# Patient Record
Sex: FEMALE | Race: BLACK OR AFRICAN AMERICAN | Employment: UNEMPLOYED | URBAN - METROPOLITAN AREA
[De-identification: names, ages, dates, MRNs, and addresses within clinical notes are randomized per-mention and may not be internally consistent; named-entity substitution may affect disease eponyms.]

---

## 2017-01-03 DIAGNOSIS — R31.9 HEMATURIA: Primary | ICD-10-CM

## 2017-01-04 ENCOUNTER — TELEPHONE (OUTPATIENT)
Dept: INTERNAL MEDICINE CLINIC | Age: 57
End: 2017-01-04

## 2017-01-04 NOTE — TELEPHONE ENCOUNTER
Attempted to contact pt at given and listed numbers, no answer. Lvm for pt to return call to office at [phone]. Will continue to try to contact pt.

## 2017-01-04 NOTE — LETTER
1/10/2017 1:50 PM 
 
Ms. Irving Smith [address] [facility] Dear Abdoulaye Bansal: 
 
I hope this letter finds you well. I am a Licensed Practical Nurse with On license of [facility] and I have attempted to contact you by phone, but was unsuccessful. Your good health is important to us. As always, our goal is to be your partner in life-long wellness. Please contact our office at your earliest convenience. If you have any questions, please do not hesitate to give us a call at the number listed above. Sincerely, Mario Ray LPN

## 2017-01-09 NOTE — TELEPHONE ENCOUNTER
Attempted to contact pt at  number, no answer. Not able to leave a VM because mailbox is full. Will continue to try to contact pt.

## 2017-01-10 NOTE — TELEPHONE ENCOUNTER
Attempted to contact pt at  number, no answer. m for pt to return call to office at [phone]. Will continue to try to contact pt. I have been unable to reach this patient by phone. A letter is being sent to the last known home address. Encounter will be closed.

## 2017-01-16 ENCOUNTER — HOSPITAL ENCOUNTER (OUTPATIENT)
Dept: LAB | Age: 57
Discharge: HOME | End: 2017-01-16
Payer: MEDICARE

## 2017-01-16 ENCOUNTER — OFFICE VISIT (OUTPATIENT)
Dept: INTERNAL MEDICINE CLINIC | Age: 57
End: 2017-01-16

## 2017-01-16 VITALS
HEIGHT: 67 IN | HEART RATE: 99 BPM | SYSTOLIC BLOOD PRESSURE: 109 MMHG | OXYGEN SATURATION: 96 % | DIASTOLIC BLOOD PRESSURE: 73 MMHG | BODY MASS INDEX: 38.92 KG/M2 | RESPIRATION RATE: 15 BRPM | WEIGHT: 248 LBS | TEMPERATURE: 98 F

## 2017-01-16 DIAGNOSIS — E11.9 TYPE 2 DIABETES MELLITUS WITHOUT COMPLICATION, WITHOUT LONG-TERM CURRENT USE OF INSULIN (HCC): Chronic | ICD-10-CM

## 2017-01-16 DIAGNOSIS — Z13.39 SCREENING FOR ALCOHOLISM: ICD-10-CM

## 2017-01-16 DIAGNOSIS — R19.5 LOOSE STOOLS: ICD-10-CM

## 2017-01-16 DIAGNOSIS — N39.0 URINARY TRACT INFECTION WITH HEMATURIA, SITE UNSPECIFIED: ICD-10-CM

## 2017-01-16 DIAGNOSIS — Z00.00 ROUTINE GENERAL MEDICAL EXAMINATION AT A HEALTH CARE FACILITY: Primary | ICD-10-CM

## 2017-01-16 DIAGNOSIS — R31.9 URINARY TRACT INFECTION WITH HEMATURIA, SITE UNSPECIFIED: ICD-10-CM

## 2017-01-16 LAB
ALBUMIN SERPL BCP-MCNC: 4.1 G/DL (ref 3.4–5)
ALBUMIN/GLOB SERPL: 1.2 {RATIO} (ref 0.8–1.7)
ALP SERPL-CCNC: 51 U/L (ref 45–117)
ALT SERPL-CCNC: 34 U/L (ref 13–56)
ANION GAP BLD CALC-SCNC: 6 MMOL/L (ref 3–18)
APPEARANCE UR: ABNORMAL
AST SERPL W P-5'-P-CCNC: 22 U/L (ref 15–37)
BACTERIA URNS QL MICRO: ABNORMAL /HPF
BILIRUB SERPL-MCNC: 0.3 MG/DL (ref 0.2–1)
BILIRUB UR QL: ABNORMAL
BUN SERPL-MCNC: 10 MG/DL (ref 7–18)
BUN/CREAT SERPL: 10 (ref 12–20)
CALCIUM SERPL-MCNC: 9.1 MG/DL (ref 8.5–10.1)
CAOX CRY URNS QL MICRO: ABNORMAL
CHLORIDE SERPL-SCNC: 107 MMOL/L (ref 100–108)
CHOLEST SERPL-MCNC: 124 MG/DL
CO2 SERPL-SCNC: 27 MMOL/L (ref 21–32)
COLOR UR: ABNORMAL
CREAT SERPL-MCNC: 1.04 MG/DL (ref 0.6–1.3)
EPITH CASTS URNS QL MICRO: ABNORMAL /LPF (ref 0–5)
GLOBULIN SER CALC-MCNC: 3.5 G/DL (ref 2–4)
GLUCOSE SERPL-MCNC: 79 MG/DL (ref 74–99)
GLUCOSE UR STRIP.AUTO-MCNC: NEGATIVE MG/DL
HBA1C MFR BLD: 6.8 % (ref 4.2–5.6)
HDLC SERPL-MCNC: 51 MG/DL (ref 40–60)
HDLC SERPL: 2.4 {RATIO} (ref 0–5)
HGB UR QL STRIP: NEGATIVE
KETONES UR QL STRIP.AUTO: ABNORMAL MG/DL
LDLC SERPL CALC-MCNC: 45.4 MG/DL (ref 0–100)
LEUKOCYTE ESTERASE UR QL STRIP.AUTO: ABNORMAL
LIPID PROFILE,FLP: NORMAL
NITRITE UR QL STRIP.AUTO: POSITIVE
PH UR STRIP: 5.5 [PH] (ref 5–8)
POTASSIUM SERPL-SCNC: 3.8 MMOL/L (ref 3.5–5.5)
PROT SERPL-MCNC: 7.6 G/DL (ref 6.4–8.2)
PROT UR STRIP-MCNC: NEGATIVE MG/DL
RBC #/AREA URNS HPF: 0 /HPF (ref 0–5)
SODIUM SERPL-SCNC: 140 MMOL/L (ref 136–145)
SP GR UR REFRACTOMETRY: 1.03 (ref 1–1.03)
TRIGL SERPL-MCNC: 138 MG/DL (ref ?–150)
UROBILINOGEN UR QL STRIP.AUTO: 1 EU/DL (ref 0.2–1)
VLDLC SERPL CALC-MCNC: 27.6 MG/DL
WBC URNS QL MICRO: ABNORMAL /HPF (ref 0–4)

## 2017-01-16 PROCEDURE — 80053 COMPREHEN METABOLIC PANEL: CPT | Performed by: INTERNAL MEDICINE

## 2017-01-16 PROCEDURE — 82570 ASSAY OF URINE CREATININE: CPT | Performed by: INTERNAL MEDICINE

## 2017-01-16 PROCEDURE — 36415 COLL VENOUS BLD VENIPUNCTURE: CPT | Performed by: INTERNAL MEDICINE

## 2017-01-16 PROCEDURE — 80061 LIPID PANEL: CPT | Performed by: INTERNAL MEDICINE

## 2017-01-16 PROCEDURE — 81001 URINALYSIS AUTO W/SCOPE: CPT | Performed by: INTERNAL MEDICINE

## 2017-01-16 PROCEDURE — 83036 HEMOGLOBIN GLYCOSYLATED A1C: CPT | Performed by: INTERNAL MEDICINE

## 2017-01-16 RX ORDER — CALCIUM CARBONATE 600 MG
TABLET ORAL
COMMUNITY
End: 2017-07-13

## 2017-01-16 RX ORDER — ASPIRIN 81 MG/1
81 TABLET ORAL
COMMUNITY
End: 2017-01-16 | Stop reason: SDUPTHER

## 2017-01-16 RX ORDER — CARVEDILOL 6.25 MG/1
TABLET ORAL
Refills: 0 | COMMUNITY
Start: 2016-11-05 | End: 2017-01-16 | Stop reason: SDUPTHER

## 2017-01-16 RX ORDER — UBIDECARENONE 30 MG
100 CAPSULE ORAL
COMMUNITY
End: 2017-01-16 | Stop reason: SDUPTHER

## 2017-01-16 RX ORDER — SULFAMETHOXAZOLE AND TRIMETHOPRIM 800; 160 MG/1; MG/1
1 TABLET ORAL 2 TIMES DAILY
Qty: 14 TAB | Refills: 0 | Status: SHIPPED | OUTPATIENT
Start: 2017-01-16 | End: 2017-02-27

## 2017-01-16 NOTE — PATIENT INSTRUCTIONS
Schedule of Personalized Health Plan  (Provide Copy to Patient)  The best way to stay healthy is to live a healthy lifestyle. A healthy lifestyle includes regular exercise, eating a well-balanced diet, keeping a healthy weight and not smoking. Regular physical exams and screening tests are another important way to take care of yourself. Preventive exams provided by health care providers can find health problems early when treatment works best and can keep you from getting certain diseases or illnesses. Preventive services include exams, lab tests, screenings, shots, monitoring and information to help you take care of your own health. All people over 65 should have a pneumonia shot. Pneumonia shots are usually only needed once in a lifetime unless your doctor decides differently. All people over 65 should have a yearly flu shot. People over 65 are at medium to high risk for Hepatitis B. Three shots are needed for complete protection. In addition to your physical exam, some screening tests are recommended:    Bone mass measurement (dexa scan) is recommended every two years  Diabetes Mellitus screening is recommended every year. Glaucoma is an eye disease caused by high pressure in the eye. An eye exam is recommended every year. Cardiovascular screening tests that check your cholesterol and other blood fat (lipid) levels are recommended every five years. Colorectal Cancer screening tests help to find pre-cancerous polyps (growths in the colon) so they can be removed before they turn into cancer. Tests ordered for screening depend on your personal and family history risk factors.     Screening for Breast Cancer is recommended yearly with a mammogram.    Screening for Cervical Cancer is recommended every two years (annually for certain risk factors, such as previous history of STD or abnormal PAP in past 7 years), with a Pelvic Exam with PAP    Here is a list of your current Health Maintenance items with a due date:  Health Maintenance   Topic Date Due    Pneumococcal 19-64 Medium Risk (1 of 1 - PPSV23) 06/19/1980    FOOT EXAM Q1  12/22/2016    MICROALBUMIN Q1  12/22/2016    EYE EXAM RETINAL OR DILATED Q1  03/30/2017    HEMOGLOBIN A1C Q6M  06/02/2017    PAP AKA CERVICAL CYTOLOGY  09/25/2017    LIPID PANEL Q1  11/29/2017    BREAST CANCER SCRN MAMMOGRAM  12/07/2018    DTaP/Tdap/Td series (2 - Td) 11/01/2022    COLONOSCOPY  04/01/2024    Hepatitis C Screening  Completed    INFLUENZA AGE 9 TO ADULT  Completed

## 2017-01-16 NOTE — PROGRESS NOTES
HISTORY OF PRESENT ILLNESS  Judith Baldwin is a 55 y.o. female. Visit Vitals    /73    Pulse 99    Temp 98 °F (36.7 °C) (Oral)    Resp 15    Ht 5' 7\" (1.702 m)    Wt 248 lb (112.5 kg)    SpO2 96%    BMI 38.84 kg/m2       Diabetes   The history is provided by the patient. This is a chronic problem. The current episode started more than 1 week ago. The problem occurs daily. The problem has not changed since onset. Exacerbated by: diet. The symptoms are relieved by medications (diet). Treatments tried: see med list. The treatment provided moderate relief. Diarrhea    The current episode started more than 1 week ago. The problem occurs 2 to 4 times per day. The problem has not changed since onset. There has been no fever. The stool consistency is described as watery. Pertinent negatives include no vomiting (nausea), no chills and no anal bleeding. Risk factors: stress. Treatments tried: is taking amitiza? Review of Systems   Constitutional: Negative. Negative for chills. HENT: Negative. Respiratory: Negative. Cardiovascular: Negative. Gastrointestinal: Positive for diarrhea. Negative for anal bleeding and vomiting (nausea). Neurological: Negative. Physical Exam   Constitutional: She is oriented to person, place, and time. She appears well-developed and well-nourished. No distress. Cardiovascular: Normal rate and regular rhythm. Pulmonary/Chest: Effort normal and breath sounds normal.   Musculoskeletal: She exhibits no edema. Neurological: She is alert and oriented to person, place, and time.    Diabetic foot exam:     Left: Reflexes 2+     Vibratory sensation     Proprioception normal   Sharp/dull discrimination     Filament test normal sensation with micro filament   Pulse DP: 2+ (normal)   Pulse PT:    Deformities: Mild - 2nd and 3rd hammertoes    Right: Reflexes 2+   Vibratory sensation    Proprioception normal   Sharp/dull discrimination    Filament test normal sensation with micro filament   Pulse DP: 2+ (normal)   Pulse PT:    Deformities: Mild - 2nd and 3rd toe hammertoes       Skin: Skin is warm and dry. She is not diaphoretic. Psychiatric: She has a normal mood and affect. Nursing note and vitals reviewed. ASSESSMENT and PLAN    ICD-10-CM ICD-9-CM                   Type 2 diabetes mellitus without complication, without long-term current use of insulin ([facility]) O74.2 843.32 METABOLIC PANEL, COMPREHENSIVE      URINALYSIS W/ RFLX MICROSCOPIC      MICROALBUMIN, UR, RAND W/ MICROALBUMIN/CREA RATIO      LIPID PANEL      HM DIABETES FOOT EXAM      HEMOGLOBIN A1C W/O EAG    Loose stools L20.6 358.7 METABOLIC PANEL, COMPREHENSIVE    Urinary tract infection with hematuria, site unspecified N39.0 599.0 URINALYSIS W/ RFLX MICROSCOPIC    R31.9         Will tx the bladder UTI with sulfa. Hopefully the stools and the low back ache will improve. Can use some OTC imodium for now    Update diabetic lab    BP controlled    Discussed weight,  Diet, and lifestyle.     F/u 6 months for diabetes

## 2017-01-16 NOTE — PROGRESS NOTES
Jordi Rosas presents today for   Chief Complaint   Patient presents with    Annual Wellness Visit    Hypertension    Diabetes       Jordi Rosas preferred language for health care discussion is english/other. Is someone accompanying this pt? no    Is the patient using any DME equipment during OV? no    Depression Screening:  PHQ 2 / 9, over the last two weeks 12/12/2016 10/13/2016 5/9/2016 6/22/2015 7/3/2014   Little interest or pleasure in doing things Several days Not at all Several days Nearly every day Nearly every day   Feeling down, depressed or hopeless Several days Not at all Several days Nearly every day Several days   Total Score PHQ 2 2 0 2 6 4       Learning Assessment:  Learning Assessment 11/24/2014   PRIMARY LEARNER Patient   HIGHEST LEVEL OF EDUCATION - PRIMARY LEARNER  2 YEARS OF COLLEGE   BARRIERS PRIMARY LEARNER NONE   CO-LEARNER CAREGIVER No   PRIMARY LANGUAGE ENGLISH   LEARNER PREFERENCE PRIMARY DEMONSTRATION   ANSWERED BY self   RELATIONSHIP SELF       Abuse Screening:  No flowsheet data found. Fall Risk  No flowsheet data found. Health Maintenance reviewed and discussed per provider. Yes    Jordi Rosas is due for foot exam, microalbumin. Please order/place referral if appropriate. Advance Directive:  1. Do you have an advance directive in place? Patient Reply: no    2. If not, would you like material regarding how to put one in place? Patient Reply: yes    Coordination of Care:  1. Have you been to the ER, urgent care clinic since your last visit? Hospitalized since your last visit? no    2. Have you seen or consulted any other health care providers outside of the [address] [place] [address] since your last visit? Include any pap smears or colon screening.  no

## 2017-01-16 NOTE — PROGRESS NOTES
This is a Subsequent Medicare Annual Wellness Visit providing Personalized Prevention Plan Services (PPPS) (Performed 12 months after initial AWV and PPPS )    I have reviewed the patient's medical history in detail and updated the computerized patient record. History     Past Medical History   Diagnosis Date    Abnormal bone density screening 3/10/2011    Anemia 1996    Baker's cyst      left    CTS (carpal tunnel syndrome) 1999     bilat    Depression 2000    Diabetes mellitus     Fibromyalgia 2000    GERD (gastroesophageal reflux disease)     History of meniscal tear     Metacarpal bone fracture 2003     left 5th    Migraines     OAB (overactive bladder)      documented on UDS 2009    Obesity     Osteopenia march 2006    Plantar fasciitis 1996    Pre-syncope 7/19/16    Sleep apnea     Type II or unspecified type diabetes mellitus with neurological manifestations, uncontrolled     Unspecified hereditary and idiopathic peripheral neuropathy     Urge incontinence     Vitamin D deficiency       Past Surgical History   Procedure Laterality Date    Cardiac catheterization  04/2006     nml coronary arteries    Hx cholecystectomy  1999    Egd  8/08    Colonoscopy  8/08     5 yr f/u, Dr. Mala Perales Hx colonoscopy  4/14    Hx arthrotomy  1/15     left shoulder, with decompression. Dr. Alfredito Amaya Pr wrist arthroscop,release xvers lig  12/23/15     Carpal Tunnel Release    Pr wrist arthroscop,release xvers lig  1/12/16     Carpal Tunnel release     Current Outpatient Prescriptions   Medication Sig Dispense Refill    lubiPROStone (AMITIZA) 8 mcg capsule Take 2 Caps by mouth two (2) times daily (with meals). Indications: Chronic Idiopathic Constipation 120 Cap 5    HYDROcodone-acetaminophen (NORCO) 7.5-325 mg per tablet Take 1 Tab by mouth every six (6) hours as needed for Pain. Max Daily Amount: 4 Tabs. 30 Tab 0    DULoxetine (CYMBALTA) 60 mg capsule 120 mg daily.   0    hydrOXYzine (ATARAX) 50 mg tablet take 1 tablet by mouth every 12 hours  0    NYAMYC powder APPLY THIN LAYER UNDER BREASTS TWICE A DAY AS NEED FOR FLARE  1    enalapril (VASOTEC) 20 mg tablet Take 1 Tab by mouth daily. 30 Tab 5    alendronate (FOSAMAX) 70 mg tablet Take 1 Tab by mouth every seven (7) days. 4 Tab 11    tiZANidine (ZANAFLEX) 4 mg tablet Take 1 Tab by mouth three (3) times daily as needed. Indications: MUSCLE SPASM 30 Tab 1    ondansetron (ZOFRAN ODT) 8 mg disintegrating tablet Take 1 Tab by mouth every eight (8) hours as needed for Nausea. 15 Tab 1    SUMAtriptan succinate (IMITREX STATDOSE PEN) 6 mg/0.5 mL kit 6 mg by SubCUTAneous route once as needed for Migraine. 1 Kit 11    omeprazole (PRILOSEC) 20 mg capsule two (2) times a day.  0    ONETOUCH ULTRA TEST strip       multivitamin (ONE A DAY) tablet Take 1 Tab by mouth daily.  BD INSULIN PEN NEEDLE UF MINI 31 x 3/16 \" ndle   0    spironolactone (ALDACTONE) 25 mg tablet Take  by mouth daily.  CYANOCOBALAMIN, VITAMIN B-12, (B-12 DOTS PO) Take  by mouth daily.  coenzyme q10-vitamin e (COQ10 ) 100-100 mg-unit cap Take  by mouth two (2) times a day.  Liraglutide (VICTOZA) 0.6 mg/0.1 mL (18 mg/3 mL) sub-q pen 0.6 mg by SubCUTAneous route.  carvedilol (COREG) 3.125 mg tablet Take 6.25 mg by mouth two (2) times a day.  clonazepam (KLONOPIN) 0.5 mg tablet Take  by mouth two (2) times a day.  rosuvastatin (CRESTOR) 40 mg tablet Take 40 mg by mouth daily. NON FORMULARY       fenofibrate micronized (LOFIBRA) 134 mg capsule Take  by mouth every morning.  polyethylene glycol (MIRALAX) 17 gram/dose powder Take 17 g by mouth daily.  aspirin 81 mg chewable tablet Take 81 mg by mouth daily.  cholecalciferol, vitamin d3, (VITAMIN D) 1,000 unit tablet Take  by mouth daily.  CALCIUM CARBONATE/VITAMIN D3 (CALCIUM 600 + D,3, PO) Take 2 Caps by mouth daily.       calcium carbonate (CALTREX) 600 mg (1,500 mg) tablet Take by Mouth. Allergies   Allergen Reactions    Digoxin Nausea Only    Niaspan [Niacin] Rash    Wellbutrin [Bupropion Hcl] Itching     Family History   Problem Relation Age of Onset    Hypertension Mother     Heart Disease Mother     Kidney Disease Mother     Heart Disease Father      Social History   Substance Use Topics    Smoking status: Never Smoker    Smokeless tobacco: Never Used    Alcohol use No     Patient Active Problem List   Diagnosis Code    Migraine aura without headache G43. 109    Fatigue R53.83    Urge incontinence N39.41    OAB (overactive bladder) N32.81    DM2 (diabetes mellitus, type 2) (HCC) E11.9    Disorder of urinary tract N39.9    Constipation K59.00    Urinary incontinence R32    Dysfunctional voiding of urine N39.8    Erythema of tympanic membrane of both ears H73.23    Sinus pressure J34.89    Cough R05    Hematuria R31.9       Depression Risk Factor Screening:     PHQ 2 / 9, over the last two weeks 12/12/2016   Little interest or pleasure in doing things Several days   Feeling down, depressed or hopeless Several days   Total Score PHQ 2 2     Alcohol Risk Factor Screening: On any occasion during the past 3 months, have you had more than 3 drinks containing alcohol? No    Do you average more than 7 drinks per week? No      Functional Ability and Level of Safety:     Hearing Loss   none    Activities of Daily Living   Self-care. Requires assistance with: no ADLs    Fall Risk   No flowsheet data found. Abuse Screen   Patient is not abused    Review of Systems   A comprehensive review of systems was negative except for that written in the HPI. Some nausea and queasiness recently with some diarrhea. She has intermittent episodes of this and is not sure why it flared up this time.     Physical Examination     Evaluation of Cognitive Function:  Mood/affect:  neutral  Appearance: age appropriate  Family member/caregiver input: self    Visit Vitals  /73    Pulse 99    Temp 98 °F (36.7 °C) (Oral)    Resp 15    Ht 5' 7\" (1.702 m)    Wt 248 lb (112.5 kg)    SpO2 96%    BMI 38.84 kg/m2     General appearance: alert, cooperative, no distress, appears stated age  Ears: normal TM's and external ear canals AU  Throat: Lips, mucosa, and tongue normal. Teeth and gums normal  Lungs: clear to auscultation bilaterally  Breasts: normal appearance, no masses or tenderness  Abdomen: soft, non-tender. Bowel sounds normal. No masses,  no organomegaly  Extremities: extremities normal, atraumatic, no cyanosis or edema  3/3 objects remembered easily    Patient Care Team:  Nafisa Rose MD as PCP - General (Internal Medicine)  Cheyenne Gomes MD as Consulting Provider (Endocrinology)  Faraz Kulkarni MD as Consulting Provider (Obstetrics & Gynecology)  Leigha Ware MD as Consulting Provider (Orthopedic Surgery)  Theresa Oliver MD as Consulting Provider (Urology)  Tania Cervantes MD as Physician (Optometry)  Terrell Victor MD as Consulting Provider (Orthopedic Surgery)  Terry Pisano MD as Physician (Gastroenterology)    Advice/Referrals/Counseling   Education and counseling provided:  Are appropriate based on today's review and evaluation    Assessment/Plan       ICD-10-CM ICD-9-CM    1. Routine general medical examination at a health care facility Z00.00 V70.0    2. Screening for alcoholism Z13.89 V79.1    .

## 2017-01-16 NOTE — MR AVS SNAPSHOT
Visit Information Date & Time Provider Department Dept. Phone Encounter #  
 1/16/2017  1:45 PM Fabio Herring [facility] [phone] [id number] Follow-up Instructions Return in about 6 months (around 7/16/2017) for HTN, DM, cholesterol. Upcoming Health Maintenance Date Due Pneumococcal 19-64 Medium Risk (1 of 1 - PPSV23) 6/19/1980 FOOT EXAM Q1 12/22/2016 MICROALBUMIN Q1 12/22/2016 EYE EXAM RETINAL OR DILATED Q1 3/30/2017 HEMOGLOBIN A1C Q6M 6/2/2017 PAP AKA CERVICAL CYTOLOGY 9/25/2017 LIPID PANEL Q1 11/29/2017 BREAST CANCER SCRN MAMMOGRAM 12/7/2018 DTaP/Tdap/Td series (2 - Td) 11/1/2022 COLONOSCOPY 4/1/2024 Allergies as of 1/16/2017  Review Complete On: 1/16/2017 By: Fabio Herring MD  
  
 Severity Noted Reaction Type Reactions Digoxin  11/24/2014   Side Effect Nausea Only Niaspan [Niacin]  10/31/2011   Side Effect Rash Wellbutrin [Bupropion Hcl]  11/24/2014   Side Effect Itching Current Immunizations  Reviewed on 9/27/2013 Name Date Influenza Vaccine 9/27/2013  3:46 PM  
 Influenza Vaccine (Quad) PF 10/13/2016, 12/17/2015  2:37 PM  
 Influenza Vaccine PF 10/20/2014 Influenza Vaccine Split 11/1/2012  9:46 AM  
 Pneumococcal Conjugate (PCV-13) 12/12/2016 TDAP Vaccine 11/1/2012  9:45 AM  
  
 Not reviewed this visit You Were Diagnosed With   
  
 Codes Comments Routine general medical examination at a health care facility    -  Primary ICD-10-CM: Z00.00 ICD-9-CM: V70.0 Screening for alcoholism     ICD-10-CM: Z13.89 ICD-9-CM: V79.1 Type 2 diabetes mellitus without complication, without long-term current use of insulin (HCC)     ICD-10-CM: E11.9 ICD-9-CM: 250.00 Loose stools     ICD-10-CM: R19.5 ICD-9-CM: 787.7 Urinary tract infection with hematuria, site unspecified     ICD-10-CM: N39.0, R31.9 ICD-9-CM: 599.0 Vitals BP Pulse Temp Resp Height(growth percentile) Weight(growth percentile) 109/73 99 98 °F (36.7 °C) (Oral) 15 5' 7\" (1.702 m) 248 lb (112.5 kg) SpO2 BMI OB Status Smoking Status 96% 38.84 kg/m2 Postmenopausal Never Smoker BMI and BSA Data Body Mass Index Body Surface Area  
 38.84 kg/m 2 2.31 m 2 Preferred Pharmacy Pharmacy Name Phone [facility] [address] [address]  [facility] [phone] Your Updated Medication List  
  
   
This list is accurate as of: 1/16/17  3:06 PM.  Always use your most recent med list.  
  
  
  
  
 alendronate 70 mg tablet Commonly known as:  FOSAMAX Take 1 Tab by mouth every seven (7) days. aspirin 81 mg chewable tablet Take 81 mg by mouth daily. B-12 DOTS PO Take  by mouth daily. BD INSULIN PEN NEEDLE UF MINI 31 gauge x 3/16\" Ndle Generic drug:  Insulin Needles (Disposable) CALCIUM 600 + D(3) PO Take 2 Caps by mouth daily. calcium carbonate 600 mg (1,500 mg) tablet Commonly known as:  Leroy Olden Take by Mouth.  
  
 clonazePAM 0.5 mg tablet Commonly known as:  Inez Kava Take  by mouth two (2) times a day. COQ10  100-100 mg-unit Cap Generic drug:  coenzyme q10-vitamin e Take  by mouth two (2) times a day. COREG 3.125 mg tablet Generic drug:  carvedilol Take 6.25 mg by mouth two (2) times a day. CRESTOR 40 mg tablet Generic drug:  rosuvastatin Take 40 mg by mouth daily. NON FORMULARY DULoxetine 60 mg capsule Commonly known as:  CYMBALTA  
120 mg daily. enalapril 20 mg tablet Commonly known as:  Sacha Carmina Take 1 Tab by mouth daily. HYDROcodone-acetaminophen 7.5-325 mg per tablet Commonly known as:  Vero Scar Take 1 Tab by mouth every six (6) hours as needed for Pain. Max Daily Amount: 4 Tabs. hydrOXYzine HCl 50 mg tablet Commonly known as:  ATARAX  
take 1 tablet by mouth every 12 hours IMITREX STATDOSE PEN 6 mg/0.5 mL kit Generic drug:  SUMAtriptan succinate 6 mg by SubCUTAneous route once as needed for Migraine. LOFIBRA 134 mg capsule Generic drug:  fenofibrate micronized Take  by mouth every morning. lubiPROStone 8 mcg capsule Commonly known as:  Buck Melter Take 2 Caps by mouth two (2) times daily (with meals). Indications: Chronic Idiopathic Constipation MIRALAX 17 gram/dose powder Generic drug:  polyethylene glycol Take 17 g by mouth daily. multivitamin tablet Commonly known as:  ONE A DAY Take 1 Tab by mouth daily. NYAMYC powder Generic drug:  nystatin APPLY THIN LAYER UNDER BREASTS TWICE A DAY AS NEED FOR FLARE  
  
 omeprazole 20 mg capsule Commonly known as:  PRILOSEC  
two (2) times a day. ondansetron 8 mg disintegrating tablet Commonly known as:  ZOFRAN ODT Take 1 Tab by mouth every eight (8) hours as needed for Nausea. ONETOUCH ULTRA TEST strip Generic drug:  glucose blood VI test strips  
  
 spironolactone 25 mg tablet Commonly known as:  ALDACTONE Take  by mouth daily. tiZANidine 4 mg tablet Commonly known as:  Pat Garcia Take 1 Tab by mouth three (3) times daily as needed. Indications: MUSCLE SPASM  
  
 trimethoprim-sulfamethoxazole 160-800 mg per tablet Commonly known as:  BACTRIM DS, SEPTRA DS Take 1 Tab by mouth two (2) times a day. VICTOZA 0.6 mg/0.1 mL (18 mg/3 mL) sub-q pen Generic drug:  Liraglutide 0.6 mg by SubCUTAneous route. VITAMIN D3 1,000 unit tablet Generic drug:  cholecalciferol Take  by mouth daily. Prescriptions Sent to Pharmacy Refills  
 trimethoprim-sulfamethoxazole (BACTRIM DS, SEPTRA DS) 160-800 mg per tablet 0 Sig: Take 1 Tab by mouth two (2) times a day. Class: Normal  
 Pharmacy: [address]  [facility] Ph #: [phone] Route: Oral  
  
We Performed the Following  DIABETES FOOT EXAM [7 Custom] Follow-up Instructions Return in about 6 months (around 7/16/2017) for HTN, DM, cholesterol. To-Do List   
 01/16/2017 Lab:  HEMOGLOBIN A1C W/O EAG   
  
 01/16/2017 Lab:  LIPID PANEL   
  
 01/16/2017 Lab:  METABOLIC PANEL, COMPREHENSIVE   
  
 01/16/2017 Lab:  MICROALBUMIN, UR, RAND W/ MICROALBUMIN/CREA RATIO   
  
 01/16/2017 Lab:  URINALYSIS W/ RFLX MICROSCOPIC Patient Instructions Schedule of Personalized Health Plan (Provide Copy to Patient) The best way to stay healthy is to live a healthy lifestyle. A healthy lifestyle includes regular exercise, eating a well-balanced diet, keeping a healthy weight and not smoking. Regular physical exams and screening tests are another important way to take care of yourself. Preventive exams provided by health care providers can find health problems early when treatment works best and can keep you from getting certain diseases or illnesses. Preventive services include exams, lab tests, screenings, shots, monitoring and information to help you take care of your own health. All people over 65 should have a pneumonia shot. Pneumonia shots are usually only needed once in a lifetime unless your doctor decides differently. All people over 65 should have a yearly flu shot. People over 65 are at medium to high risk for Hepatitis B. Three shots are needed for complete protection. In addition to your physical exam, some screening tests are recommended: 
 
Bone mass measurement (dexa scan) is recommended every two years Diabetes Mellitus screening is recommended every year. Glaucoma is an eye disease caused by high pressure in the eye. An eye exam is recommended every year. Cardiovascular screening tests that check your cholesterol and other blood fat (lipid) levels are recommended every five years.   
 
Colorectal Cancer screening tests help to find pre-cancerous polyps (growths in the colon) so they can be removed before they turn into cancer. Tests ordered for screening depend on your personal and family history risk factors. Screening for Breast Cancer is recommended yearly with a mammogram. 
 
Screening for Cervical Cancer is recommended every two years (annually for certain risk factors, such as previous history of STD or abnormal PAP in past 7 years), with a Pelvic Exam with PAP Here is a list of your current Health Maintenance items with a due date: 
Health Maintenance Topic Date Due  Pneumococcal 19-64 Medium Risk (1 of 1 - PPSV23) 06/19/1980  
 FOOT EXAM Q1  12/22/2016  MICROALBUMIN Q1  12/22/2016  
 EYE EXAM RETINAL OR DILATED Q1  03/30/2017  
 HEMOGLOBIN A1C Q6M  06/02/2017  PAP AKA CERVICAL CYTOLOGY  09/25/2017  LIPID PANEL Q1  11/29/2017  BREAST CANCER SCRN MAMMOGRAM  12/07/2018  DTaP/Tdap/Td series (2 - Td) 11/01/2022  COLONOSCOPY  04/01/2024  Hepatitis C Screening  Completed  INFLUENZA AGE 9 TO ADULT  Completed Introducing hospitals & HEALTH SERVICES! Dear Wily Pike: Thank you for requesting a [employer] account. Our records indicate that you already have an active [employer] account. You can access your account anytime at [email or web address]. [email or web address]/[email or web address] Reports Did you know that you can access your hospital and ER discharge instructions at any time in [employer]? You can also review all of your test results from your hospital stay or ER visit. Additional Information If you have questions, please visit the Frequently Asked Questions section of the [employer] website at [email or web address] [email or web address]/[email or web address]/. Remember, [employer] is NOT to be used for urgent needs. For medical emergencies, dial 911. Now available from your iPhone and Android! Please provide this summary of care documentation to your next provider. Your primary care clinician is listed as [facility].  If you have any questions after today's visit, please call [phone].

## 2017-01-17 LAB
CREAT UR-MCNC: 415.29 MG/DL (ref 30–125)
MICROALBUMIN UR-MCNC: 1.9 MG/DL (ref 0–3)
MICROALBUMIN/CREAT UR-RTO: 5 MG/G (ref 0–30)

## 2017-01-31 ENCOUNTER — TELEPHONE (OUTPATIENT)
Dept: INTERNAL MEDICINE CLINIC | Age: 57
End: 2017-01-31

## 2017-02-01 NOTE — TELEPHONE ENCOUNTER
Pt contacted at home number. 2 pt identifiers confirmed. Pt was calling regarding letter that was sent for unsuccessful attempts to contact patient. Pt was seen in office on 01/16/2017 by PCP. Pt reports she is upset that she was not treated for UTI follow OV on 12/29/2016. Pt states that when she was seen by PCP on 01/16/2017 she was informed she should have been on antibiotics for UTI. Pt was informed of result note from PCP on 01/01/217 and pt was also informed that Kuldip Herman NP is not always in office and pt should follow up with PCP regarding lab work if NP is not available. Pt verbalized understanding. No other questions or concerns at this time.

## 2017-02-22 RX ORDER — CEFUROXIME AXETIL 250 MG/1
6 TABLET ORAL
Qty: 1 KIT | Refills: 11 | Status: SHIPPED | OUTPATIENT
Start: 2017-02-22 | End: 2019-01-20 | Stop reason: SDUPTHER

## 2017-02-22 NOTE — TELEPHONE ENCOUNTER
Requested Prescriptions     Pending Prescriptions Disp Refills    SUMAtriptan succinate (IMITREX STATDOSE PEN) 6 mg/0.5 mL kit 1 Kit 11     Si mg by SubCUTAneous route once as needed for Migraine.

## 2017-02-27 PROBLEM — N39.0 UTI (URINARY TRACT INFECTION): Status: ACTIVE | Noted: 2017-02-27

## 2017-03-16 ENCOUNTER — OFFICE VISIT (OUTPATIENT)
Dept: INTERNAL MEDICINE CLINIC | Age: 57
End: 2017-03-16

## 2017-03-16 VITALS
WEIGHT: 261 LBS | SYSTOLIC BLOOD PRESSURE: 130 MMHG | BODY MASS INDEX: 40.97 KG/M2 | TEMPERATURE: 97.6 F | HEART RATE: 97 BPM | HEIGHT: 67 IN | OXYGEN SATURATION: 100 % | DIASTOLIC BLOOD PRESSURE: 60 MMHG | RESPIRATION RATE: 16 BRPM

## 2017-03-16 DIAGNOSIS — M79.7 FIBROMYALGIA: Primary | ICD-10-CM

## 2017-03-16 DIAGNOSIS — E11.9 TYPE 2 DIABETES MELLITUS WITHOUT COMPLICATION, WITHOUT LONG-TERM CURRENT USE OF INSULIN (HCC): Chronic | ICD-10-CM

## 2017-03-16 DIAGNOSIS — K59.04 CHRONIC IDIOPATHIC CONSTIPATION: ICD-10-CM

## 2017-03-16 RX ORDER — GABAPENTIN 300 MG/1
300 CAPSULE ORAL 2 TIMES DAILY
Qty: 60 CAP | Refills: 1 | Status: SHIPPED | OUTPATIENT
Start: 2017-03-16 | End: 2017-07-13

## 2017-03-16 RX ORDER — DULAGLUTIDE 0.75 MG/.5ML
INJECTION, SOLUTION SUBCUTANEOUS
Refills: 1 | COMMUNITY
Start: 2017-03-06 | End: 2017-07-13

## 2017-03-16 NOTE — PROGRESS NOTES
HISTORY OF PRESENT ILLNESS  Michelle Valerio is a 55 y.o. female. Visit Vitals    /60    Pulse 97    Temp 97.6 °F (36.4 °C) (Oral)    Resp 16    Ht 5' 7\" (1.702 m)    Wt 261 lb (118.4 kg)    SpO2 100%    BMI 40.88 kg/m2       HPI Comments: Pt states \"I am just tired of hurting all of the time. \"  She can awaken in the middle of the night with pounding and fullness in her chest. Can last 10-15 minutes. She just \"tries to fall back to sleep. \" States she tries to deal with her pain by \"sleeping. \"  She has had a cardiac evaluation that was negative. She says her psychiatrist knows about this but told her she is on the best medicine    \"my whole body just aches. \" Moslty her joints. She gets very stiff while sitting and has a hard time getting up. \"I just hurt. \"    Generalized Body Aches   The history is provided by the patient (see comments). This is a chronic problem. Review of Systems   Constitutional: Negative. Respiratory: Negative. Cardiovascular: Negative. Musculoskeletal: Positive for back pain, joint pain and myalgias. Physical Exam   Constitutional: She is oriented to person, place, and time. She appears well-developed and well-nourished. No distress. Cardiovascular: Normal rate and regular rhythm. Pulmonary/Chest: Effort normal and breath sounds normal.   Musculoskeletal: She exhibits no edema. Nothing much on her joints. But c/o diffuse pain and has many trigger points. Neurological: She is alert and oriented to person, place, and time. Skin: Skin is warm and dry. She is not diaphoretic. Psychiatric: She has a normal mood and affect. Nursing note and vitals reviewed. ASSESSMENT and PLAN    ICD-10-CM ICD-9-CM    1. Fibromyalgia M79.7 729.1    2. Type 2 diabetes mellitus without complication, without long-term current use of insulin (HCC) E11.9 250.00    3. Chronic idiopathic constipation K59.04 564.00        Discussed trial of gabapentin.  Will try starting this  She is already on cymbalta    She reports the Amitiza does still help with her bowels. She still varies her other meds but her BMs are better. Discussed weight, and lifestyle.  She wants to work out better but hurts too much    F/u one month

## 2017-03-16 NOTE — PROGRESS NOTES
Chief Complaint   Patient presents with    Generalized Body Aches       Pt preferred language for health care discussion is english. Is someone accompanying this pt? no    Is the patient using any DME equipment during OV? no    Depression Screening completed. Active Dx    Learning Assessment completed. Yes    Abuse Screening completed. Yes    Health Maintenance reviewed and discussed per provider. Yes    Pt is due for Pneumo-13 or Peumo-23. Please order/place referral if appropriate. Advance Directive:  1. Do you have an advance directive in place? Patient Reply:no    2. If not, would you like material regarding how to put one in place? Patient Reply: No    Coordination of Care:  1. Have you been to the ER, urgent care clinic since your last visit? Hospitalized since your last visit? no    2. Have you seen or consulted any other health care providers outside of the [address] [place] [address] since your last visit? Include any pap smears or colon screening.  no

## 2017-03-16 NOTE — MR AVS SNAPSHOT
Visit Information Date & Time Provider Department Dept. Phone Encounter #  
 3/16/2017  9:00 AM Chris Genao [facility] [phone] [id number] Follow-up Instructions Return in about 1 month (around 4/16/2017) for check on med changes, fibromyalgia. Your Appointments 3/22/2017  1:00 PM  
ESTABLISHED PATIENT with Lolly Colvin MD  
[facility] ([address]) Appt Note: RTC in 2-3 weeks for f/u after botox [address] [phone]  
  
    
 7/18/2017 10:45 AM  
Office Visit with Chris Genao MD  
[facility] ([address]) Appt Note: 6 month fu  
 [address]  
  
   
 [address] [facility] Upcoming Health Maintenance Date Due Pneumococcal 19-64 Medium Risk (1 of 1 - PPSV23) 6/19/1980 HEMOGLOBIN A1C Q6M 7/16/2017 PAP AKA CERVICAL CYTOLOGY 9/25/2017 FOOT EXAM Q1 1/16/2018 MICROALBUMIN Q1 1/16/2018 LIPID PANEL Q1 1/16/2018 EYE EXAM RETINAL OR DILATED Q1 1/31/2018 BREAST CANCER SCRN MAMMOGRAM 12/7/2018 DTaP/Tdap/Td series (2 - Td) 11/1/2022 COLONOSCOPY 4/1/2024 Allergies as of 3/16/2017  Review Complete On: 3/16/2017 By: Chris Genao MD  
  
 Severity Noted Reaction Type Reactions Digoxin  11/24/2014   Side Effect Nausea Only Niaspan [Niacin]  10/31/2011   Side Effect Rash Wellbutrin [Bupropion Hcl]  11/24/2014   Side Effect Itching Current Immunizations  Reviewed on 9/27/2013 Name Date Influenza Vaccine 9/27/2013  3:46 PM  
 Influenza Vaccine (Quad) PF 10/13/2016, 12/17/2015  2:37 PM  
 Influenza Vaccine PF 10/20/2014 Influenza Vaccine Split 11/1/2012  9:46 AM  
 Pneumococcal Conjugate (PCV-13) 12/12/2016 TDAP Vaccine 11/1/2012  9:45 AM  
  
 Not reviewed this visit You Were Diagnosed With   
  
 Codes Comments Fibromyalgia    -  Primary ICD-10-CM: M79.7 ICD-9-CM: 729.1 Type 2 diabetes mellitus without complication, without long-term current use of insulin (HCC)     ICD-10-CM: E11.9 ICD-9-CM: 250.00 Chronic idiopathic constipation     ICD-10-CM: K59.04 
ICD-9-CM: 564.00 Vitals BP Pulse Temp Resp Height(growth percentile) Weight(growth percentile) 130/60 97 97.6 °F (36.4 °C) (Oral) 16 5' 7\" (1.702 m) 261 lb (118.4 kg) SpO2 BMI OB Status Smoking Status 100% 40.88 kg/m2 Postmenopausal Never Smoker BMI and BSA Data Body Mass Index Body Surface Area  
 40.88 kg/m 2 2.37 m 2 Preferred Pharmacy Pharmacy Name Phone [address]  [facility] [phone] Your Updated Medication List  
  
   
This list is accurate as of: 3/16/17  9:37 AM.  Always use your most recent med list.  
  
  
  
  
 alendronate 70 mg tablet Commonly known as:  FOSAMAX Take 1 Tab by mouth every seven (7) days. aspirin 81 mg chewable tablet Take 81 mg by mouth daily. BD INSULIN PEN NEEDLE UF MINI 31 gauge x 3/16\" Ndle Generic drug:  Insulin Needles (Disposable) CALCIUM 600 + D(3) PO Take 2 Caps by mouth daily. calcium carbonate 600 mg calcium (1,500 mg) tablet Commonly known as:  Megan Narinder Take by Mouth.  
  
 clonazePAM 0.5 mg tablet Commonly known as:  Cheko Brocks Take  by mouth two (2) times a day. COQ10  100-100 mg-unit Cap Generic drug:  coenzyme q10-vitamin e Take  by mouth two (2) times a day. COREG 3.125 mg tablet Generic drug:  carvedilol Take 6.25 mg by mouth two (2) times a day. CRESTOR 40 mg tablet Generic drug:  rosuvastatin Take 40 mg by mouth daily. NON FORMULARY DULoxetine 60 mg capsule Commonly known as:  CYMBALTA  
120 mg daily. enalapril 20 mg tablet Commonly known as:  Sherren Bookbinder Take 1 Tab by mouth daily. gabapentin 300 mg capsule Commonly known as:  NEURONTIN Take 1 Cap by mouth two (2) times a day.  
  
 hydrOXYzine HCl 50 mg tablet Commonly known as:  ATARAX  
take 1 tablet by mouth every 12 hours LOFIBRA 134 mg capsule Generic drug:  fenofibrate micronized Take  by mouth every morning. loratadine 10 mg Cap Take  by mouth.  
  
 lubiPROStone 8 mcg capsule Commonly known as:  Alexandru Force Take 2 Caps by mouth two (2) times daily (with meals). Indications: Chronic Idiopathic Constipation MIRALAX 17 gram/dose powder Generic drug:  polyethylene glycol Take 17 g by mouth daily. multivitamin tablet Commonly known as:  ONE A DAY Take 1 Tab by mouth daily. nitrofurantoin 100 mg capsule Commonly known as:  MACRODANTIN Take 1 Cap by mouth two (2) times a day. omeprazole 20 mg capsule Commonly known as:  PRILOSEC  
two (2) times a day. spironolactone 25 mg tablet Commonly known as:  ALDACTONE Take  by mouth daily. SUMAtriptan succinate 6 mg/0.5 mL kit Commonly known as:  IMITREX STATDOSE PEN  
6 mg by SubCUTAneous route once as needed for Migraine. TRULICITY 0.61 CX/6.6 mL sub-q pen Generic drug:  dulaglutide INJECT 0.75 WEEKLY ,IN PLACE OF VICTOZA =FORMULARY Prescriptions Sent to Pharmacy Refills  
 gabapentin (NEURONTIN) 300 mg capsule 1 Sig: Take 1 Cap by mouth two (2) times a day. Class: Normal  
 Pharmacy: [address] [facility] Ph #: [phone] Route: Oral  
  
Follow-up Instructions Return in about 1 month (around 4/16/2017) for check on med changes, fibromyalgia. Patient Instructions Fibromyalgia: Care Instructions Your Care Instructions Fibromyalgia is a painful condition that is not completely understood by medical experts. The cause of fibromyalgia is not known. It can make you feel tired and ache all over. It causes tender spots at specific points of the body that hurt only when you press on them. You may have trouble sleeping, as well as other symptoms. These problems can upset your work and home life. Symptoms tend to come and go, although they may never go away completely. Fibromyalgia does not harm your muscles, joints, or organs. Follow-up care is a key part of your treatment and safety. Be sure to make and go to all appointments, and call your doctor if you are having problems. It's also a good idea to know your test results and keep a list of the medicines you take. How can you care for yourself at home? · Exercise often. Walk, swim, or bike to help with pain and sleep problems and to make you feel better. · Try to get a good night's sleep. Go to bed and get up at the same time each day, whether you feel rested or not. Make sure you have a good mattress and pillow. · Reduce stress. Avoid things that cause you stress, if you can. If not, work at making them less stressful. Learn to use biofeedback, guided imagery, meditation, or other methods to relax. · Make healthy changes. Eat a balanced diet, quit smoking, and limit alcohol and caffeine. · Use a heating pad set on low or take warm baths or showers for pain. Using cold packs for up to 20 minutes at a time can also relieve pain. Put a thin cloth between the cold pack and your skin. A gentle massage might help too. · Be safe with medicines. Take your medicines exactly as prescribed. Call your doctor if you think you are having a problem with your medicine. Your doctor may talk to you about taking antidepressant medicines. These medicines may improve sleep, relieve pain, and in some cases treat depression. · Learn about fibromyalgia. This makes coping easier. Then, take an active role in your treatment. · Think about joining a support group with others who have fibromyalgia to learn more and get support. When should you call for help? Watch closely for changes in your health, and be sure to contact your doctor if: 
· You feel sad, helpless, or hopeless; lose interest in things you used to enjoy; or have other symptoms of depression. · Your fibromyalgia symptoms get worse. Where can you learn more? Go to http://rm-shannon.info/. Enter V003 in the search box to learn more about \"Fibromyalgia: Care Instructions. \" Current as of: April 18, 2016 Content Version: 11.1 © 1162-0960 [employer]. Care instructions adapted under license by [employer] (which disclaims liability or warranty for this information). If you have questions about a medical condition or this instruction, always ask your healthcare professional. [address] any warranty or liability for your use of this information. Introducing [facility] & HEALTH SERVICES! Dear Kiki Denver: Thank you for requesting a [employer] account. Our records indicate that you already have an active [employer] account. You can access your account anytime at [email or web address] Did you know that you can access your hospital and ER discharge instructions at any time in [employer]? You can also review all of your test results from your hospital stay or ER visit. Additional Information If you have questions, please visit the Frequently Asked Questions section of the [employer] website at [email or web address] Remember, [employer] is NOT to be used for urgent needs. For medical emergencies, dial 911. Now available from your iPhone and Android! Please provide this summary of care documentation to your next provider. Your primary care clinician is listed as [facility]. If you have any questions after today's visit, please call [phone].

## 2017-03-16 NOTE — PATIENT INSTRUCTIONS
Fibromyalgia: Care Instructions  Your Care Instructions  Fibromyalgia is a painful condition that is not completely understood by medical experts. The cause of fibromyalgia is not known. It can make you feel tired and ache all over. It causes tender spots at specific points of the body that hurt only when you press on them. You may have trouble sleeping, as well as other symptoms. These problems can upset your work and home life. Symptoms tend to come and go, although they may never go away completely. Fibromyalgia does not harm your muscles, joints, or organs. Follow-up care is a key part of your treatment and safety. Be sure to make and go to all appointments, and call your doctor if you are having problems. It's also a good idea to know your test results and keep a list of the medicines you take. How can you care for yourself at home? · Exercise often. Walk, swim, or bike to help with pain and sleep problems and to make you feel better. · Try to get a good night's sleep. Go to bed and get up at the same time each day, whether you feel rested or not. Make sure you have a good mattress and pillow. · Reduce stress. Avoid things that cause you stress, if you can. If not, work at making them less stressful. Learn to use biofeedback, guided imagery, meditation, or other methods to relax. · Make healthy changes. Eat a balanced diet, quit smoking, and limit alcohol and caffeine. · Use a heating pad set on low or take warm baths or showers for pain. Using cold packs for up to 20 minutes at a time can also relieve pain. Put a thin cloth between the cold pack and your skin. A gentle massage might help too. · Be safe with medicines. Take your medicines exactly as prescribed. Call your doctor if you think you are having a problem with your medicine. Your doctor may talk to you about taking antidepressant medicines. These medicines may improve sleep, relieve pain, and in some cases treat depression.   · Learn about fibromyalgia. This makes coping easier. Then, take an active role in your treatment. · Think about joining a support group with others who have fibromyalgia to learn more and get support. When should you call for help? Watch closely for changes in your health, and be sure to contact your doctor if:  · You feel sad, helpless, or hopeless; lose interest in things you used to enjoy; or have other symptoms of depression. · Your fibromyalgia symptoms get worse. Where can you learn more? Go to http://rm-shannon.info/. Enter V003 in the search box to learn more about \"Fibromyalgia: Care Instructions. \"  Current as of: April 18, 2016  Content Version: 11.1  © 8620-4187 [employer], [employer]. Care instructions adapted under license by [employer] (which disclaims liability or warranty for this information). If you have questions about a medical condition or this instruction, always ask your healthcare professional. [address] any warranty or liability for your use of this information.

## 2017-04-05 DIAGNOSIS — Z78.0 MENOPAUSE: ICD-10-CM

## 2017-05-25 DIAGNOSIS — Z76.0 MEDICATION REFILL: ICD-10-CM

## 2017-05-25 RX ORDER — ENALAPRIL MALEATE 20 MG/1
20 TABLET ORAL DAILY
Qty: 30 TAB | Refills: 5 | Status: SHIPPED | OUTPATIENT
Start: 2017-05-25 | End: 2017-11-26 | Stop reason: SDUPTHER

## 2017-05-25 NOTE — TELEPHONE ENCOUNTER
Requested Prescriptions     Pending Prescriptions Disp Refills    enalapril (VASOTEC) 20 mg tablet 30 Tab 5     Sig: Take 1 Tab by mouth daily.

## 2017-05-31 LAB — HBA1C MFR BLD HPLC: 6.3 %

## 2017-06-06 ENCOUNTER — DOCUMENTATION ONLY (OUTPATIENT)
Dept: INTERNAL MEDICINE CLINIC | Age: 57
End: 2017-06-06

## 2017-07-17 DIAGNOSIS — K59.04 CHRONIC IDIOPATHIC CONSTIPATION: ICD-10-CM

## 2017-07-17 RX ORDER — LUBIPROSTONE 8 UG/1
16 CAPSULE, GELATIN COATED ORAL 2 TIMES DAILY WITH MEALS
Qty: 120 CAP | Refills: 5 | Status: SHIPPED | OUTPATIENT
Start: 2017-07-17 | End: 2018-05-28 | Stop reason: SDUPTHER

## 2017-07-17 NOTE — TELEPHONE ENCOUNTER
Requested Prescriptions     Pending Prescriptions Disp Refills    lubiPROStone (AMITIZA) 8 mcg capsule 120 Cap 5     Sig: Take 2 Caps by mouth two (2) times daily (with meals).  Indications: chronic idiopathic constipation

## 2017-07-18 ENCOUNTER — OFFICE VISIT (OUTPATIENT)
Dept: INTERNAL MEDICINE CLINIC | Age: 57
End: 2017-07-18

## 2017-07-18 VITALS
WEIGHT: 260 LBS | BODY MASS INDEX: 40.81 KG/M2 | OXYGEN SATURATION: 97 % | TEMPERATURE: 98 F | RESPIRATION RATE: 18 BRPM | HEART RATE: 93 BPM | DIASTOLIC BLOOD PRESSURE: 61 MMHG | SYSTOLIC BLOOD PRESSURE: 103 MMHG | HEIGHT: 67 IN

## 2017-07-18 DIAGNOSIS — E11.9 TYPE 2 DIABETES MELLITUS WITHOUT COMPLICATION, WITHOUT LONG-TERM CURRENT USE OF INSULIN (HCC): Primary | Chronic | ICD-10-CM

## 2017-07-18 DIAGNOSIS — I10 ESSENTIAL HYPERTENSION: ICD-10-CM

## 2017-07-18 DIAGNOSIS — E78.5 DYSLIPIDEMIA: ICD-10-CM

## 2017-07-18 DIAGNOSIS — M17.0 PRIMARY OSTEOARTHRITIS OF BOTH KNEES: ICD-10-CM

## 2017-07-18 PROBLEM — N39.0 UTI (URINARY TRACT INFECTION): Status: RESOLVED | Noted: 2017-02-27 | Resolved: 2017-07-18

## 2017-07-18 NOTE — PROGRESS NOTES
HISTORY OF PRESENT ILLNESS  Irma Naidu is a 65 y.o. female. Visit Vitals    /61    Pulse 93    Temp 98 °F (36.7 °C) (Oral)    Resp 18    Ht 5' 7\" (1.702 m)    Wt 260 lb (117.9 kg)    SpO2 97%    BMI 40.72 kg/m2       HPI Comments: Checks her Blood sugar twice to 4 times a week    She brought in lab from Dr. Lennie Jung. Her HBA1c is down--6.1  Her LDL is good--51    Diabetes   The history is provided by the patient. This is a chronic problem. The current episode started more than 1 week ago. The problem occurs daily. The problem has not changed since onset. Pertinent negatives include no chest pain and no headaches. Exacerbated by: diet. The symptoms are relieved by medications (diet). Treatments tried: see med list. The treatment provided moderate relief. Cholesterol Problem   The history is provided by the patient. This is a chronic problem. The current episode started more than 1 week ago. The problem occurs daily. The problem has not changed since onset. Pertinent negatives include no chest pain and no headaches. Exacerbated by: diet. The symptoms are relieved by medications (diet). Treatments tried: see med lsit. The treatment provided mild relief. Hypertension    The history is provided by the patient. This is a chronic problem. The current episode started more than 1 week ago. The problem has not changed since onset. Pertinent negatives include no chest pain, no palpitations, no headaches and no dizziness. There are no associated agents to hypertension. Risk factors include postmenopause, stress, a sedentary lifestyle and obesity. Review of Systems   Constitutional: Negative for chills and fever. Cardiovascular: Negative for chest pain and palpitations. Genitourinary: Negative for frequency and urgency. Neurological: Negative for dizziness and headaches. Endo/Heme/Allergies: Negative for polydipsia.        Physical Exam   Constitutional: She is oriented to person, place, and time. She appears well-developed and well-nourished. No distress. Cardiovascular: Normal rate and regular rhythm. Pulmonary/Chest: Effort normal and breath sounds normal.   Musculoskeletal: She exhibits no edema. Neurological: She is alert and oriented to person, place, and time. Skin: Skin is warm and dry. She is not diaphoretic. Psychiatric: She has a normal mood and affect. Nursing note and vitals reviewed. ASSESSMENT and PLAN    ICD-10-CM ICD-9-CM    1. Type 2 diabetes mellitus without complication, without long-term current use of insulin (HCC) E11.9 250.00    2. Primary osteoarthritis of both knees M17.0 715.16    3. Dyslipidemia E78.5 272.4    4. Essential hypertension I10 401.9        Her main frustration is her knees. She has bone on bone and ortho will not do surgery until her BMI is under 40. She is seeing a weight doctor and will discuss whether there is a nutritionist available. reviewed lab which is excellent. Reviewed Dr. Yahaira Jarrell note. Dr. Lynnea Sicard is missing    BP looks great.   Chol is controlled    got her new sleep apnea machine and supplies    F/u January for [address]

## 2017-07-18 NOTE — PROGRESS NOTES
Chief Complaint   Patient presents with    Diabetes    Cholesterol Problem    Hypertension       Pt preferred language for health care discussion is [place]. Is someone accompanying this pt? no    Is the patient using any DME equipment during OV? no    Depression Screening:  PHQ over the last two weeks 7/18/2017 3/16/2017 12/12/2016 10/13/2016 8/30/2016 8/16/2016 5/9/2016   PHQ Not Done Active Diagnosis of Depression or Bipolar Disorder Active Diagnosis of Depression or Bipolar Disorder - - Active Diagnosis of Depression or Bipolar Disorder Active Diagnosis of Depression or Bipolar Disorder -   Little interest or pleasure in doing things Not at all - Several days Not at all - - Several days   Feeling down, depressed or hopeless Not at all - Several days Not at all - - Several days   Total Score PHQ 2 0 - 2 0 - - 2       Learning Assessment:  Learning Assessment 11/24/2014   PRIMARY LEARNER Patient   HIGHEST LEVEL OF EDUCATION - PRIMARY LEARNER  2 YEARS OF COLLEGE   BARRIERS PRIMARY LEARNER NONE   CO-LEARNER CAREGIVER No   PRIMARY LANGUAGE ENGLISH   LEARNER PREFERENCE PRIMARY DEMONSTRATION   ANSWERED BY self   RELATIONSHIP SELF       Abuse Screening: Too young    Health Maintenance reviewed and discussed per provider. Yes, all HM postponed by Provider      Advance Directive:  1. Do you have an advance directive in place? Patient Reply:no    2. If not, would you like material regarding how to put one in place? Patient Reply: no    Coordination of Care:  1. Have you been to the ER, urgent care clinic since your last visit? Hospitalized since your last visit? no    2. Have you seen or consulted any other health care providers outside of the [address] [place] [address] since your last visit? Include any pap smears or colon screening.  Dr. Ana Mcintyre

## 2017-07-18 NOTE — MR AVS SNAPSHOT
Visit Information Date & Time Provider Department Dept. Phone Encounter #  
 7/18/2017 10:45 AM Angie Gorman, [address] [id number] Follow-up Instructions Return in about 6 months (around 1/18/2018) for Medicare Wellness Visit, HTN, DM, cholesterol. Your Appointments 7/24/2017  1:30 PM  
PROCEDURE with Fang Alejandre MD  
[facility]. [address]) Appt Note: Return in about 4 months (around 7/22/2017) for F/U after botox.; Botox 100 Units [address] [address]  
[phone]  
  
   
 [place] [id number] Upcoming Health Maintenance Date Due  
 PAP AKA CERVICAL CYTOLOGY 9/25/2017 Pneumococcal 19-64 Medium Risk (1 of 1 - PPSV23) 1/14/2018* INFLUENZA AGE 9 TO ADULT 8/1/2017 HEMOGLOBIN A1C Q6M 11/30/2017 FOOT EXAM Q1 1/16/2018 MICROALBUMIN Q1 1/16/2018 LIPID PANEL Q1 1/16/2018 EYE EXAM RETINAL OR DILATED Q1 1/31/2018 BREAST CANCER SCRN MAMMOGRAM 12/7/2018 DTaP/Tdap/Td series (2 - Td) 11/1/2022 COLONOSCOPY 4/1/2024 *Topic was postponed. The date shown is not the original due date. Allergies as of 7/18/2017  Review Complete On: 7/18/2017 By: Angie Gorman MD  
  
 Severity Noted Reaction Type Reactions Digoxin  11/24/2014   Side Effect Nausea Only Niaspan [Niacin]  10/31/2011   Side Effect Rash Wellbutrin [Bupropion Hcl]  11/24/2014   Side Effect Itching Current Immunizations  Reviewed on 9/27/2013 Name Date Influenza Vaccine 9/27/2013  3:46 PM  
 Influenza Vaccine (Quad) PF 10/13/2016, 12/17/2015  2:37 PM  
 Influenza Vaccine PF 10/20/2014 Influenza Vaccine Split 11/1/2012  9:46 AM  
 Pneumococcal Conjugate (PCV-13) 12/12/2016 TDAP Vaccine 11/1/2012  9:45 AM  
  
 Not reviewed this visit You Were Diagnosed With   
  
 Codes Comments Type 2 diabetes mellitus without complication, without long-term current use of insulin (HCC)    -  Primary ICD-10-CM: E11.9 ICD-9-CM: 250.00 Primary osteoarthritis of both knees     ICD-10-CM: M17.0 ICD-9-CM: 715.16 Dyslipidemia     ICD-10-CM: E78.5 ICD-9-CM: 272.4 Essential hypertension     ICD-10-CM: I10 
ICD-9-CM: 401.9 Vitals BP Pulse Temp Resp Height(growth percentile) Weight(growth percentile) 103/61 93 98 °F (36.7 °C) (Oral) 18 5' 7\" (1.702 m) 260 lb (117.9 kg) SpO2 BMI OB Status Smoking Status 97% 40.72 kg/m2 Postmenopausal Never Smoker BMI and BSA Data Body Mass Index Body Surface Area 40.72 kg/m 2 2.36 m 2 Preferred Pharmacy Pharmacy Name Phone [facility] [address]  [facility] [phone] Your Updated Medication List  
  
   
This list is accurate as of: 7/18/17 11:19 AM.  Always use your most recent med list.  
  
  
  
  
 alendronate 70 mg tablet Commonly known as:  FOSAMAX Take 1 Tab by mouth every seven (7) days. aspirin 81 mg chewable tablet Take 81 mg by mouth daily. BD INSULIN PEN NEEDLE UF MINI 31 gauge x 3/16\" Ndle Generic drug:  Insulin Needles (Disposable) CALCIUM 600 + D(3) PO Take 2 Caps by mouth daily. carvedilol 6.25 mg tablet Commonly known as:  Luis E Mood Take  by mouth two (2) times daily (with meals). clonazePAM 0.5 mg tablet Commonly known as:  Clifton Gibes Take  by mouth two (2) times a day. COQ10  100-100 mg-unit Cap Generic drug:  coenzyme q10-vitamin e Take  by mouth two (2) times a day. CRESTOR 40 mg tablet Generic drug:  rosuvastatin Take 40 mg by mouth daily. NON FORMULARY DULoxetine 60 mg capsule Commonly known as:  CYMBALTA  
120 mg daily. enalapril 20 mg tablet Commonly known as:  Joe Kody Take 1 Tab by mouth daily.   
  
 estradiol 0.01 % (0.1 mg/gram) vaginal cream  
 Commonly known as:  ESTRACE Insert 2 g into vagina daily. Apply fingertip amount to outside of vaginal opening.  
  
 hydrOXYzine HCl 50 mg tablet Commonly known as:  ATARAX  
take 1 tablet by mouth every 12 hours LOFIBRA 134 mg capsule Generic drug:  fenofibrate micronized Take  by mouth every morning. loratadine 10 mg Cap Take  by mouth.  
  
 lubiPROStone 8 mcg capsule Commonly known as:  Marlinda Rajwinders Take 2 Caps by mouth two (2) times daily (with meals). Indications: chronic idiopathic constipation  
  
 metFORMIN 500 mg Tg24 24 hour tablet Commonly known asMarinus Bret ER Take  by mouth. 2 pills every evening MIRALAX 17 gram/dose powder Generic drug:  polyethylene glycol Take 17 g by mouth daily. multivitamin tablet Commonly known as:  ONE A DAY Take 1 Tab by mouth daily. nystatin powder Commonly known as:  MYCOSTATIN Apply  to affected area as needed. omeprazole 20 mg capsule Commonly known as:  PRILOSEC  
two (2) times a day. spironolactone 25 mg tablet Commonly known as:  ALDACTONE Take  by mouth daily. SUMAtriptan succinate 6 mg/0.5 mL kit Commonly known as:  IMITREX STATDOSE PEN  
6 mg by SubCUTAneous route once as needed for Migraine. TRULICITY 1.5 UA/7.8 mL sub-q pen Generic drug:  dulaglutide 1.5 mg by SubCUTAneous route every seven (7) days. Follow-up Instructions Return in about 6 months (around 1/18/2018) for Medicare Wellness Visit, HTN, DM, cholesterol. Introducing [facility]! Dear Mony Mckeon: Thank you for requesting a [employer] account. Our records indicate that you already have an active [employer] account. You can access your account anytime at [email or web address] Did you know that you can access your hospital and ER discharge instructions at any time in [employer]? You can also review all of your test results from your hospital stay or ER visit. Additional Information If you have questions, please visit the Frequently Asked Questions section of the [employer] website at [email or web address] Remember, [employer] is NOT to be used for urgent needs. For medical emergencies, dial 911. Now available from your iPhone and Android! Please provide this summary of care documentation to your next provider. Your primary care clinician is listed as [facility]. If you have any questions after today's visit, please call [phone].

## 2017-10-17 ENCOUNTER — CLINICAL SUPPORT (OUTPATIENT)
Dept: INTERNAL MEDICINE CLINIC | Age: 57
End: 2017-10-17

## 2017-10-17 DIAGNOSIS — Z23 ENCOUNTER FOR IMMUNIZATION: Primary | ICD-10-CM

## 2017-10-17 NOTE — PROGRESS NOTES
Presented for Influenza Vaccine. Administered in left deltoid without complaints. Pt observed for 5 min. No adverse reaction noted.  Pt left office in stable condition

## 2017-11-26 DIAGNOSIS — Z76.0 MEDICATION REFILL: ICD-10-CM

## 2017-11-26 RX ORDER — ENALAPRIL MALEATE 20 MG/1
TABLET ORAL
Qty: 30 TAB | Refills: 5 | Status: SHIPPED | OUTPATIENT
Start: 2017-11-26 | End: 2018-06-18 | Stop reason: SDUPTHER

## 2017-12-04 LAB
LDL-C, EXTERNAL: 37
MICROALBUMIN UR TEST STR-MCNC: NORMAL MG/DL

## 2017-12-14 ENCOUNTER — OFFICE VISIT (OUTPATIENT)
Dept: INTERNAL MEDICINE CLINIC | Age: 57
End: 2017-12-14

## 2017-12-14 VITALS
BODY MASS INDEX: 39.39 KG/M2 | DIASTOLIC BLOOD PRESSURE: 65 MMHG | SYSTOLIC BLOOD PRESSURE: 104 MMHG | TEMPERATURE: 97.8 F | HEIGHT: 67 IN | HEART RATE: 91 BPM | WEIGHT: 251 LBS | OXYGEN SATURATION: 98 % | RESPIRATION RATE: 18 BRPM

## 2017-12-14 DIAGNOSIS — M54.2 NECK PAIN: ICD-10-CM

## 2017-12-14 DIAGNOSIS — L30.9 ECZEMA, UNSPECIFIED TYPE: ICD-10-CM
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
## 2017-12-14 DIAGNOSIS — I10 ESSENTIAL HYPERTENSION: Chronic | ICD-10-CM

## 2017-12-14 DIAGNOSIS — R51.9 NEW ONSET HEADACHE: ICD-10-CM

## 2017-12-14 DIAGNOSIS — E11.9 TYPE 2 DIABETES MELLITUS WITHOUT COMPLICATION, WITHOUT LONG-TERM CURRENT USE OF INSULIN (HCC): Primary | Chronic | ICD-10-CM

## 2017-12-14 PROBLEM — E11.21 TYPE 2 DIABETES MELLITUS WITH NEPHROPATHY (HCC): Status: ACTIVE | Noted: 2017-12-14

## 2017-12-14 RX ORDER — TRIAMCINOLONE ACETONIDE 0.25 MG/G
CREAM TOPICAL 2 TIMES DAILY
Qty: 15 G | Refills: 5 | Status: SHIPPED | OUTPATIENT
Start: 2017-12-14 | End: 2018-08-01 | Stop reason: SDUPTHER

## 2017-12-14 RX ORDER — TIZANIDINE 4 MG/1
4 TABLET ORAL
Qty: 30 TAB | Refills: 1 | Status: SHIPPED | OUTPATIENT
Start: 2017-12-14 | End: 2018-08-01 | Stop reason: SDUPTHER

## 2017-12-14 NOTE — PROGRESS NOTES
ROOM # 5    Sherryn Holter presents today for   Chief Complaint   Patient presents with    Neck Pain     pt had a fall on Dec 4th    Back Pain    Mass     right side of neck       Sherryn Holter preferred language for health care discussion is english/other. Is someone accompanying this pt? no    Is the patient using any DME equipment during OV? no    Depression Screening:  PHQ over the last two weeks 12/14/2017 7/18/2017 3/16/2017 12/12/2016 10/13/2016 8/30/2016 8/16/2016   PHQ Not Done - Active Diagnosis of Depression or Bipolar Disorder Active Diagnosis of Depression or Bipolar Disorder - - Active Diagnosis of Depression or Bipolar Disorder Active Diagnosis of Depression or Bipolar Disorder   Little interest or pleasure in doing things Not at all Not at all - Several days Not at all - -   Feeling down, depressed or hopeless Not at all Not at all - Several days Not at all - -   Total Score PHQ 2 0 0 - 2 0 - -       Learning Assessment:  Learning Assessment 11/24/2014   PRIMARY LEARNER Patient   HIGHEST LEVEL OF EDUCATION - PRIMARY LEARNER  2 YEARS [facility] PRIMARY LEARNER NONE   CO-LEARNER CAREGIVER No   PRIMARY LANGUAGE ENGLISH   LEARNER PREFERENCE PRIMARY DEMONSTRATION   ANSWERED BY self   RELATIONSHIP SELF       Abuse Screening:  No flowsheet data found. Fall Risk  No flowsheet data found. Health Maintenance reviewed and discussed per provider. Yes    Sherryn Holter is due for pap. Please order/place referral if appropriate. Advance Directive:  1. Do you have an advance directive in place? Patient Reply: no    2. If not, would you like material regarding how to put one in place? Patient Reply: no    Coordination of Care:  1. Have you been to the ER, urgent care clinic since your last visit? Hospitalized since your last visit? no    2. Have you seen or consulted any other health care providers outside of the [address] [place] [address] since your last visit? Include any pap smears or colon screening.  no

## 2017-12-14 NOTE — MR AVS SNAPSHOT
Visit Information Date & Time Provider Department Dept. Phone Encounter #  
 12/14/2017  3:00 PM [address] [phone] [id number] Follow-up Instructions Return in about 2 weeks (around 12/28/2017) for recheck neck and headache. Your Appointments 1/9/2018 10:10 AM  
Nurse Visit with [facility] POD4 NURSE [facility]. Madi Grimm [address] ([facility]) Appt Note: C&S Botox scheduled for 1/22 with  [address] [facility] ([place], [place]) [phone]  
  
    
 1/22/2018  1:30 PM  
PROCEDURE with Justo Colvin MD  
[facility]. Madi Grimm 98 [facility]) Appt Note: Botox 200 Units 301 [address] [address]  
[phone]  
  
   
 [facility] [phone] Upcoming Health Maintenance Date Due  
 PAP AKA CERVICAL CYTOLOGY 9/25/2017 Pneumococcal 19-64 Medium Risk (1 of 1 - PPSV23) 1/14/2018* FOOT EXAM Q1 1/16/2018 MICROALBUMIN Q1 1/16/2018 EYE EXAM RETINAL OR DILATED Q1 1/31/2018 HEMOGLOBIN A1C Q6M 2/1/2018 LIPID PANEL Q1 5/17/2018 DTaP/Tdap/Td series (2 - Td) 11/1/2022 COLONOSCOPY 4/1/2024 *Topic was postponed. The date shown is not the original due date. Allergies as of 12/14/2017  Review Complete On: 12/14/2017 By: Nery Jules LPN Severity Noted Reaction Type Reactions Digoxin  11/24/2014   Side Effect Nausea Only Niaspan [Niacin]  10/31/2011   Side Effect Rash Wellbutrin [Bupropion Hcl]  11/24/2014   Side Effect Itching Current Immunizations  Reviewed on 9/27/2013 Name Date Influenza Vaccine 9/27/2013  3:46 PM  
 Influenza Vaccine (Quad) PF 10/17/2017, 10/13/2016, 12/17/2015  2:37 PM  
 Influenza Vaccine PF 10/20/2014 Influenza Vaccine Split 11/1/2012  9:46 AM  
 Pneumococcal Conjugate (PCV-13) 12/12/2016 TDAP Vaccine 11/1/2012  9:45 AM  
  
 Not reviewed this visit You Were Diagnosed With   
  
 Codes Comments Type 2 diabetes mellitus without complication, without long-term current use of insulin (HCC)    -  Primary ICD-10-CM: E11.9 ICD-9-CM: 250.00 Essential hypertension     ICD-10-CM: I10 
ICD-9-CM: 401.9 Eczema, unspecified type     ICD-10-CM: L30.9 ICD-9-CM: 692.9 Neck pain     ICD-10-CM: M54.2 ICD-9-CM: 723.1 New onset headache     ICD-10-CM: R51 ICD-9-CM: 784.0 Fall, initial encounter     ICD-10-CM: W19. Luis Antonio Britt ICD-9-CM: E888.9 Vitals BP Pulse Temp Resp Height(growth percentile) Weight(growth percentile) 104/65 (BP 1 Location: Left arm, BP Patient Position: Sitting) 91 97.8 °F (36.6 °C) (Oral) 18 5' 7\" (1.702 m) 251 lb (113.9 kg) SpO2 BMI OB Status Smoking Status 98% 39.31 kg/m2 Postmenopausal Never Smoker Vitals History BMI and BSA Data Body Mass Index Body Surface Area  
 39.31 kg/m 2 2.32 m 2 Preferred Pharmacy Pharmacy Name Phone [facility] [address]  [facility] [phone] Your Updated Medication List  
  
   
This list is accurate as of: 12/14/17  3:59 PM.  Always use your most recent med list.  
  
  
  
  
 alendronate 70 mg tablet Commonly known as:  FOSAMAX Take 1 Tab by mouth every seven (7) days. aspirin 81 mg chewable tablet Take 81 mg by mouth daily. BD INSULIN PEN NEEDLE UF MINI 31 gauge x 3/16\" Ndle Generic drug:  Insulin Needles (Disposable) CALCIUM 600 + D(3) PO Take 2 Caps by mouth daily. carvedilol 6.25 mg tablet Commonly known as:  Haskel Scarce Take  by mouth two (2) times daily (with meals). cephALEXin 500 mg capsule Commonly known as:  KEFLEX  
  
 clonazePAM 0.5 mg tablet Commonly known as:  Wilhelmena Listen Take  by mouth two (2) times a day. COQ10  100-100 mg-unit Cap Generic drug:  coenzyme q10-vitamin e  
 Take  by mouth two (2) times a day. CRESTOR 40 mg tablet Generic drug:  rosuvastatin Take 40 mg by mouth daily. NON FORMULARY DULoxetine 60 mg capsule Commonly known as:  CYMBALTA  
120 mg daily. enalapril 20 mg tablet Commonly known as:  VASOTEC  
take 1 tablet by mouth once daily  
  
 estradiol 0.01 % (0.1 mg/gram) vaginal cream  
Commonly known as:  ESTRACE Insert 2 g into vagina daily. Apply fingertip amount to outside of vaginal opening.  
  
 hydrOXYzine HCl 50 mg tablet Commonly known as:  ATARAX  
take 1 tablet by mouth every 12 hours LOFIBRA 134 mg capsule Generic drug:  fenofibrate micronized Take  by mouth every morning. loratadine 10 mg Cap Take  by mouth.  
  
 lubiPROStone 8 mcg capsule Commonly known as:  Ranell Susan Take 2 Caps by mouth two (2) times daily (with meals). Indications: chronic idiopathic constipation * metFORMIN 500 mg Tg24 24 hour tablet Commonly known asCristela Rumpf ER Take  by mouth. 2 pills every evening * metFORMIN  mg tablet Commonly known as:  GLUCOPHAGE XR  
  
 MIRALAX 17 gram/dose powder Generic drug:  polyethylene glycol Take 17 g by mouth daily. multivitamin tablet Commonly known as:  ONE A DAY Take 1 Tab by mouth daily. nystatin powder Commonly known as:  MYCOSTATIN Apply  to affected area as needed. omeprazole 20 mg capsule Commonly known as:  PRILOSEC  
two (2) times a day. spironolactone 25 mg tablet Commonly known as:  ALDACTONE Take  by mouth daily. SUMAtriptan succinate 6 mg/0.5 mL kit Commonly known as:  IMITREX STATDOSE PEN  
6 mg by SubCUTAneous route once as needed for Migraine. tiZANidine 4 mg tablet Commonly known as:  Sammie Crape Take 1 Tab by mouth two (2) times daily as needed. Indications: Muscle Spasm  
  
 triamcinolone acetonide 0.025 % topical cream  
Commonly known as:  KENALOG Apply  to affected area two (2) times a day. use thin layer * TRULICITY 1.5 XP/6.2 mL sub-q pen Generic drug:  dulaglutide 1.5 mg by SubCUTAneous route every seven (7) days. * TRULICITY 6.64 HZ/4.4 mL sub-q pen Generic drug:  dulaglutide * Notice: This list has 4 medication(s) that are the same as other medications prescribed for you. Read the directions carefully, and ask your doctor or other care provider to review them with you. Prescriptions Sent to Pharmacy Refills  
 triamcinolone acetonide (KENALOG) 0.025 % topical cream 5 Sig: Apply  to affected area two (2) times a day. use thin layer Class: Normal  
 Pharmacy: [address] Ph #: [phone] Route: Topical  
 tiZANidine (ZANAFLEX) 4 mg tablet 1 Sig: Take 1 Tab by mouth two (2) times daily as needed. Indications: Muscle Spasm Class: Normal  
 Pharmacy: [address] Ph #: [phone] Route: Oral  
  
Follow-up Instructions Return in about 2 weeks (around 12/28/2017) for recheck neck and headache. To-Do List   
 12/14/2017 Imaging:  CT HEAD WO CONT   
  
 12/14/2017 Imaging:  XR SPINE CERV FLEX/EXT MAX 3 V [facility]! Dear Devon Bucioters: Thank you for requesting a [employer] account. Our records indicate that you already have an active [employer] account. You can access your account anytime at [email or web address] Did you know that you can access your hospital and ER discharge instructions at any time in [employer]? You can also review all of your test results from your hospital stay or ER visit. Additional Information If you have questions, please visit the Frequently Asked Questions section of the [employer] website at [email or web address] Remember, [employer] is NOT to be used for urgent needs.  For medical emergencies, dial 911. Now available from your iPhone and Android! Please provide this summary of care documentation to your next provider. Your primary care clinician is listed as [facility]. If you have any questions after today's visit, please call [phone].

## 2017-12-14 NOTE — PROGRESS NOTES
HISTORY OF PRESENT ILLNESS  Christopher Delgado is a 65 y.o. female. Visit Vitals    /65 (BP 1 Location: Left arm, BP Patient Position: Sitting)    Pulse 91    Temp 97.8 °F (36.6 °C) (Oral)    Resp 18    Ht 5' 7\" (1.702 m)    Wt 251 lb (113.9 kg)    SpO2 98%    BMI 39.31 kg/m2       HPI Comments: Pt fell on Dec 2. She walking and found herself face down. She does not recall how she fell. She had a little finger and toe numbness at the time but did not think she was hurt . Bur the next day she was  \"Sore. \" Her back was hurting \"all over. \" She took some advil and ibuprofen and got better. But 3 days ago she awoke with a \"piercing\" headache that comes and goes and she had pain in the back of her neck as well. Some tingling in her right fingertips still. Her tailbone was sore right after the fall. This is better. He main concern is the headache and neck ache. She also reports ringing in her ears. Seems to be worse in the right ear. No change in her bowels or bladder and walk. Neck Pain   The history is provided by the patient (see comments). This is a new problem. The current episode started more than 1 week ago. The problem occurs daily. The problem has not changed since onset. Back Pain    The history is provided by the patient. This is a new problem. The current episode started more than 1 week ago. The problem has not changed since onset. The problem occurs daily. Mass         Review of Systems   Musculoskeletal: Positive for back pain and neck pain. Physical Exam   Constitutional: She is oriented to person, place, and time. She appears well-developed and well-nourished. No distress. HENT:   Right Ear: External ear normal.   Left Ear: External ear normal.   TMs OK   Eyes: Conjunctivae and EOM are normal. Pupils are equal, round, and reactive to light. Neck:   Small 4 mm comedone on lateral right neck. Some cheesy material expressed   Cardiovascular: Normal rate and regular rhythm. Pulmonary/Chest: Effort normal and breath sounds normal.   Musculoskeletal: She exhibits no edema. Neurological: She is alert and oriented to person, place, and time. She displays normal reflexes. No cranial nerve deficit. She exhibits normal muscle tone. Coordination normal.   Good upper reflexes. Skin: Skin is warm and dry. She is not diaphoretic. Psychiatric: She has a normal mood and affect. Nursing note and vitals reviewed. ASSESSMENT and PLAN    ICD-10-CM ICD-9-CM    1. Type 2 diabetes mellitus without complication, without long-term current use of insulin (HCC) E11.9 250.00    2. Essential hypertension I10 401.9    3. Eczema, unspecified type L30.9 692.9 triamcinolone acetonide (KENALOG) 0.025 % topical cream   4. Neck pain M54.2 723.1 XR SPINE CERV FLEX/EXT MAX 3 V   5. New onset headache R51 784.0 CT HEAD WO CONT   6. Fall, initial encounter [address] Crissie Pel C545.1 CT HEAD WO CONT      reviewed report form [facility] [place]    Discussed BMI/weight, lifestyle, diet and exercise. Pt had an appt in July that showed her at 280#--she has never weighed that much. A few days after weighing only 260. I believe that weight was wrong. She is seeing a weigh management person now and has lost a little weight. Will xray c-spine and sent for head CT.   I suspect her headache is due to neck injury    F/u 2 weeks for recheck of neck and headache  Otherwise f/u 4 months

## 2017-12-19 ENCOUNTER — HOSPITAL ENCOUNTER (OUTPATIENT)
Dept: CT IMAGING | Age: 57
Discharge: HOME | End: 2017-12-19
Attending: INTERNAL MEDICINE
Payer: MEDICARE

## 2017-12-19 DIAGNOSIS — R51.9 NEW ONSET HEADACHE: ICD-10-CM
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
## 2017-12-19 PROCEDURE — 70450 CT HEAD/BRAIN W/O DYE: CPT

## 2017-12-28 ENCOUNTER — OFFICE VISIT (OUTPATIENT)
Dept: INTERNAL MEDICINE CLINIC | Age: 57
End: 2017-12-28

## 2017-12-28 VITALS
HEIGHT: 67 IN | WEIGHT: 247 LBS | HEART RATE: 96 BPM | BODY MASS INDEX: 38.77 KG/M2 | OXYGEN SATURATION: 98 % | DIASTOLIC BLOOD PRESSURE: 68 MMHG | RESPIRATION RATE: 16 BRPM | SYSTOLIC BLOOD PRESSURE: 92 MMHG | TEMPERATURE: 98 F

## 2017-12-28 DIAGNOSIS — E11.21 TYPE 2 DIABETES MELLITUS WITH NEPHROPATHY (HCC): ICD-10-CM

## 2017-12-28 DIAGNOSIS — M62.838 MUSCLE SPASM: ICD-10-CM

## 2017-12-28 DIAGNOSIS — E11.9 TYPE 2 DIABETES MELLITUS WITHOUT COMPLICATION, WITHOUT LONG-TERM CURRENT USE OF INSULIN (HCC): Chronic | ICD-10-CM

## 2017-12-28 DIAGNOSIS — Z76.0 MEDICATION REFILL: ICD-10-CM

## 2017-12-28 DIAGNOSIS — M54.2 NECK PAIN: Primary | ICD-10-CM

## 2017-12-28 DIAGNOSIS — R09.81 NASAL CONGESTION: ICD-10-CM

## 2017-12-28 RX ORDER — HYDROCODONE POLISTIREX AND CHLORPHENIRAMINE POLISTIREX 10; 8 MG/5ML; MG/5ML
1 SUSPENSION, EXTENDED RELEASE ORAL
Qty: 115 ML | Status: SHIPPED | OUTPATIENT
Start: 2017-12-28 | End: 2018-01-22 | Stop reason: ALTCHOICE

## 2017-12-28 NOTE — PROGRESS NOTES
Chief Complaint   Patient presents with    Neck Pain    Headache       Pt preferred language for health care discussion is english. Is someone accompanying this pt? no    Is the patient using any DME equipment during OV? no    Depression Screening:  PHQ over the last two weeks 12/14/2017 7/18/2017 3/16/2017 12/12/2016 10/13/2016 8/30/2016 8/16/2016   PHQ Not Done - Active Diagnosis of Depression or Bipolar Disorder Active Diagnosis of Depression or Bipolar Disorder - - Active Diagnosis of Depression or Bipolar Disorder Active Diagnosis of Depression or Bipolar Disorder   Little interest or pleasure in doing things Not at all Not at all - Several days Not at all - -   Feeling down, depressed or hopeless Not at all Not at all - Several days Not at all - -   Total Score PHQ 2 0 0 - 2 0 - -       Learning Assessment:  Learning Assessment 11/24/2014   PRIMARY LEARNER Patient   HIGHEST LEVEL OF EDUCATION - PRIMARY LEARNER  2 YEARS [facility] PRIMARY LEARNER NONE   CO-LEARNER CAREGIVER No   PRIMARY LANGUAGE ENGLISH   LEARNER PREFERENCE PRIMARY DEMONSTRATION   ANSWERED BY self   RELATIONSHIP SELF       Abuse Screening:  No flowsheet data found. Health Maintenance reviewed and discussed per provider. Yes    Pt is due for pap smear, foot exam, microalbumin. Please order/place referral if appropriate. Advance Directive:  1. Do you have an advance directive in place? Patient Reply:no    2. If not, would you like material regarding how to put one in place? Patient Reply: no      Coordination of Care:  1. Have you been to the ER, urgent care clinic since your last visit? Hospitalized since your last visit? no    2. Have you seen or consulted any other health care providers outside of the [address] [place] [address] since your last visit? Include any pap smears or colon screening. Yes cardiology    Please see Red banners under Allergies, Med rec, Immunizations to remove outside inquires.  All correct information has been verified with patient and added to chart. Medication variance in dosage/sig per patient as follows: Trulicity 1.5 mg to be removed, there are two Metformins that one needs to be removed.   Her coreg she is taking 6.25 mg two in the am two in the PM until gone then she is to  the 12.5 mg tablets

## 2017-12-28 NOTE — ASSESSMENT & PLAN NOTE
This condition is managed by Specialist.  Key Antihyperglycemic Medications             TRULICITY 2.58 GZ/7.6 mL sub-q pen  (Taking)     metFORMIN ER (GLUCOPHAGE XR) 500 mg tablet     metFORMIN (GLUMETZA ER) 500 mg TG24 24 hour tablet Take  by mouth. 2 pills every evening    dulaglutide (TRULICITY) 1.5 RL/9.3 mL sub-q pen 1.5 mg by SubCUTAneous route every seven (7) days. Other Key Diabetic Medications             enalapril (VASOTEC) 20 mg tablet  (Taking) take 1 tablet by mouth once daily    clonazepam (KLONOPIN) 0.5 mg tablet  (Taking) Take  by mouth two (2) times a day. rosuvastatin (CRESTOR) 40 mg tablet  (Taking) Take 40 mg by mouth daily. NON FORMULARY     fenofibrate micronized (LOFIBRA) 134 mg capsule  (Taking) Take  by mouth every morning. Lab Results   Component Value Date/Time    Hemoglobin A1c 6.5 08/01/2017 04:07 PM    Hemoglobin A1c, External 6.3 05/31/2017    Glucose 76 08/01/2017 04:07 PM    Creatinine 1.2 08/01/2017 04:07 PM    Microalbumin/Creat ratio (mg/g creat) 5 01/16/2017 03:24 PM    Microalbumin,urine random 1.90 01/16/2017 03:24 PM    Urine Microalbumin, External normal (<12 nml is < 17). [facility] Herrera at [facility] 12/04/2017    Cholesterol, total 124 01/16/2017 03:24 PM    HDL Cholesterol 51 01/16/2017 03:24 PM    LDL, calculated 45.4 01/16/2017 03:24 PM    LDL-C, External 37 12/04/2017    Triglyceride 138 01/16/2017 03:24 PM     Diabetic Foot and Eye Exam HM Status   Topic Date Due    Diabetic Foot Care  01/16/2018    Eye Exam  01/31/2018

## 2017-12-28 NOTE — PROGRESS NOTES
HISTORY OF PRESENT ILLNESS  Rodriguez Leija is a 65 y.o. female. Visit Vitals    BP 92/68 (BP 1 Location: Left arm, BP Patient Position: Sitting)    Pulse 96    Temp 98 °F (36.7 °C) (Oral)    Resp 16    Ht 5' 7\" (1.702 m)    Wt 247 lb (112 kg)    SpO2 98%    BMI 38.69 kg/m2       HPI Comments: neck and headache sxs have essentially resolved. Has some muslce relaxers and time seems to have done the job. Neck Pain   The history is provided by the patient (see comments). This is a chronic problem. The current episode started more than 1 week ago. The problem occurs rarely. The problem has been resolved. Associated symptoms include headaches. Headache   The history is provided by the patient (see comments). This is a chronic problem. The current episode started more than 1 week ago. The problem occurs every several days. The problem has been resolved. Associated symptoms include headaches. Exacerbated by: activity. Review of Systems   Musculoskeletal: Positive for neck pain. Neurological: Positive for headaches. Physical Exam   Constitutional: She is oriented to person, place, and time. She appears well-developed and well-nourished. No distress. Cardiovascular: Normal rate and regular rhythm. Pulmonary/Chest: Effort normal and breath sounds normal.   Musculoskeletal: She exhibits no edema. Good upper body strength. Good ROM of neck. Some tightness along trapezius. But overall negative findings   Neurological: She is alert and oriented to person, place, and time.    Diabetic foot exam:     Left: Reflexes 2+     Vibratory sensation diminished    Proprioception normal   Sharp/dull discrimination     Filament test normal sensation with micro filament   Pulse DP: 2+ (normal)   Pulse PT:    Deformities: Mild - mild toe deformities, flat arch    Right: Reflexes 2+   Vibratory sensation diminished   Proprioception normal   Sharp/dull discrimination    Filament test normal sensation with micro filament   Pulse DP: 2+ (normal)   Pulse PT:    Deformities: Mild - toe deformities, flat arch     Skin: Skin is warm and dry. She is not diaphoretic. Psychiatric: She has a normal mood and affect. Nursing note and vitals reviewed. ASSESSMENT and PLAN    ICD-10-CM ICD-9-CM    1. Neck pain M54.2 723.1    2. Muscle spasm M62.838 728.85    3. Type 2 diabetes mellitus without complication, without long-term current use of insulin ([facility]) E11.9 250.00  DIABETES FOOT EXAM   4. Type 2 diabetes mellitus with nephropathy ([facility]) E11.21 250.40      583.81    5. Nasal congestion R09.81 478.19 chlorpheniramine-HYDROcodone (TUSSIONEX) 10-8 mg/5 mL suspension   6. Medication refill Z76.0 V68.1 chlorpheniramine-HYDROcodone (TUSSIONEX) 10-8 mg/5 mL suspension       xrays of neck were great  CT brain did not find anything of concern either.    Both were essentiallly normal    F/u here 3-4 months

## 2017-12-28 NOTE — ASSESSMENT & PLAN NOTE
This condition is managed by Specialist.  Key Antihyperglycemic Medications             TRULICITY 2.04 MQ/4.0 mL sub-q pen  (Taking)     metFORMIN ER (GLUCOPHAGE XR) 500 mg tablet     metFORMIN (GLUMETZA ER) 500 mg TG24 24 hour tablet Take  by mouth. 2 pills every evening    dulaglutide (TRULICITY) 1.5 IY/0.5 mL sub-q pen 1.5 mg by SubCUTAneous route every seven (7) days. Other Key Diabetic Medications             enalapril (VASOTEC) 20 mg tablet  (Taking) take 1 tablet by mouth once daily    clonazepam (KLONOPIN) 0.5 mg tablet  (Taking) Take  by mouth two (2) times a day. rosuvastatin (CRESTOR) 40 mg tablet  (Taking) Take 40 mg by mouth daily. NON FORMULARY     fenofibrate micronized (LOFIBRA) 134 mg capsule  (Taking) Take  by mouth every morning. Lab Results   Component Value Date/Time    Hemoglobin A1c 6.5 08/01/2017 04:07 PM    Hemoglobin A1c, External 6.3 05/31/2017    Glucose 76 08/01/2017 04:07 PM    Creatinine 1.2 08/01/2017 04:07 PM    Microalbumin/Creat ratio (mg/g creat) 5 01/16/2017 03:24 PM    Microalbumin,urine random 1.90 01/16/2017 03:24 PM    Urine Microalbumin, External normal (<12 nml is < 17). Patsy Rebolledo at [facility] 12/04/2017    Cholesterol, total 124 01/16/2017 03:24 PM    HDL Cholesterol 51 01/16/2017 03:24 PM    LDL, calculated 45.4 01/16/2017 03:24 PM    LDL-C, External 37 12/04/2017    Triglyceride 138 01/16/2017 03:24 PM     Diabetic Foot and Eye Exam HM Status   Topic Date Due    Diabetic Foot Care  01/16/2018    Eye Exam  01/31/2018

## 2018-01-01 NOTE — MR AVS SNAPSHOT
Problem: Patient Care Overview  Goal: Plan of Care Review  Outcome: Ongoing (interventions implemented as appropriate)  Infant remains in double walled isolette on manual mode with stable temps and vital signs.  Remains in room air with no episodes of apnea or bradycardia. Tolerating feeds of EBM 24kcal with no spits or residuals - nippeling fair x2 this shift. Voiding and stooling. Mom called x 1 - updated on plan of care.       Visit Information Date & Time Provider Department Dept. Phone Encounter #  
 12/28/2017  1:30 PM Wing Eldridge, [address] [id number] Follow-up Instructions Return in about 14 weeks (around 4/5/2018) for HTN, DM. Your Appointments 1/9/2018 10:10 AM  
Nurse Visit with Desi NUGENT POD4 NURSE [facility]. Madi Grimm [address] ([facility]) Appt Note: C&S Botox scheduled for 1/22 with  [address] [phone] [id number]  
  
    
 1/22/2018  1:30 PM  
PROCEDURE with Kiah Upton MD  
[facility]. Madi Silverio[address]enueva [address] [facility]) Appt Note: Botox 200 Units 301 [address] [address]  
[phone]  
  
   
 [place] [id number] Upcoming Health Maintenance Date Due  
 PAP AKA CERVICAL CYTOLOGY 9/25/2017 FOOT EXAM Q1 1/16/2018 MICROALBUMIN Q1 1/16/2018 Pneumococcal 19-64 Medium Risk (1 of 1 - PPSV23) 1/14/2018* EYE EXAM RETINAL OR DILATED Q1 1/31/2018 HEMOGLOBIN A1C Q6M 2/1/2018 LIPID PANEL Q1 5/17/2018 DTaP/Tdap/Td series (2 - Td) 11/1/2022 COLONOSCOPY 4/1/2024 *Topic was postponed. The date shown is not the original due date. Allergies as of 12/28/2017  Review Complete On: 12/28/2017 By: Wing Eldridge MD  
  
 Severity Noted Reaction Type Reactions Digoxin  11/24/2014   Side Effect Nausea Only Niaspan [Niacin]  10/31/2011   Side Effect Rash Wellbutrin [Bupropion Hcl]  11/24/2014   Side Effect Itching Current Immunizations  Reviewed on 9/27/2013 Name Date Influenza Vaccine 9/27/2013  3:46 PM  
 Influenza Vaccine (Quad) PF 10/17/2017, 10/13/2016, 12/17/2015  2:37 PM  
 Influenza Vaccine PF 10/20/2014 Influenza Vaccine Split 11/1/2012  9:46 AM  
 Pneumococcal Conjugate (PCV-13) 12/12/2016  TDAP Vaccine 11/1/2012  9:45 AM  
 Not reviewed this visit You Were Diagnosed With   
  
 Codes Comments Neck pain    -  Primary ICD-10-CM: M54.2 ICD-9-CM: 723.1 Muscle spasm     ICD-10-CM: V00.504 ICD-9-CM: 728.85 Type 2 diabetes mellitus without complication, without long-term current use of insulin (HCC)     ICD-10-CM: E11.9 ICD-9-CM: 250.00 Type 2 diabetes mellitus with nephropathy (HCC)     ICD-10-CM: E11.21 
ICD-9-CM: 250.40, 583.81 Nasal congestion     ICD-10-CM: R09.81 ICD-9-CM: 478.19 Medication refill     ICD-10-CM: Z76.0 ICD-9-CM: V68.1 Vitals BP Pulse Temp Resp Height(growth percentile) Weight(growth percentile) 92/68 (BP 1 Location: Left arm, BP Patient Position: Sitting) 96 98 °F (36.7 °C) (Oral) 16 5' 7\" (1.702 m) 247 lb (112 kg) SpO2 BMI OB Status Smoking Status 98% 38.69 kg/m2 Postmenopausal Never Smoker Vitals History BMI and BSA Data Body Mass Index Body Surface Area  
 38.69 kg/m 2 2.3 m 2 Preferred Pharmacy Pharmacy Name Phone [facility] [address]  [facility] [phone] Your Updated Medication List  
  
   
This list is accurate as of: 12/28/17  2:24 PM.  Always use your most recent med list.  
  
  
  
  
 alendronate 70 mg tablet Commonly known as:  FOSAMAX Take 1 Tab by mouth every seven (7) days. aspirin 81 mg chewable tablet Take 81 mg by mouth daily. BD INSULIN PEN NEEDLE UF MINI 31 gauge x 3/16\" Ndle Generic drug:  Insulin Needles (Disposable) CALCIUM 600 + D(3) PO Take 2 Caps by mouth daily. carvedilol 6.25 mg tablet Commonly known as:  Gillermina Begun Take 6.25 mg by mouth two (2) times daily (with meals). Take 2 tablets in the morning and two at night till gone  
  
 cephALEXin 500 mg capsule Commonly known as:  KEFLEX  
  
 chlorpheniramine-HYDROcodone 10-8 mg/5 mL suspension Commonly known as:  Danielle Bowden Take 5 mL by mouth every twelve (12) hours as needed for Cough. Max Daily Amount: 10 mL. clonazePAM 0.5 mg tablet Commonly known as:  Elta Halsted Take  by mouth two (2) times a day. COQ10  100-100 mg-unit Cap Generic drug:  coenzyme q10-vitamin e Take  by mouth two (2) times a day. CRESTOR 40 mg tablet Generic drug:  rosuvastatin Take 40 mg by mouth daily. NON FORMULARY DULoxetine 60 mg capsule Commonly known as:  CYMBALTA  
120 mg daily. enalapril 20 mg tablet Commonly known as:  VASOTEC  
take 1 tablet by mouth once daily  
  
 estradiol 0.01 % (0.1 mg/gram) vaginal cream  
Commonly known as:  ESTRACE Insert 2 g into vagina daily. Apply fingertip amount to outside of vaginal opening.  
  
 hydrOXYzine HCl 50 mg tablet Commonly known as:  ATARAX  
take 1 tablet by mouth every 12 hours LOFIBRA 134 mg capsule Generic drug:  fenofibrate micronized Take  by mouth every morning. loratadine 10 mg Cap Take  by mouth.  
  
 lubiPROStone 8 mcg capsule Commonly known as:  Jen Muckle Take 2 Caps by mouth two (2) times daily (with meals). Indications: chronic idiopathic constipation * metFORMIN 500 mg Tg24 24 hour tablet Commonly known asBillee Spruce ER Take  by mouth. 2 pills every evening * metFORMIN  mg tablet Commonly known as:  GLUCOPHAGE XR  
  
 MIRALAX 17 gram/dose powder Generic drug:  polyethylene glycol Take 17 g by mouth daily. multivitamin tablet Commonly known as:  ONE A DAY Take 1 Tab by mouth daily. nystatin powder Commonly known as:  MYCOSTATIN Apply  to affected area as needed. omeprazole 20 mg capsule Commonly known as:  PRILOSEC  
two (2) times a day. spironolactone 25 mg tablet Commonly known as:  ALDACTONE Take  by mouth daily. SUMAtriptan succinate 6 mg/0.5 mL kit Commonly known as:  Rony Raspberry PEN  
 6 mg by SubCUTAneous route once as needed for Migraine. tiZANidine 4 mg tablet Commonly known as:  Ian Neal Take 1 Tab by mouth two (2) times daily as needed. Indications: Muscle Spasm  
  
 triamcinolone acetonide 0.025 % topical cream  
Commonly known as:  KENALOG Apply  to affected area two (2) times a day. use thin layer * TRULICITY 1.5 HD/5.6 mL sub-q pen Generic drug:  dulaglutide 1.5 mg by SubCUTAneous route every seven (7) days. * TRULICITY 5.56 YM/0.4 mL sub-q pen Generic drug:  dulaglutide * Notice: This list has 4 medication(s) that are the same as other medications prescribed for you. Read the directions carefully, and ask your doctor or other care provider to review them with you. Prescriptions Printed Refills  
 chlorpheniramine-HYDROcodone (TUSSIONEX) 10-8 mg/5 mL suspension o Sig: Take 5 mL by mouth every twelve (12) hours as needed for Cough. Max Daily Amount: 10 mL. Class: Print Route: Oral  
  
We Performed the Following AMB EXT LDL-C [PUL35614 CPT(R)] Comments: This external order was created through the Results Console. AMB EXT URINE MICROALBUMIN [[id number] CPT(R)] Comments: This external order was created through the Results Console.  DIABETES FOOT EXAM [HM7 Custom] Follow-up Instructions Return in about 14 weeks (around 4/5/2018) for HTN, DM. Introducing [facility]! Dear Frida Jordan: Thank you for requesting a [employer] account. Our records indicate that you already have an active [employer] account. You can access your account anytime at [email or web address] Did you know that you can access your hospital and ER discharge instructions at any time in [employer]? You can also review all of your test results from your hospital stay or ER visit. Additional Information If you have questions, please visit the Frequently Asked Questions section of the [employer] website at [email or web address] Remember, [employer] is NOT to be used for urgent needs. For medical emergencies, dial 911. Now available from your iPhone and Android! Please provide this summary of care documentation to your next provider. Your primary care clinician is listed as [facility]. If you have any questions after today's visit, please call [phone].

## 2018-02-07 ENCOUNTER — OFFICE VISIT (OUTPATIENT)
Dept: INTERNAL MEDICINE CLINIC | Age: 58
End: 2018-02-07

## 2018-02-07 VITALS
TEMPERATURE: 97.5 F | HEIGHT: 67 IN | DIASTOLIC BLOOD PRESSURE: 86 MMHG | SYSTOLIC BLOOD PRESSURE: 139 MMHG | WEIGHT: 245 LBS | BODY MASS INDEX: 38.45 KG/M2 | RESPIRATION RATE: 17 BRPM | OXYGEN SATURATION: 90 % | HEART RATE: 99 BPM

## 2018-02-07 DIAGNOSIS — R52 BODY ACHES: ICD-10-CM

## 2018-02-07 DIAGNOSIS — R09.81 SINUS CONGESTION: Primary | ICD-10-CM

## 2018-02-07 DIAGNOSIS — J01.10 SUBACUTE FRONTAL SINUSITIS: ICD-10-CM

## 2018-02-07 DIAGNOSIS — G44.89 OTHER HEADACHE SYNDROME: ICD-10-CM

## 2018-02-07 LAB
QUICKVUE INFLUENZA TEST: NEGATIVE
VALID INTERNAL CONTROL?: YES

## 2018-02-07 RX ORDER — AMOXICILLIN 875 MG/1
875 TABLET, FILM COATED ORAL 2 TIMES DAILY
Qty: 20 TAB | Refills: 0 | Status: SHIPPED | OUTPATIENT
Start: 2018-02-07 | End: 2018-04-05 | Stop reason: ALTCHOICE

## 2018-02-07 RX ORDER — CETIRIZINE HYDROCHLORIDE, PSEUDOEPHEDRINE HYDROCHLORIDE 5; 120 MG/1; MG/1
1 TABLET, FILM COATED, EXTENDED RELEASE ORAL 2 TIMES DAILY
Qty: 30 TAB | Refills: 0 | Status: SHIPPED | OUTPATIENT
Start: 2018-02-07 | End: 2018-09-25

## 2018-02-07 NOTE — MR AVS SNAPSHOT
[facility] 
 
 
 [address] [place] [address] 
[phone] Patient: Michaela Recio MRN: [id number] AV Visit Information Date & Time Provider Department Dept. Phone Encounter #  
 2018 11:30 AM [address] [phone] [id number] Follow-up Instructions Return if symptoms worsen or fail to improve. Your Appointments 2018 11:00 AM  
Office Visit with MD [address] [facility]) Appt Note: 14 week f/u HTN/DM  
 [address] [address] [address]  
  
    
 2018 10:00 AM  
ESTABLISHED PATIENT with Corwin Bellamy MD  
[facility]) Appt Note: RTC 4 months for reevaluation; sooner if needed. [address] [facility] [address]  
[phone]  
  
   
 [place] [address] Upcoming Health Maintenance Date Due Pneumococcal 19-64 Medium Risk (1 of 1 - PPSV23) 1980 PAP AKA CERVICAL CYTOLOGY 2017 EYE EXAM RETINAL OR DILATED Q1 2018 HEMOGLOBIN A1C Q6M 2018 MICROALBUMIN Q1 2018 LIPID PANEL Q1 2018 FOOT EXAM Q1 2018 BREAST CANCER SCRN MAMMOGRAM 2019 DTaP/Tdap/Td series (2 - Td) 2022 COLONOSCOPY 2024 Allergies as of 2018  Review Complete On: 2018 By: Long Greer Severity Noted Reaction Type Reactions Digoxin  2014   Side Effect Nausea Only Niaspan [Niacin]  10/31/2011   Side Effect Rash Wellbutrin [Bupropion Hcl]  2014   Side Effect Itching Current Immunizations  Reviewed on 2013 Name Date  Influenza Vaccine 2013  3:46 PM  
 Influenza Vaccine (Quad) PF 10/17/2017, 10/13/2016, 2015  2:37 PM  
 Influenza Vaccine PF 10/20/2014 Influenza Vaccine Split 11/1/2012  9:46 AM  
 Pneumococcal Conjugate (PCV-13) 12/12/2016 TDAP Vaccine 11/1/2012  9:45 AM  
  
 Not reviewed this visit You Were Diagnosed With   
  
 Codes Comments Sinus congestion    -  Primary ICD-10-CM: R09.81 ICD-9-CM: 478.19 Body aches     ICD-10-CM: R52 ICD-9-CM: 780.96 Other headache syndrome     ICD-10-CM: G44.89 ICD-9-CM: 339.89 Subacute frontal sinusitis     ICD-10-CM: J01.10 ICD-9-CM: 968.5 Vitals BP Pulse Temp Resp Height(growth percentile) Weight(growth percentile) 139/86 (BP 1 Location: Right arm, BP Patient Position: Sitting) 99 97.5 °F (36.4 °C) (Oral) 17 5' 7\" (1.702 m) 245 lb (111.1 kg) SpO2 BMI OB Status Smoking Status 90% 38.37 kg/m2 Postmenopausal Never Smoker Vitals History BMI and BSA Data Body Mass Index Body Surface Area  
 38.37 kg/m 2 2.29 m 2 Preferred Pharmacy Pharmacy Name Phone [facility] [address]  [facility] [phone] Your Updated Medication List  
  
   
This list is accurate as of: 2/7/18 12:41 PM.  Always use your most recent med list.  
  
  
  
  
 amoxicillin 875 mg tablet Commonly known as:  AMOXIL Take 1 Tab by mouth two (2) times a day. aspirin 81 mg chewable tablet Take 81 mg by mouth daily. BD INSULIN PEN NEEDLE UF MINI 31 gauge x 3/16\" Ndle Generic drug:  Insulin Needles (Disposable) CALCIUM 600 + D(3) PO Take 2 Caps by mouth daily. carvedilol 12.5 mg tablet Commonly known as:  COREG  
take 1 tablet by mouth twice a day  
  
 cetirizine-psuedoePHEDrine 5-120 mg per tablet Commonly known as:  ZyrTEC-D Take 1 Tab by mouth two (2) times a day. clonazePAM 0.5 mg tablet Commonly known as:  Dasilva Door Take  by mouth two (2) times a day. COQ10  100-100 mg-unit Cap Generic drug:  coenzyme q10-vitamin e  
 Take  by mouth two (2) times a day. CRESTOR 40 mg tablet Generic drug:  rosuvastatin Take 40 mg by mouth daily. NON FORMULARY DULoxetine 60 mg capsule Commonly known as:  CYMBALTA  
120 mg daily. enalapril 20 mg tablet Commonly known as:  VASOTEC  
take 1 tablet by mouth once daily  
  
 estradiol 0.01 % (0.1 mg/gram) vaginal cream  
Commonly known as:  ESTRACE Insert 2 g into vagina daily. Apply fingertip amount to outside of vaginal opening. HYDROcodone-acetaminophen 5-300 mg tablet Commonly known as:  Zada [place]  
take 1 tablet by mouth every 4 to 6 hours if needed  
  
 hydrOXYzine HCl 50 mg tablet Commonly known as:  ATARAX  
take 1 tablet by mouth every 12 hours LOFIBRA 134 mg capsule Generic drug:  fenofibrate micronized Take  by mouth every morning. loratadine 10 mg Cap Take  by mouth.  
  
 lubiPROStone 8 mcg capsule Commonly known as:  Normajean Ishihara Take 2 Caps by mouth two (2) times daily (with meals). Indications: chronic idiopathic constipation  
  
 metFORMIN  mg tablet Commonly known as:  GLUCOPHAGE XR  
  
 MIRALAX 17 gram/dose powder Generic drug:  polyethylene glycol Take 17 g by mouth daily. multivitamin tablet Commonly known as:  ONE A DAY Take 1 Tab by mouth daily. nystatin powder Commonly known as:  MYCOSTATIN Apply  to affected area as needed. omeprazole 20 mg capsule Commonly known as:  PRILOSEC  
two (2) times a day. spironolactone 25 mg tablet Commonly known as:  ALDACTONE Take  by mouth daily. SUMAtriptan succinate 6 mg/0.5 mL kit Commonly known as:  IMITREX STATDOSE PEN  
6 mg by SubCUTAneous route once as needed for Migraine. tiZANidine 4 mg tablet Commonly known as:  Alveda Riis Take 1 Tab by mouth two (2) times daily as needed. Indications: Muscle Spasm  
  
 triamcinolone acetonide 0.025 % topical cream  
Commonly known as:  KENALOG Apply  to affected area two (2) times a day. use thin layer  
  
 trimethoprim-sulfamethoxazole 160-800 mg per tablet Commonly known as:  BACTRIM DS, SEPTRA DS Take 1 Tab by mouth two (2) times a day. TRULICITY 1.5 NZ/0.8 mL sub-q pen Generic drug:  dulaglutide 1.5 mg by SubCUTAneous route every seven (7) days. Prescriptions Sent to Pharmacy Refills  
 cetirizine-psuedoePHEDrine (ZYRTEC-D) 5-120 mg per tablet 0 Sig: Take 1 Tab by mouth two (2) times a day. Class: Normal  
 Pharmacy: [address] Ph #: [phone] Route: Oral  
 amoxicillin (AMOXIL) 875 mg tablet 0 Sig: Take 1 Tab by mouth two (2) times a day. Class: Normal  
 Pharmacy: [address] Ph #: [phone] Route: Oral  
  
We Performed the Following AMB POC RAPID INFLUENZA TEST [46010 CPT(R)] Follow-up Instructions Return if symptoms worsen or fail to improve. Patient Instructions Headache: Care Instructions Your Care Instructions Headaches have many possible causes. Most headaches aren't a sign of a more serious problem, and they will get better on their own. Home treatment may help you feel better faster. The doctor has checked you carefully, but problems can develop later. If you notice any problems or new symptoms, get medical treatment right away. Follow-up care is a key part of your treatment and safety. Be sure to make and go to all appointments, and call your doctor if you are having problems. It's also a good idea to know your test results and keep a list of the medicines you take. How can you care for yourself at home? · Do not drive if you have taken a prescription pain medicine. · Rest in a quiet, dark room until your headache is gone. Close your eyes and try to relax or go to sleep. Don't watch TV or read. · Put a cold, moist cloth or cold pack on the painful area for 10 to 20 minutes at a time. Put a thin cloth between the cold pack and your skin. · Use a warm, moist towel or a heating pad set on low to relax tight shoulder and neck muscles. · Have someone gently massage your neck and shoulders. · Take pain medicines exactly as directed. ¨ If the doctor gave you a prescription medicine for pain, take it as prescribed. ¨ If you are not taking a prescription pain medicine, ask your doctor if you can take an over-the-counter medicine. · Be careful not to take pain medicine more often than the instructions allow, because you may get worse or more frequent headaches when the medicine wears off. · Do not ignore new symptoms that occur with a headache, such as a fever, weakness or numbness, vision changes, or confusion. These may be signs of a more serious problem. To prevent headaches · Keep a headache diary so you can figure out what triggers your headaches. Avoiding triggers may help you prevent headaches. Record when each headache began, how long it lasted, and what the pain was like (throbbing, aching, stabbing, or dull). Write down any other symptoms you had with the headache, such as nausea, flashing lights or dark spots, or sensitivity to bright light or loud noise. Note if the headache occurred near your period. List anything that might have triggered the headache, such as certain foods (chocolate, cheese, wine) or odors, smoke, bright light, stress, or lack of sleep. · Find healthy ways to deal with stress. Headaches are most common during or right after stressful times. Take time to relax before and after you do something that has caused a headache in the past. 
· Try to keep your muscles relaxed by keeping good posture. Check your jaw, face, neck, and shoulder muscles for tension, and try relaxing them. When sitting at a desk, change positions often, and stretch for 30 seconds each hour. · Get plenty of sleep and exercise. · Eat regularly and well. Long periods without food can trigger a headache. · Treat yourself to a massage. Some people find that regular massages are very helpful in relieving tension. · Limit caffeine by not drinking too much coffee, tea, or soda. But don't quit caffeine suddenly, because that can also give you headaches. · Reduce eyestrain from computers by blinking frequently and looking away from the computer screen every so often. Make sure you have proper eyewear and that your monitor is set up properly, about an arm's length away. · Seek help if you have depression or anxiety. Your headaches may be linked to these conditions. Treatment can both prevent headaches and help with symptoms of anxiety or depression. When should you call for help? Call 911 anytime you think you may need emergency care. For example, call if: 
? · You have signs of a stroke. These may include: 
¨ Sudden numbness, paralysis, or weakness in your face, arm, or leg, especially on only one side of your body. ¨ Sudden vision changes. ¨ Sudden trouble speaking. ¨ Sudden confusion or trouble understanding simple statements. ¨ Sudden problems with walking or balance. ¨ A sudden, severe headache that is different from past headaches. ?Call your doctor now or seek immediate medical care if: 
? · You have a new or worse headache. ? · Your headache gets much worse. Where can you learn more? Go to http://rm-shannon.info/. Enter M271 in the search box to learn more about \"Headache: Care Instructions. \" Current as of: October 14, 2016 Content Version: 11.4 © 6251-0296 [employer]. Care instructions adapted under license by [employer] (which disclaims liability or warranty for this information).  If you have questions about a medical condition or this instruction, always ask your healthcare professional. Alexandra Simon, [facility] disclaims any warranty or liability for your use of this information. Signs of Pain in a Child: Care Instructions Your Care Instructions Pain can be hard for a child to describe. An older child may be able to describe how the pain feels or tell you whether the pain comes and goes. A toddler may complain of pain or tell you that he or she is not feeling well. But the signs of pain in an infant can sometimes be hard to recognize. A persistent cry in a  may be the first sign of a serious illness. A child with a serious illness or problem, such as an ear infection, usually cries longer than normal. 
Follow-up care is a key part of your child's treatment and safety. Be sure to make and go to all appointments, and call your doctor if your child is having problems. It's also a good idea to know your child's test results and keep a list of the medicines your child takes. How can you care for your child at home? Watch for these signs of pain · The signs listed below may help you decide whether your child's pain is mild, moderate, or severe. A child with severe pain will have more of these behaviors and may be harder to comfort. Look for: 
¨ Changes in usual behavior. Your child may eat less or become fussy or restless. ¨ Crying that can't be comforted. ¨ Crying, grunting, or breath-holding. ¨ Facial expressions, such as a furrowed brow, a wrinkled forehead, closed eyes, or an angry appearance. ¨ Sleep changes, such as waking often or sleeping more or less than usual. Even children in severe pain may take short naps because they are so tired. ¨ Body movements, such as making fists, protecting a part of the body (especially while walking), kicking, clinging to whoever holds him or her, or not moving. · Also look for signs of injury or illness, including: ¨ Swelling, bruises, or bleeding. ¨ Fever, vomiting, diarrhea, or crying during feeding.  Also check for an open pin sticking the skin; a red spot that may be an insect bite; or a strand of hair wrapped around a finger, a toe, or a boy's penis. To treat mild or moderate pain · Give your child acetaminophen (Tylenol) or ibuprofen (Advil, Motrin) for pain. Read and follow all instructions on the label. For children younger than 10months of age, follow your doctor's instructions about how much to give your child. · Do not give aspirin to anyone younger than 20. It has been linked to Reye syndrome, a serious illness. · Be careful when giving your child over-the-counter cold or flu medicines and Tylenol at the same time. Many of these medicines have acetaminophen, which is Tylenol. Read the labels to make sure that you are not giving your child more than the recommended dose. Too much acetaminophen (Tylenol) can be harmful. When should you call for help? Call 911 anytime you think your child may need emergency care. For example, call if: 
? · You feel that your baby is extremely sick. A sick baby: ¨ May be limp and floppy like a rag doll. ¨ May not respond at all to being held, touched, or talked to. ¨ May be hard to wake up. ?Call your doctor now or seek immediate medical care if: 
? · Your child is in pain and you do not know why.  
? · You believe your child is in severe pain. ? · Your child is very fussy and cannot be comforted. ? · Your child does not seem better after taking medicine for pain. ? Watch closely for changes in your child's health, and be sure to contact your doctor if: 
? · Your child does not get better as expected. Where can you learn more? Go to http://rm-shannon.info/. Enter  in the search box to learn more about \"Signs of Pain in a Child: Care Instructions. \" Current as of: October 14, 2016 Content Version: 11.4 © 8339-7546 [employer].  Care instructions adapted under license by [employer] (which disclaims liability or warranty for this information). If you have questions about a medical condition or this instruction, always ask your healthcare professional. [address] any warranty or liability for your use of this information. Sinusitis: Care Instructions Your Care Instructions Sinusitis is an infection of the lining of the sinus cavities in your head. Sinusitis often follows a cold. It causes pain and pressure in your head and face. In most cases, sinusitis gets better on its own in 1 to 2 weeks. But some mild symptoms may last for several weeks. Sometimes antibiotics are needed. Follow-up care is a key part of your treatment and safety. Be sure to make and go to all appointments, and call your doctor if you are having problems. It's also a good idea to know your test results and keep a list of the medicines you take. How can you care for yourself at home? · Take an over-the-counter pain medicine, such as acetaminophen (Tylenol), ibuprofen (Advil, Motrin), or naproxen (Aleve). Read and follow all instructions on the label. · If the doctor prescribed antibiotics, take them as directed. Do not stop taking them just because you feel better. You need to take the full course of antibiotics. · Be careful when taking over-the-counter cold or flu medicines and Tylenol at the same time. Many of these medicines have acetaminophen, which is Tylenol. Read the labels to make sure that you are not taking more than the recommended dose. Too much acetaminophen (Tylenol) can be harmful. · Breathe warm, moist air from a steamy shower, a hot bath, or a sink filled with hot water. Avoid cold, dry air. Using a humidifier in your home may help. Follow the directions for cleaning the machine. · Use saline (saltwater) nasal washes to help keep your nasal passages open and wash out mucus and bacteria. You can buy saline nose drops at a grocery store or drugstore.  Or you can make your own at home by adding 1 teaspoon of salt and 1 teaspoon of baking soda to 2 cups of distilled water. If you make your own, fill a bulb syringe with the solution, insert the tip into your nostril, and squeeze gently. Carrie Ruff your nose. · Put a hot, wet towel or a warm gel pack on your face 3 or 4 times a day for 5 to 10 minutes each time. · Try a decongestant nasal spray like oxymetazoline (Afrin). Do not use it for more than 3 days in a row. Using it for more than 3 days can make your congestion worse. When should you call for help? Call your doctor now or seek immediate medical care if: 
? · You have new or worse swelling or redness in your face or around your eyes. ? · You have a new or higher fever. ? Watch closely for changes in your health, and be sure to contact your doctor if: 
? · You have new or worse facial pain. ? · The mucus from your nose becomes thicker (like pus) or has new blood in it. ? · You are not getting better as expected. Where can you learn more? Go to http://rm-shannon.info/. Enter G834 in the search box to learn more about \"Sinusitis: Care Instructions. \" Current as of: May 12, 2017 Content Version: 11.4 © 2618-8461 [employer]. Care instructions adapted under license by [employer] (which disclaims liability or warranty for this information). If you have questions about a medical condition or this instruction, always ask your healthcare professional. [place] [address] any warranty or liability for your use of this information. Introducing [facility]! Dear Monica Montoya: Thank you for requesting a Applifier account. Our records indicate that you already have an active Applifier account. You can access your account anytime at [email or web address] Did you know that you can access your hospital and ER discharge instructions at any time in Applifier?   You can also review all of your test results from your hospital stay or ER visit. Additional Information If you have questions, please visit the Frequently Asked Questions section of the GLSS website at [email or web address] Remember, GLSS is NOT to be used for urgent needs. For medical emergencies, dial 911. Now available from your iPhone and Android! Please provide this summary of care documentation to your next provider. Your primary care clinician is listed as [facility]. If you have any questions after today's visit, please call [phone].

## 2018-02-07 NOTE — PROGRESS NOTES
Patient of Dr Addison Natarajan with posterior HA radiating to top of head, left ear pain 5/10, sinus pressure, night sweats, nasal drainage, body aches x 1 week. Patient denies any abd pain, dizziness,SOb,CP. Chief Complaint   Patient presents with    Headache    Ear Pain     left x 3 days     Nausea       HPI:     Juliet Hartley is a 65 y.o.  female with history of   here for the above complaint. Past Medical History:   Diagnosis Date    Abnormal bone density screening 3/10/2011    Anemia 1996    Baker's cyst     left    CTS (carpal tunnel syndrome)     bilat    Depression     Diabetes mellitus     Dysfunctional voiding of urine     Fibromyalgia     GERD (gastroesophageal reflux disease)     H/O bone density study 2017    osteopenia,   SEE MEDIA    History of meniscal tear     Metacarpal bone fracture     left 5th    Migraines     OAB (overactive bladder)     documented on UDS     Obesity     Osteopenia 2006    Plantar fasciitis 1996    Pre-syncope 16    Sleep apnea     Type II or unspecified type diabetes mellitus with neurological manifestations, uncontrolled(250.62) (HCC)     Unspecified hereditary and idiopathic peripheral neuropathy     Unspecified urinary incontinence     Urge incontinence     Urinary tract infection, site not specified     Vitamin D deficiency      Past Surgical History:   Procedure Laterality Date    CARDIAC CATHETERIZATION  2006    nml coronary arteries    COLONOSCOPY      5 yr f/u, Dr. Tina Goldman    EGD      HX ARTHROTOMY  1/15    left shoulder, with decompression.  Dr. Dominick Machado  2016    HX  SECTION       [address]    HX CHOLECYSTECTOMY      HX COLONOSCOPY      HX COLONOSCOPY  2014    HX ENDOSCOPY      ENDOSCOPY,COLON, DIAGNOSTIC    HX KNEE ARTHROSCOPY      HX OTHER SURGICAL  2015    SHOULDER REPAIR  HX OTHER SURGICAL  01/09/2015    SHOULDER REPAIR    WRIST ARTHROSCOP,RELEASE XVERS LIG  12/23/15    Carpal Tunnel Release    WRIST ARTHROSCOP,RELEASE XVERS LIG  1/12/16    Carpal Tunnel release     Current Outpatient Prescriptions   Medication Sig    cetirizine-psuedoePHEDrine (ZYRTEC-D) 5-120 mg per tablet Take 1 Tab by mouth two (2) times a day.  amoxicillin (AMOXIL) 875 mg tablet Take 1 Tab by mouth two (2) times a day.  HYDROcodone-acetaminophen (XODOL) 5-300 mg tablet take 1 tablet by mouth every 4 to 6 hours if needed    carvedilol (COREG) 12.5 mg tablet take 1 tablet by mouth twice a day    triamcinolone acetonide (KENALOG) 0.025 % topical cream Apply  to affected area two (2) times a day. use thin layer    tiZANidine (ZANAFLEX) 4 mg tablet Take 1 Tab by mouth two (2) times daily as needed. Indications: Muscle Spasm    enalapril (VASOTEC) 20 mg tablet take 1 tablet by mouth once daily    lubiPROStone (AMITIZA) 8 mcg capsule Take 2 Caps by mouth two (2) times daily (with meals). Indications: chronic idiopathic constipation    dulaglutide (TRULICITY) 1.5 LF/8.3 mL sub-q pen 1.5 mg by SubCUTAneous route every seven (7) days.  nystatin (MYCOSTATIN) powder Apply  to affected area as needed.  estradiol (ESTRACE) 0.01 % (0.1 mg/gram) vaginal cream Insert 2 g into vagina daily. Apply fingertip amount to outside of vaginal opening.  loratadine 10 mg cap Take  by mouth.  SUMAtriptan succinate (IMITREX STATDOSE PEN) 6 mg/0.5 mL kit 6 mg by SubCUTAneous route once as needed for Migraine.  DULoxetine (CYMBALTA) 60 mg capsule 120 mg daily.  omeprazole (PRILOSEC) 20 mg capsule two (2) times a day.  multivitamin (ONE A DAY) tablet Take 1 Tab by mouth daily.  BD INSULIN PEN NEEDLE UF MINI 31 x 3/16 \" ndle     spironolactone (ALDACTONE) 25 mg tablet Take  by mouth daily.  coenzyme q10-vitamin e (COQ10 ) 100-100 mg-unit cap Take  by mouth two (2) times a day.     clonazepam (KLONOPIN) 0.5 mg tablet Take  by mouth two (2) times a day.  rosuvastatin (CRESTOR) 40 mg tablet Take 40 mg by mouth daily. NON FORMULARY     fenofibrate micronized (LOFIBRA) 134 mg capsule Take  by mouth every morning.  polyethylene glycol (MIRALAX) 17 gram/dose powder Take 17 g by mouth daily.  aspirin 81 mg chewable tablet Take 81 mg by mouth daily.  CALCIUM CARBONATE/VITAMIN D3 (CALCIUM 600 + D,3, PO) Take 2 Caps by mouth daily.  trimethoprim-sulfamethoxazole (BACTRIM DS, SEPTRA DS) 160-800 mg per tablet Take 1 Tab by mouth two (2) times a day.  metFORMIN ER (GLUCOPHAGE XR) 500 mg tablet     hydrOXYzine (ATARAX) 50 mg tablet take 1 tablet by mouth every 12 hours     No current facility-administered medications for this visit. Health Maintenance   Topic Date Due    Pneumococcal 19-64 Medium Risk (1 of 1 - PPSV23) 06/19/1980    PAP AKA CERVICAL CYTOLOGY  09/25/2017    EYE EXAM RETINAL OR DILATED Q1  01/31/2018    HEMOGLOBIN A1C Q6M  02/01/2018    MICROALBUMIN Q1  12/04/2018    LIPID PANEL Q1  12/04/2018    FOOT EXAM Q1  12/28/2018    BREAST CANCER SCRN MAMMOGRAM  12/27/2019    DTaP/Tdap/Td series (2 - Td) 11/01/2022    COLONOSCOPY  04/01/2024    Hepatitis C Screening  Completed    Influenza Age 5 to Adult  Completed     Immunization History   Administered Date(s) Administered    Influenza Vaccine 09/27/2013    Influenza Vaccine (Quad) PF 12/17/2015, 10/13/2016, 10/17/2017    Influenza Vaccine PF 10/20/2014    Influenza Vaccine Split 11/01/2012    Pneumococcal Conjugate (PCV-13) 12/12/2016    TDAP Vaccine 11/01/2012     No LMP recorded. Patient is postmenopausal.        Allergies and Intolerances:    Allergies   Allergen Reactions    Digoxin Nausea Only    Niaspan [Niacin] Rash    Wellbutrin [Bupropion Hcl] Itching       Family History:   Family History   Problem Relation Age of Onset    Hypertension Mother     Heart Disease Mother     Kidney Disease Mother    [facility] Heart Disease Father     Emphysema Father        Social History:   She  reports that she has never smoked. She has never used smokeless tobacco.  She  reports that she does not drink alcohol. ROS:  · General: NAD,c/o left ear gen body aches  · HEENT: Positive for left ear pressure, HA  · Respiratory: no cough, shortness of breath, or wheezingnegative for - chills, fever, weight changes or malaise, night sweats  · Cardiovascular: no chest pain, palpitations, or dyspnea on exertion; no orthopnea or PND  · Gastrointestinal: no abdominal pain, N/V, change in bowel habits,  black or bloody stools, constipation or diarrhea  · Musculoskeletal: no back pain, joint pain, joint stiffness, muscle pain or muscle weakness  · Neurological: no numbness, tingling, headache or dizziness  · Psychological: negative for - anxiety, depression, sleep disturbances, suicidal or homicidal ideations    OBJECTIVE:   Physical exam:   Visit Vitals    /86 (BP 1 Location: Right arm, BP Patient Position: Sitting)    Pulse 99    Temp 97.5 °F (36.4 °C) (Oral)    Resp 17    Ht 5' 7\" (1.702 m)    Wt 245 lb (111.1 kg)    SpO2 90%    BMI 38.37 kg/m2        Generally: Pleasant female in no acute distress  HEENT Exam: Head: atraumatic, acephalic, c/o posterior HA. Eyes: PERRLA     Ears: bilaterally normal TM, no erythema or exudate, normal light reflex    Nares: mucosal membranes moist, no erythema    Mouth: Clear, no erythema or exudate    Neck: supple, no LAD    Cardiac Exam: regular, rate, and rhythm. Normal S1 and S2. No murmurs, gallops, or rubs  Pulmonary exam: Clear to auscultation bilaterally  Abdominal exam: Positive bowel sounds in all four quadrants, soft, nondistended, nontender  Extremities: 2+ dorsalis pedis pulses bilaterally.  No pedal edema    bilaterally    LABS/RADIOLOGICAL TESTS:  Lab Results   Component Value Date/Time    WBC 7.4 12/29/2016 04:51 PM    HGB 10.6 (L) 12/29/2016 04:51 PM    HCT 33.7 (L) 12/29/2016 04:51 PM    PLATELET 909 79/62/1462 04:51 PM     Lab Results   Component Value Date/Time    Sodium 141 08/01/2017 04:07 PM    Potassium 3.9 08/01/2017 04:07 PM    Chloride 107 08/01/2017 04:07 PM    CO2 26 08/01/2017 04:07 PM    Glucose 76 08/01/2017 04:07 PM    BUN 14 08/01/2017 04:07 PM    Creatinine 1.2 08/01/2017 04:07 PM     Lab Results   Component Value Date/Time    Cholesterol, total 124 01/16/2017 03:24 PM    HDL Cholesterol 51 01/16/2017 03:24 PM    LDL, calculated 45.4 01/16/2017 03:24 PM    Triglyceride 138 01/16/2017 03:24 PM     No results found for: GPT    All lab results and radiological studies were reviewed and discussed with the patient. ASSESSMENT/PLAN:    Diagnoses and all orders for this visit:    1. Sinus congestion  -     cetirizine-psuedoePHEDrine (ZYRTEC-D) 5-120 mg per tablet; Take 1 Tab by mouth two (2) times a day. -     amoxicillin (AMOXIL) 875 mg tablet; Take 1 Tab by mouth two (2) times a day. 2. Body aches  -     AMB POC RAPID INFLUENZA TEST    3. Other headache syndrome    4. Subacute frontal sinusitis  -     cetirizine-psuedoePHEDrine (ZYRTEC-D) 5-120 mg per tablet; Take 1 Tab by mouth two (2) times a day. -     amoxicillin (AMOXIL) 875 mg tablet; Take 1 Tab by mouth two (2) times a day. Requested Prescriptions     Signed Prescriptions Disp Refills    cetirizine-psuedoePHEDrine (ZYRTEC-D) 5-120 mg per tablet 30 Tab 0     Sig: Take 1 Tab by mouth two (2) times a day.  amoxicillin (AMOXIL) 875 mg tablet 20 Tab 0     Sig: Take 1 Tab by mouth two (2) times a day. Patient verbalized understanding and agreement with the plan. Patient was given an after-visit summary. Follow-up Disposition:  Return if symptoms worsen or fail to improve. or sooner if worsening symptoms.         Dayne Jacobs fNP

## 2018-02-07 NOTE — PROGRESS NOTES
ROOM # 3    Racquel Carter presents today for   Chief Complaint   Patient presents with    Headache    Ear Pain     left x 3 days     Nausea       Racquel Carter preferred language for health care discussion is english/other. Is someone accompanying this pt? no    Is the patient using any DME equipment during OV? no    Depression Screening:  PHQ over the last two weeks 12/14/2017 7/18/2017 3/16/2017 12/12/2016 10/13/2016 8/30/2016 8/16/2016   PHQ Not Done - Active Diagnosis of Depression or Bipolar Disorder Active Diagnosis of Depression or Bipolar Disorder - - Active Diagnosis of Depression or Bipolar Disorder Active Diagnosis of Depression or Bipolar Disorder   Little interest or pleasure in doing things Not at all Not at all - Several days Not at all - -   Feeling down, depressed or hopeless Not at all Not at all - Several days Not at all - -   Total Score PHQ 2 0 0 - 2 0 - -       Learning Assessment:  Learning Assessment 11/24/2014   PRIMARY LEARNER Patient   HIGHEST LEVEL OF EDUCATION - PRIMARY LEARNER  2 YEARS [facility] PRIMARY LEARNER NONE   CO-LEARNER CAREGIVER No   PRIMARY LANGUAGE ENGLISH   LEARNER PREFERENCE PRIMARY DEMONSTRATION   ANSWERED BY self   RELATIONSHIP SELF       Abuse Screening:  n/i    Fall Risk  n/i    Health Maintenance reviewed and discussed per provider. Yes    Racquel Carter is due for nothing at this time. Please order/place referral if appropriate. Advance Directive:  1. Do you have an advance directive in place? Patient Reply: no    2. If not, would you like material regarding how to put one in place? Patient Reply: no    Coordination of Care:  1. Have you been to the ER, urgent care clinic since your last visit? Hospitalized since your last visit? no    2. Have you seen or consulted any other health care providers outside of the [address] [place] [address] since your last visit? Include any pap smears or colon screening.  no

## 2018-02-07 NOTE — PATIENT INSTRUCTIONS
Headache: Care Instructions  Your Care Instructions    Headaches have many possible causes. Most headaches aren't a sign of a more serious problem, and they will get better on their own. Home treatment may help you feel better faster. The doctor has checked you carefully, but problems can develop later. If you notice any problems or new symptoms, get medical treatment right away. Follow-up care is a key part of your treatment and safety. Be sure to make and go to all appointments, and call your doctor if you are having problems. It's also a good idea to know your test results and keep a list of the medicines you take. How can you care for yourself at home? · Do not drive if you have taken a prescription pain medicine. · Rest in a quiet, dark room until your headache is gone. Close your eyes and try to relax or go to sleep. Don't watch TV or read. · Put a cold, moist cloth or cold pack on the painful area for 10 to 20 minutes at a time. Put a thin cloth between the cold pack and your skin. · Use a warm, moist towel or a heating pad set on low to relax tight shoulder and neck muscles. · Have someone gently massage your neck and shoulders. · Take pain medicines exactly as directed. ¨ If the doctor gave you a prescription medicine for pain, take it as prescribed. ¨ If you are not taking a prescription pain medicine, ask your doctor if you can take an over-the-counter medicine. · Be careful not to take pain medicine more often than the instructions allow, because you may get worse or more frequent headaches when the medicine wears off. · Do not ignore new symptoms that occur with a headache, such as a fever, weakness or numbness, vision changes, or confusion. These may be signs of a more serious problem. To prevent headaches  · Keep a headache diary so you can figure out what triggers your headaches. Avoiding triggers may help you prevent headaches.  Record when each headache began, how long it lasted, and what the pain was like (throbbing, aching, stabbing, or dull). Write down any other symptoms you had with the headache, such as nausea, flashing lights or dark spots, or sensitivity to bright light or loud noise. Note if the headache occurred near your period. List anything that might have triggered the headache, such as certain foods (chocolate, cheese, wine) or odors, smoke, bright light, stress, or lack of sleep. · Find healthy ways to deal with stress. Headaches are most common during or right after stressful times. Take time to relax before and after you do something that has caused a headache in the past.  · Try to keep your muscles relaxed by keeping good posture. Check your jaw, face, neck, and shoulder muscles for tension, and try relaxing them. When sitting at a desk, change positions often, and stretch for 30 seconds each hour. · Get plenty of sleep and exercise. · Eat regularly and well. Long periods without food can trigger a headache. · Treat yourself to a massage. Some people find that regular massages are very helpful in relieving tension. · Limit caffeine by not drinking too much coffee, tea, or soda. But don't quit caffeine suddenly, because that can also give you headaches. · Reduce eyestrain from computers by blinking frequently and looking away from the computer screen every so often. Make sure you have proper eyewear and that your monitor is set up properly, about an arm's length away. · Seek help if you have depression or anxiety. Your headaches may be linked to these conditions. Treatment can both prevent headaches and help with symptoms of anxiety or depression. When should you call for help? Call 911 anytime you think you may need emergency care. For example, call if:  ? · You have signs of a stroke. These may include:  ¨ Sudden numbness, paralysis, or weakness in your face, arm, or leg, especially on only one side of your body. ¨ Sudden vision changes.   ¨ Sudden trouble speaking. ¨ Sudden confusion or trouble understanding simple statements. ¨ Sudden problems with walking or balance. ¨ A sudden, severe headache that is different from past headaches. ?Call your doctor now or seek immediate medical care if:  ? · You have a new or worse headache. ? · Your headache gets much worse. Where can you learn more? Go to http://rm-shannon.info/. Enter M271 in the search box to learn more about \"Headache: Care Instructions. \"  Current as of: 2016  Content Version: 11.4  © 9321-4292 [employer]. Care instructions adapted under license by [employer] (which disclaims liability or warranty for this information). If you have questions about a medical condition or this instruction, always ask your healthcare professional. [place] [address] any warranty or liability for your use of this information. Signs of Pain in a Child: Care Instructions  Your Care Instructions    Pain can be hard for a child to describe. An older child may be able to describe how the pain feels or tell you whether the pain comes and goes. A toddler may complain of pain or tell you that he or she is not feeling well. But the signs of pain in an infant can sometimes be hard to recognize. A persistent cry in a  may be the first sign of a serious illness. A child with a serious illness or problem, such as an ear infection, usually cries longer than normal.  Follow-up care is a key part of your child's treatment and safety. Be sure to make and go to all appointments, and call your doctor if your child is having problems. It's also a good idea to know your child's test results and keep a list of the medicines your child takes. How can you care for your child at home? Watch for these signs of pain  · The signs listed below may help you decide whether your child's pain is mild, moderate, or severe.  A child with severe pain will have more of these behaviors and may be harder to comfort. Look for:  ¨ Changes in usual behavior. Your child may eat less or become fussy or restless. ¨ Crying that can't be comforted. ¨ Crying, grunting, or breath-holding. ¨ Facial expressions, such as a furrowed brow, a wrinkled forehead, closed eyes, or an angry appearance. ¨ Sleep changes, such as waking often or sleeping more or less than usual. Even children in severe pain may take short naps because they are so tired. ¨ Body movements, such as making fists, protecting a part of the body (especially while walking), kicking, clinging to whoever holds him or her, or not moving. · Also look for signs of injury or illness, including:  ¨ Swelling, bruises, or bleeding. ¨ Fever, vomiting, diarrhea, or crying during feeding. Also check for an open pin sticking the skin; a red spot that may be an insect bite; or a strand of hair wrapped around a finger, a toe, or a boy's penis. To treat mild or moderate pain  · Give your child acetaminophen (Tylenol) or ibuprofen (Advil, Motrin) for pain. Read and follow all instructions on the label. For children younger than 10months of age, follow your doctor's instructions about how much to give your child. · Do not give aspirin to anyone younger than 20. It has been linked to Reye syndrome, a serious illness. · Be careful when giving your child over-the-counter cold or flu medicines and Tylenol at the same time. Many of these medicines have acetaminophen, which is Tylenol. Read the labels to make sure that you are not giving your child more than the recommended dose. Too much acetaminophen (Tylenol) can be harmful. When should you call for help? Call 911 anytime you think your child may need emergency care. For example, call if:  ? · You feel that your baby is extremely sick. A sick baby:  ¨ May be limp and floppy like a rag doll. ¨ May not respond at all to being held, touched, or talked to. ¨ May be hard to wake up. ?Call your doctor now or seek immediate medical care if:  ? · Your child is in pain and you do not know why.   ? · You believe your child is in severe pain. ? · Your child is very fussy and cannot be comforted. ? · Your child does not seem better after taking medicine for pain. ? Watch closely for changes in your child's health, and be sure to contact your doctor if:  ? · Your child does not get better as expected. Where can you learn more? Go to http://rm-shannon.info/. Enter  in the search box to learn more about \"Signs of Pain in a Child: Care Instructions. \"  Current as of: October 14, 2016  Content Version: 11.4  © 6181-9849 [employer]. Care instructions adapted under license by [employer] (which disclaims liability or warranty for this information). If you have questions about a medical condition or this instruction, always ask your healthcare professional. [place] [address] any warranty or liability for your use of this information. Sinusitis: Care Instructions  Your Care Instructions    Sinusitis is an infection of the lining of the sinus cavities in your head. Sinusitis often follows a cold. It causes pain and pressure in your head and face. In most cases, sinusitis gets better on its own in 1 to 2 weeks. But some mild symptoms may last for several weeks. Sometimes antibiotics are needed. Follow-up care is a key part of your treatment and safety. Be sure to make and go to all appointments, and call your doctor if you are having problems. It's also a good idea to know your test results and keep a list of the medicines you take. How can you care for yourself at home? · Take an over-the-counter pain medicine, such as acetaminophen (Tylenol), ibuprofen (Advil, Motrin), or naproxen (Aleve). Read and follow all instructions on the label. · If the doctor prescribed antibiotics, take them as directed.  Do not stop taking them just because you feel better. You need to take the full course of antibiotics. · Be careful when taking over-the-counter cold or flu medicines and Tylenol at the same time. Many of these medicines have acetaminophen, which is Tylenol. Read the labels to make sure that you are not taking more than the recommended dose. Too much acetaminophen (Tylenol) can be harmful. · Breathe warm, moist air from a steamy shower, a hot bath, or a sink filled with hot water. Avoid cold, dry air. Using a humidifier in your home may help. Follow the directions for cleaning the machine. · Use saline (saltwater) nasal washes to help keep your nasal passages open and wash out mucus and bacteria. You can buy saline nose drops at a grocery store or drugstore. Or you can make your own at home by adding 1 teaspoon of salt and 1 teaspoon of baking soda to 2 cups of distilled water. If you make your own, fill a bulb syringe with the solution, insert the tip into your nostril, and squeeze gently. Amrita Lucien your nose. · Put a hot, wet towel or a warm gel pack on your face 3 or 4 times a day for 5 to 10 minutes each time. · Try a decongestant nasal spray like oxymetazoline (Afrin). Do not use it for more than 3 days in a row. Using it for more than 3 days can make your congestion worse. When should you call for help? Call your doctor now or seek immediate medical care if:  ? · You have new or worse swelling or redness in your face or around your eyes. ? · You have a new or higher fever. ? Watch closely for changes in your health, and be sure to contact your doctor if:  ? · You have new or worse facial pain. ? · The mucus from your nose becomes thicker (like pus) or has new blood in it. ? · You are not getting better as expected. Where can you learn more? Go to http://rm-shannon.info/. Enter G596 in the search box to learn more about \"Sinusitis: Care Instructions. \"  Current as of:  May 12, 2017  Content Version: 11.4  © 1909-6995 [employer]. Care instructions adapted under license by [employer] (which disclaims liability or warranty for this information). If you have questions about a medical condition or this instruction, always ask your healthcare professional. [address] any warranty or liability for your use of this information.

## 2018-04-05 ENCOUNTER — OFFICE VISIT (OUTPATIENT)
Dept: INTERNAL MEDICINE CLINIC | Age: 58
End: 2018-04-05

## 2018-04-05 VITALS
RESPIRATION RATE: 17 BRPM | DIASTOLIC BLOOD PRESSURE: 68 MMHG | BODY MASS INDEX: 37.98 KG/M2 | TEMPERATURE: 97.6 F | WEIGHT: 242 LBS | HEART RATE: 96 BPM | OXYGEN SATURATION: 98 % | SYSTOLIC BLOOD PRESSURE: 112 MMHG | HEIGHT: 67 IN

## 2018-04-05 DIAGNOSIS — E11.9 TYPE 2 DIABETES MELLITUS WITHOUT COMPLICATION, WITHOUT LONG-TERM CURRENT USE OF INSULIN (HCC): Chronic | ICD-10-CM

## 2018-04-05 DIAGNOSIS — M79.641 PAIN OF RIGHT HAND: ICD-10-CM

## 2018-04-05 DIAGNOSIS — I10 ESSENTIAL HYPERTENSION: Chronic | ICD-10-CM

## 2018-04-05 DIAGNOSIS — Z00.00 ROUTINE GENERAL MEDICAL EXAMINATION AT A HEALTH CARE FACILITY: Primary | ICD-10-CM

## 2018-04-05 PROBLEM — E66.01 SEVERE OBESITY (BMI 35.0-39.9) WITH COMORBIDITY (HCC): Status: ACTIVE | Noted: 2018-04-05

## 2018-04-05 LAB
GLUCOSE POC: 117 MG/DL
HBA1C MFR BLD HPLC: 5.9 %

## 2018-04-05 RX ORDER — BUPROPION HYDROCHLORIDE 100 MG/1
100 TABLET, EXTENDED RELEASE ORAL DAILY
Refills: 0 | COMMUNITY
Start: 2018-04-02 | End: 2018-05-21

## 2018-04-05 NOTE — MR AVS SNAPSHOT
[facility] 
 
 
 [address] [address] 
[phone] Patient: Angie Ruvalcaba MRN: [id number] [id number] Visit Information Date & Time Provider Department Dept. Phone Encounter #  
 4/5/2018 11:00 AM MD [address] [phone] [id number] Follow-up Instructions Return in about 6 months (around 10/5/2018) for cholesterol, HTN, DM. Your Appointments 5/21/2018 10:00 AM  
ESTABLISHED PATIENT with Adam Traylor MD  
[facility] ([facility]) Appt Note: RTC 4 months for reevaluation; sooner if needed. [address] [address]  
  
    
 10/5/2018 11:00 AM  
Office Visit with MD [address] ([facility]) Appt Note: 6 month f/u HTN/DM; chol  
 [address]  
  
   
 [address] [facility] Upcoming Health Maintenance Date Due Pneumococcal 19-64 Medium Risk (1 of 1 - PPSV23) 6/19/1980 PAP AKA CERVICAL CYTOLOGY 9/25/2017 EYE EXAM RETINAL OR DILATED Q1 1/31/2018 HEMOGLOBIN A1C Q6M 2/1/2018 MEDICARE YEARLY EXAM 3/14/2018 MICROALBUMIN Q1 12/4/2018 LIPID PANEL Q1 12/4/2018 FOOT EXAM Q1 12/28/2018 BREAST CANCER SCRN MAMMOGRAM 12/27/2019 DTaP/Tdap/Td series (2 - Td) 11/1/2022 COLONOSCOPY 4/1/2024 Allergies as of 4/5/2018  Review Complete On: 4/5/2018 By: Jany Romano MD  
  
 Severity Noted Reaction Type Reactions Digoxin  11/24/2014   Side Effect Nausea Only Niaspan [Niacin]  10/31/2011   Side Effect Rash Wellbutrin [Bupropion Hcl]  11/24/2014   Side Effect Itching Current Immunizations  Reviewed on 9/27/2013 Name Date  Influenza Vaccine 9/27/2013  3:46 PM  
 Influenza Vaccine (Quad) PF 10/17/2017, 10/13/2016, 12/17/2015  2:37 PM  
 Influenza Vaccine PF 10/20/2014 Influenza Vaccine Split 11/1/2012  9:46 AM  
 Pneumococcal Conjugate (PCV-13) 12/12/2016 TDAP Vaccine 11/1/2012  9:45 AM  
  
 Not reviewed this visit You Were Diagnosed With   
  
 Codes Comments Type 2 diabetes mellitus without complication, without long-term current use of insulin (HCC)    -  Primary ICD-10-CM: E11.9 ICD-9-CM: 250.00 Essential hypertension     ICD-10-CM: I10 
ICD-9-CM: 401.9 Pain of right hand     ICD-10-CM: M79.641 ICD-9-CM: 729.5 Vitals BP Pulse Temp Resp Height(growth percentile) Weight(growth percentile) 112/68 96 97.6 °F (36.4 °C) (Oral) 17 5' 7\" (1.702 m) 242 lb (109.8 kg) SpO2 BMI OB Status Smoking Status 98% 37.9 kg/m2 Postmenopausal Never Smoker Vitals History BMI and BSA Data Body Mass Index Body Surface Area  
 37.9 kg/m 2 2.28 m 2 Preferred Pharmacy Pharmacy Name Phone [address]  [facility] [phone] Your Updated Medication List  
  
   
This list is accurate as of 4/5/18 12:36 PM.  Always use your most recent med list.  
  
  
  
  
 aspirin 81 mg chewable tablet Take 81 mg by mouth daily. BD INSULIN PEN NEEDLE UF MINI 31 gauge x 3/16\" Ndle Generic drug:  Insulin Needles (Disposable) buPROPion  mg SR tablet Commonly known as:  Anamaria Bolder Take 100 mg by mouth daily. CALCIUM 600 + D(3) PO Take 2 Caps by mouth daily. carvedilol 12.5 mg tablet Commonly known as:  COREG  
take 1 tablet by mouth twice a day  
  
 cetirizine-psuedoePHEDrine 5-120 mg per tablet Commonly known as:  ZyrTEC-D Take 1 Tab by mouth two (2) times a day. clonazePAM 0.5 mg tablet Commonly known as:  Maida Payer Take  by mouth two (2) times a day. COQ10  100-100 mg-unit Cap Generic drug:  coenzyme q10-vitamin e  
 Take  by mouth two (2) times a day. CRESTOR 40 mg tablet Generic drug:  rosuvastatin Take 40 mg by mouth daily. NON FORMULARY DULoxetine 60 mg capsule Commonly known as:  CYMBALTA  
120 mg daily. enalapril 20 mg tablet Commonly known as:  VASOTEC  
take 1 tablet by mouth once daily  
  
 estradiol 0.01 % (0.1 mg/gram) vaginal cream  
Commonly known as:  ESTRACE Insert 2 g into vagina daily. Apply fingertip amount to outside of vaginal opening. HYDROcodone-acetaminophen 5-300 mg tablet Commonly known as:  Eusebia Rast  
take 1 tablet by mouth every 4 to 6 hours if needed  
  
 hydrOXYzine HCl 50 mg tablet Commonly known as:  ATARAX  
take 1 tablet by mouth every 12 hours LOFIBRA 134 mg capsule Generic drug:  fenofibrate micronized Take  by mouth every morning. loratadine 10 mg Cap Take  by mouth.  
  
 lubiPROStone 8 mcg capsule Commonly known as:  Chris Applegate Take 2 Caps by mouth two (2) times daily (with meals). Indications: chronic idiopathic constipation  
  
 metFORMIN  mg tablet Commonly known as:  GLUCOPHAGE XR  
  
 MIRALAX 17 gram/dose powder Generic drug:  polyethylene glycol Take 17 g by mouth daily. multivitamin tablet Commonly known as:  ONE A DAY Take 1 Tab by mouth daily. nystatin powder Commonly known as:  MYCOSTATIN Apply  to affected area as needed. omeprazole 20 mg capsule Commonly known as:  PRILOSEC  
two (2) times a day. spironolactone 25 mg tablet Commonly known as:  ALDACTONE Take 25 mg by mouth daily. SUMAtriptan succinate 6 mg/0.5 mL kit Commonly known as:  IMITREX STATDOSE PEN  
6 mg by SubCUTAneous route once as needed for Migraine. tiZANidine 4 mg tablet Commonly known as:  Molinda Linda Take 1 Tab by mouth two (2) times daily as needed. Indications: Muscle Spasm  
  
 triamcinolone acetonide 0.025 % topical cream  
Commonly known as:  KENALOG Apply  to affected area two (2) times a day. use thin layer TRULICITY 1.5 EW/1.9 mL sub-q pen Generic drug:  dulaglutide 1.5 mg by SubCUTAneous route every seven (7) days. We Performed the Following AMB POC GLUCOSE BLOOD, BY GLUCOSE MONITORING DEVICE [69578 CPT(R)] AMB POC HEMOGLOBIN A1C [56963 CPT(R)] Follow-up Instructions Return in about 6 months (around 10/5/2018) for cholesterol, HTN, DM. Patient Instructions Medicare Wellness Visit, Female The best way to live healthy is to have a healthy lifestyle by eating a well-balanced diet, exercising regularly, limiting alcohol and stopping smoking. Regular physical exams and screening tests are another way to keep healthy. Preventive exams provided by your health care provider can find health problems before they become diseases or illnesses. Preventive services including immunizations, screening tests, monitoring and exams can help you take care of your own health. All people over age 72 should have a pneumovax  and and a prevnar shot to prevent pneumonia. These are once in a lifetime unless you and your provider decide differently. All people over 65 should have a yearly flu shot and a tetanus vaccine every 10 years. A bone mass density to screen for osteoporosis or thinning of the bones should be done every 2 years after 65. Screening for diabetes mellitus with a blood sugar test should be done every year. Glaucoma is a disease of the eye due to increased ocular pressure that can lead to blindness and it should be done every year by an eye professional. 
 
Cardiovascular screening tests that check for elevated lipids (fatty part of blood) which can lead to heart disease and strokes should be done every 5 years.  
 
Colorectal screening that evaluates for blood or polyps in your colon should be done yearly as a stool test or every five years as a flexible sigmoidoscope or every 10 years as a colonoscopy up to age 76. Breast cancer screening with a mammogram is recommended biennially  for women age 54-69. Screening for cervical cancer with a pap smear and pelvic exam is recommended for women after age 72 years every 2 years up to age 79 or when the provider and patient decide to stop. If there is a history of cervical abnormalities or other increased risk for cancer then the test is recommended yearly. Hepatitis C screening is also recommended for anyone born between [birth year] through [address]. A shingles vaccine is also recommended once in a lifetime after age 61. Your Medicare Wellness Exam is recommended annually. Here is a list of your current Health Maintenance items with a due date: 
Health Maintenance Due Topic Date Due  Pneumococcal Vaccine (1 of 1 - PPSV23) 06/19/1980  Cervical Cancer Screening  09/25/2017 Rosemary Appiah Eye Exam  01/31/2018  Hemoglobin A1C    02/01/2018 Rosemary Appiah Annual Well Visit  03/14/2018 [facility] SERVICES! Dear Dante Louis: Thank you for requesting a [employer] account. Our records indicate that you already have an active [employer] account. You can access your account anytime at [email or web address]. [email or web address] Did you know that you can access your hospital and ER discharge instructions at any time in [employer]? You can also review all of your test results from your hospital stay or ER visit. Additional Information If you have questions, please visit the Frequently Asked Questions section of the [employer] website at [email or web address] Remember, [employer] is NOT to be used for urgent needs. For medical emergencies, dial 911. Now available from your iPhone and Android! Please provide this summary of care documentation to your next provider. Your primary care clinician is listed as [facility].  If you have any questions after today's visit, please call [phone].

## 2018-04-05 NOTE — PROGRESS NOTES
Identified pt with two pt identifiers(name and ). Reviewed record in preparation for visit and have obtained necessary documentation. Chief Complaint   Patient presents with    Hypertension     (Rm #6) 14 wk f/u    Diabetes    Hand Pain     right hand is stiff        Health Maintenance Due   Topic    Pneumococcal 19-64 Medium Risk (1 of 1 - PPSV23)    PAP AKA CERVICAL CYTOLOGY     EYE EXAM RETINAL OR DILATED Q1     HEMOGLOBIN A1C Q6M     MEDICARE YEARLY EXAM    - -1/15/18:  Dr. Gabby Whittaker (eye)    Coordination of Care Questionnaire:  :   1) Have you been to an emergency room, urgent care clinic since your last visit? no   Hospitalized since your last visit? no             2. Have seen or consulted any other health care provider since your last visit? YES  If yes, where when, and reason for visit?    - Rian Andrea NP (weight management)  - Dr. Pb Lewis (uro)  -1/15/18:  Dr. Gabby Whittaker (eye)  - Dr. [facility] (cardiac)    Patient is accompanied by self I have received verbal consent from Irvin Mckee to discuss any/all medical information while they are present in the room.

## 2018-04-05 NOTE — PATIENT INSTRUCTIONS

## 2018-04-05 NOTE — PROGRESS NOTES
HISTORY OF PRESENT ILLNESS  Phu Yan is a 65 y.o. female. Visit Vitals    /68    Pulse 96    Temp 97.6 °F (36.4 °C) (Oral)    Resp 17    Ht 5' 7\" (1.702 m)    Wt 242 lb (109.8 kg)    SpO2 98%    BMI 37.9 kg/m2       HPI Comments: Reports her depression is worse this year. She sees psychiatry and they have adjusted her meds. She just does not feel well. No energy. Wants to stay in bed all day. Will only get up when she needs to use the bathroom. Sometimes she will not eat      Blood sugars have been good. Usually checks once a day. See media. She goes back in June to [facility]    Hypertension    The history is provided by the patient. This is a chronic problem. The current episode started more than 1 week ago. The problem has not changed since onset. Risk factors include obesity, stress and diabetes mellitus. Diabetes   The history is provided by the patient. This is a chronic problem. The current episode started more than 1 week ago. The problem occurs daily. The problem has not changed since onset. Exacerbated by: diet. The symptoms are relieved by medications (diet). Treatments tried: see med list.   Hand Pain    The history is provided by the patient (right hand (she is left hand dominant)). This is a new problem. The current episode started more than 1 week ago. The problem occurs daily. The problem has not changed since onset. Associated symptoms include stiffness. Pertinent negatives include no numbness. She has tried nothing for the symptoms. Review of Systems   Musculoskeletal: Positive for joint pain and stiffness. Neurological: Negative for numbness. Physical Exam   Constitutional: She is oriented to person, place, and time. She appears well-developed and well-nourished. No distress. Neck:       Cardiovascular: Normal rate and regular rhythm. Pulmonary/Chest: Effort normal and breath sounds normal.   Musculoskeletal: She exhibits no edema.         Right hand: She exhibits decreased range of motion. She exhibits no tenderness and no bony tenderness. Normal sensation noted. Normal strength noted. Neurological: She is alert and oriented to person, place, and time. Skin: Skin is warm and dry. She is not diaphoretic. Psychiatric: She has a normal mood and affect. Nursing note and vitals reviewed. ASSESSMENT and PLAN    ICD-10-CM ICD-9-CM    1. Type 2 diabetes mellitus without complication, without long-term current use of insulin (HCC) E11.9 250.00 AMB POC HEMOGLOBIN A1C      AMB POC GLUCOSE BLOOD, BY GLUCOSE MONITORING DEVICE   2. Essential hypertension I10 401.9    3. Pain of right hand M79.641 729.5        BP controlled    Lab has been controlled. Will f/u with the [facility] [place] in June    Hand pain and stiffness--most likely osteoarthritis    Will track down other notes and lab    F/u 6 months        The following is a separate encounter note: This is the Subsequent Medicare Annual Wellness Exam, performed 12 months or more after the Initial AWV or the last Subsequent AWV    I have reviewed the patient's medical history in detail and updated the computerized patient record.      History     Past Medical History:   Diagnosis Date    Abnormal bone density screening 3/10/2011    Anemia 1996    Baker's cyst     left    CTS (carpal tunnel syndrome) 1999    bilat    Depression 2000    Diabetes mellitus     Dysfunctional voiding of urine     Fibromyalgia 2000    GERD (gastroesophageal reflux disease)     H/O bone density study 04/03/2017    osteopenia,   SEE MEDIA    History of meniscal tear     Metacarpal bone fracture 2003    left 5th    Migraines     OAB (overactive bladder)     documented on UDS 2009    Obesity     Osteopenia march 2006    Plantar fasciitis 1996    Pre-syncope 7/19/16    Sleep apnea     Type II or unspecified type diabetes mellitus with neurological manifestations, uncontrolled(250.62) ([address]     Unspecified hereditary and idiopathic peripheral neuropathy     Unspecified urinary incontinence     Urge incontinence     Urinary tract infection, site not specified     Vitamin D deficiency       Past Surgical History:   Procedure Laterality Date    CARDIAC CATHETERIZATION  2006    nml coronary arteries    COLONOSCOPY      5 yr f/u, Dr. Seamus Don    EGD      HX ARTHROTOMY  1/15    left shoulder, with decompression. Dr. Alex Massey  2016    HX  SECTION       [address]    HX COLONOSCOPY  2014    HX ENDOSCOPY      ENDOSCOPY,COLON, DIAGNOSTIC    HX KNEE ARTHROSCOPY      HX OTHER SURGICAL  2015    SHOULDER REPAIR    WRIST ARTHROSCOP,RELEASE Eliana Hernandez LIG  12/23/15    Carpal Tunnel Release    WRIST ARTHROSCOP,RELEASE Eliana Hernandez LIG  16    Carpal Tunnel release     Current Outpatient Prescriptions   Medication Sig Dispense Refill    buPROPion SR (WELLBUTRIN SR) 100 mg SR tablet Take 100 mg by mouth daily. 0    carvedilol (COREG) 12.5 mg tablet take 1 tablet by mouth twice a day  0    triamcinolone acetonide (KENALOG) 0.025 % topical cream Apply  to affected area two (2) times a day. use thin layer 15 g 5    enalapril (VASOTEC) 20 mg tablet take 1 tablet by mouth once daily 30 Tab 5    lubiPROStone (AMITIZA) 8 mcg capsule Take 2 Caps by mouth two (2) times daily (with meals). Indications: chronic idiopathic constipation 120 Cap 5    dulaglutide (TRULICITY) 1.5 VY/0.1 mL sub-q pen 1.5 mg by SubCUTAneous route every seven (7) days.  estradiol (ESTRACE) 0.01 % (0.1 mg/gram) vaginal cream Insert 2 g into vagina daily. Apply fingertip amount to outside of vaginal opening. 42.5 g 2    loratadine 10 mg cap Take  by mouth.  DULoxetine (CYMBALTA) 60 mg capsule 120 mg daily.   0    hydrOXYzine (ATARAX) 50 mg tablet take 1 tablet by mouth every 12 hours  0    omeprazole (PRILOSEC) 20 mg capsule two (2) times a day.  0    multivitamin (ONE A DAY) tablet Take 1 Tab by mouth daily.  BD INSULIN PEN NEEDLE UF MINI 31 x 3/16 \" ndle   0    spironolactone (ALDACTONE) 25 mg tablet Take 25 mg by mouth daily.  coenzyme q10-vitamin e (COQ10 ) 100-100 mg-unit cap Take  by mouth two (2) times a day.  clonazepam (KLONOPIN) 0.5 mg tablet Take  by mouth two (2) times a day.  rosuvastatin (CRESTOR) 40 mg tablet Take 40 mg by mouth daily. NON FORMULARY       fenofibrate micronized (LOFIBRA) 134 mg capsule Take  by mouth every morning.  polyethylene glycol (MIRALAX) 17 gram/dose powder Take 17 g by mouth daily.  aspirin 81 mg chewable tablet Take 81 mg by mouth daily.  CALCIUM CARBONATE/VITAMIN D3 (CALCIUM 600 + D,3, PO) Take 2 Caps by mouth daily.  cetirizine-psuedoePHEDrine (ZYRTEC-D) 5-120 mg per tablet Take 1 Tab by mouth two (2) times a day. 30 Tab 0    HYDROcodone-acetaminophen (XODOL) 5-300 mg tablet take 1 tablet by mouth every 4 to 6 hours if needed  0    tiZANidine (ZANAFLEX) 4 mg tablet Take 1 Tab by mouth two (2) times daily as needed. Indications: Muscle Spasm 30 Tab 1    metFORMIN ER (GLUCOPHAGE XR) 500 mg tablet       nystatin (MYCOSTATIN) powder Apply  to affected area as needed.  SUMAtriptan succinate (IMITREX STATDOSE PEN) 6 mg/0.5 mL kit 6 mg by SubCUTAneous route once as needed for Migraine. 1 Kit 11     Allergies   Allergen Reactions    Digoxin Nausea Only    Niaspan [Niacin] Rash    Wellbutrin [Bupropion Hcl] Itching     Family History   Problem Relation Age of Onset    Hypertension Mother     Heart Disease Mother     Kidney Disease Mother     Heart Disease Father     Emphysema Father      Social History   Substance Use Topics    Smoking status: Never Smoker    Smokeless tobacco: Never Used    Alcohol use No     Patient Active Problem List   Diagnosis Code    Migraine aura without headache G43. 109    Fatigue R53.83    Urge incontinence N39.41    OAB (overactive bladder) N32.81    Disorder of urinary tract N39.9    Constipation K59.00    Urinary incontinence R32    Dysfunctional voiding of urine N39.8    Hematuria R31.9    Type 2 diabetes mellitus without complication, without long-term current use of insulin (HCC) E11.9    Dyslipidemia E78.5    Essential hypertension I10    Type 2 diabetes mellitus with nephropathy (HCC) E11.21    Severe obesity (BMI 35.0-39. 9) with comorbidity ([facility] [address] E66.01       Depression Risk Factor Screening:     PHQ over the last two weeks 12/14/2017   PHQ Not Done -   Little interest or pleasure in doing things Not at all   Feeling down, depressed or hopeless Not at all   Total Score PHQ 2 0     Alcohol Risk Factor Screening: You do not drink alcohol or very rarely. Functional Ability and Level of Safety:   Hearing Loss  Hearing is good. Activities of Daily Living  The home contains: no safety equipment. Patient does total self care    Fall Risk  No flowsheet data found. Abuse Screen  Patient is not abused    Cognitive Screening   Evaluation of Cognitive Function:  Has your family/caregiver stated any concerns about your memory: no  Normal, Animal Naming test    Patient Care Team   Patient Care Team:  Barrie Heimlich, MD as PCP - General (Internal Medicine)  Renetta Velásquez MD as Consulting Provider (Endocrinology)  Casa Pacheco MD as Consulting Provider (Obstetrics & Gynecology)  Latonia Villanueva MD as Consulting Provider (Orthopedic Surgery)  Gilma Calhoun MD as Consulting Provider (Urology)  Thelma Shaffer MD as Physician (Optometry)  Emma Quiroz MD as Consulting Provider (Orthopedic Surgery)  Vicki Jj MD as Physician (Gastroenterology)  Abi Hannon MD as Consulting Provider ([address] [place] [address] Vascular Surgery)    Assessment/Plan   Education and counseling provided:  Are appropriate based on today's review and evaluation    Diagnoses and all orders for this visit:      1. .   Subsequent Medicare Wellness Visit          Health Maintenance Due   Topic Date Due    Pneumococcal 19-64 Medium Risk (1 of 1 - PPSV23) 06/19/1980    PAP AKA CERVICAL CYTOLOGY  09/25/2017    EYE EXAM RETINAL OR DILATED Q1  01/31/2018    HEMOGLOBIN A1C Q6M  02/01/2018    MEDICARE YEARLY EXAM  03/14/2018

## 2018-04-23 RX ORDER — NYSTATIN 100000 [USP'U]/G
POWDER TOPICAL
Qty: 60 G | Refills: 11 | Status: SHIPPED | OUTPATIENT
Start: 2018-04-23 | End: 2019-04-29 | Stop reason: SDUPTHER

## 2018-05-28 DIAGNOSIS — K59.04 CHRONIC IDIOPATHIC CONSTIPATION: ICD-10-CM

## 2018-05-28 RX ORDER — LUBIPROSTONE 8 UG/1
CAPSULE, GELATIN COATED ORAL
Qty: 120 CAP | Refills: 5 | Status: SHIPPED | OUTPATIENT
Start: 2018-05-28 | End: 2018-12-19 | Stop reason: SDUPTHER

## 2018-06-18 DIAGNOSIS — Z76.0 MEDICATION REFILL: ICD-10-CM

## 2018-06-18 RX ORDER — ENALAPRIL MALEATE 20 MG/1
TABLET ORAL
Qty: 30 TAB | Refills: 5 | Status: SHIPPED | OUTPATIENT
Start: 2018-06-18 | End: 2019-01-11 | Stop reason: SDUPTHER

## 2018-08-01 ENCOUNTER — OFFICE VISIT (OUTPATIENT)
Dept: INTERNAL MEDICINE CLINIC | Age: 58
End: 2018-08-01

## 2018-08-01 VITALS
BODY MASS INDEX: 38.92 KG/M2 | WEIGHT: 248 LBS | RESPIRATION RATE: 15 BRPM | TEMPERATURE: 98.3 F | HEART RATE: 87 BPM | DIASTOLIC BLOOD PRESSURE: 78 MMHG | OXYGEN SATURATION: 97 % | SYSTOLIC BLOOD PRESSURE: 116 MMHG | HEIGHT: 67 IN

## 2018-08-01 DIAGNOSIS — M54.50 CHRONIC MIDLINE LOW BACK PAIN WITHOUT SCIATICA: Primary | ICD-10-CM

## 2018-08-01 DIAGNOSIS — R10.13 DYSPEPSIA: ICD-10-CM

## 2018-08-01 DIAGNOSIS — L30.9 ECZEMA, UNSPECIFIED TYPE: ICD-10-CM

## 2018-08-01 DIAGNOSIS — G89.29 CHRONIC MIDLINE LOW BACK PAIN WITHOUT SCIATICA: Primary | ICD-10-CM

## 2018-08-01 RX ORDER — KETOROLAC TROMETHAMINE 30 MG/ML
60 INJECTION, SOLUTION INTRAMUSCULAR; INTRAVENOUS
Qty: 1 VIAL | Refills: 0 | Status: SHIPPED | COMMUNITY
Start: 2018-08-01 | End: 2018-08-01

## 2018-08-01 RX ORDER — TRIAMCINOLONE ACETONIDE 0.25 MG/G
CREAM TOPICAL 2 TIMES DAILY
Qty: 15 G | Refills: 5 | Status: SHIPPED | OUTPATIENT
Start: 2018-08-01 | End: 2019-01-25 | Stop reason: SDUPTHER

## 2018-08-01 RX ORDER — PEN NEEDLE, DIABETIC 32GX 5/32"
NEEDLE, DISPOSABLE MISCELLANEOUS
Refills: 0 | COMMUNITY
Start: 2018-06-14

## 2018-08-01 RX ORDER — GLUCOSAMINE SULFATE 1500 MG
POWDER IN PACKET (EA) ORAL
COMMUNITY
End: 2019-04-04

## 2018-08-01 RX ORDER — TIZANIDINE 4 MG/1
4 TABLET ORAL
Qty: 30 TAB | Refills: 1 | Status: SHIPPED | OUTPATIENT
Start: 2018-08-01 | End: 2018-09-25

## 2018-08-01 RX ORDER — PEN NEEDLE, DIABETIC 31 GX3/16"
NEEDLE, DISPOSABLE MISCELLANEOUS
COMMUNITY

## 2018-08-01 RX ORDER — OMEPRAZOLE 20 MG/1
20 CAPSULE, DELAYED RELEASE ORAL DAILY
Qty: 30 CAP | Refills: 0 | Status: SHIPPED | OUTPATIENT
Start: 2018-08-01 | End: 2018-08-01

## 2018-08-01 RX ORDER — MELOXICAM 15 MG/1
15 TABLET ORAL DAILY
Qty: 20 TAB | Refills: 0 | Status: SHIPPED | OUTPATIENT
Start: 2018-08-01 | End: 2018-09-25

## 2018-08-01 RX ORDER — BLOOD-GLUCOSE CONTROL, NORMAL
EACH MISCELLANEOUS
COMMUNITY

## 2018-08-01 NOTE — PROGRESS NOTES
HISTORY OF PRESENT ILLNESS  Jennifer Costa is a 63 y.o. female. Patient presents with acute lower back pain x 2 days,states she was lifting some fornitures two days ago and she felt the pain after that. States she has similar pain in the past. She rates pain as 8/10. She denies any urinary/bladder incontinence, dysuria,urinary frequency,abd/flank pain, numbness,tingling,decreased sensation,inability to walk, fever,chills, SOB,CP. She also complains of left shoulder pain x 2 weeks, states she had her left rotator cuff repair a couple of years ago. She denies any fall,trauma. Back Pain    The history is provided by the patient. This is a recurrent problem. The current episode started 2 days ago. The problem has not changed since onset. The problem occurs constantly. Patient reports not work related injury. The pain is associated with lifting. The pain is present in the lumbar spine. The pain is at a severity of 8/10. The symptoms are aggravated by bending. Pertinent negatives include no chest pain, no fever, no numbness, no weight loss, no headaches, no abdominal pain, no abdominal swelling, no bowel incontinence, no perianal numbness, no bladder incontinence, no dysuria, no pelvic pain, no leg pain, no paresthesias, no paresis and no tingling. She has tried nothing for the symptoms. Shoulder Pain    The history is provided by the patient. The left shoulder is affected. Pertinent negatives include no numbness, no muscle weakness and no tingling. Review of Systems   Constitutional: Negative for fever and weight loss. Cardiovascular: Negative for chest pain. Gastrointestinal: Negative for abdominal pain and bowel incontinence. Genitourinary: Negative for bladder incontinence, dysuria and pelvic pain. Musculoskeletal: Positive for back pain. Neurological: Negative for tingling, numbness, headaches and paresthesias. Physical Exam    ASSESSMENT and PLAN    ICD-10-CM ICD-9-CM    1.  Chronic midline low back pain without sciatica M54.5 724.2 tiZANidine (ZANAFLEX) 4 mg tablet    G89.29 338.29 ketorolac tromethamine (TORADOL) 60 mg/2 mL soln      meloxicam (MOBIC) 15 mg tablet   2. Eczema, unspecified type L30.9 692.9 triamcinolone acetonide (KENALOG) 0.025 % topical cream   3. Dyspepsia R10.13 536.8 DISCONTINUED: omeprazole (PRILOSEC) 20 mg capsule     Encounter Diagnoses   Name Primary?  Chronic midline low back pain without sciatica Yes    Eczema, unspecified type     Dyspepsia      Orders Placed This Encounter    Insulin Needles, Disposable, (BD ULTRA-FINE MINI PEN NEEDLE) 31 gauge x 316\" ndle    lancets (ONETOUCH DELICA LANCETS) 30 gauge misc    cholecalciferol (VITAMIN D3) 1,000 unit cap    EVELIA PEN NEEDLE 32 gauge x \" ndle    tiZANidine (ZANAFLEX) 4 mg tablet    triamcinolone acetonide (KENALOG) 0.025 % topical cream    ketorolac tromethamine (TORADOL) 60 mg/2 mL soln    meloxicam (MOBIC) 15 mg tablet    DISCONTD: omeprazole (PRILOSEC) 20 mg capsule     Orders Placed This Encounter    tiZANidine (ZANAFLEX) 4 mg tablet     Sig: Take 1 Tab by mouth two (2) times daily as needed. Indications: Muscle Spasm     Dispense:  30 Tab     Refill:  1    triamcinolone acetonide (KENALOG) 0.025 % topical cream     Sig: Apply  to affected area two (2) times a day. use thin layer     Dispense:  15 g     Refill:  5    ketorolac tromethamine (TORADOL) 60 mg/2 mL soln     Si mL by IntraMUSCular route now for 1 dose. Dispense:  1 Vial     Refill:  0     Order Specific Question:   Expiration Date     Answer:   2020     Order Specific Question:   Lot#     Answer:   [id number]     Order Specific Question:        Answer:   Eileen Estrada     Order Specific Question:   NDC#     Answer:   33901-544-60    meloxicam (MOBIC) 15 mg tablet     Sig: Take 1 Tab by mouth daily.      Dispense:  20 Tab     Refill:  0    DISCONTD: omeprazole (PRILOSEC) 20 mg capsule     Sig: Take 1 Cap by mouth daily.     Dispense:  30 Cap     Refill:  0     Orders Placed This Encounter    tiZANidine (ZANAFLEX) 4 mg tablet    triamcinolone acetonide (KENALOG) 0.025 % topical cream    ketorolac tromethamine (TORADOL) 60 mg/2 mL soln    meloxicam (MOBIC) 15 mg tablet    DISCONTD: omeprazole (PRILOSEC) 20 mg capsule     Diagnoses and all orders for this visit:    1. Chronic midline low back pain without sciatica  -     tiZANidine (ZANAFLEX) 4 mg tablet; Take 1 Tab by mouth two (2) times daily as needed. Indications: Muscle Spasm  -     ketorolac tromethamine (TORADOL) 60 mg/2 mL soln; 2 mL by IntraMUSCular route now for 1 dose. -     meloxicam (MOBIC) 15 mg tablet; Take 1 Tab by mouth daily. 2. Eczema, unspecified type  -     triamcinolone acetonide (KENALOG) 0.025 % topical cream; Apply  to affected area two (2) times a day. use thin layer    3. Dyspepsia      Follow-up Disposition:  Return in about 2 weeks (around 8/15/2018).   current treatment plan is effective, no change in therapy

## 2018-08-01 NOTE — PROGRESS NOTES
Patient presents with acute lower back pain x 2 days,states she was lifting some fornitures two days ago and she felt the pain after that. States she has similar pain in the past. She rates pain as 8/10. She denies any urinary/bladder incontinence, dysuria,urinary frequency,abd/flank pain, numbness,tingling,decreased sensation,inability to walk, fever,chills, SOB,CP. She also complains of left shoulder pain x 2 weeks, states she had her left rotator cuff repair a couple of years ago. She denies any fall,trauma.

## 2018-08-01 NOTE — PROGRESS NOTES
ROOM # 2    Sonal Camacho presents today for   Chief Complaint   Patient presents with    Back Pain     x2 days, pt stated just got off a trip and was lifting things and not sure if that is what causing it    Shoulder Pain     left shoulder pain s/p rotator cuff surgery       Sonal Camacho preferred language for health care discussion is english/other. Is someone accompanying this pt? no    Is the patient using any DME equipment during OV? no    Depression Screening:  PHQ over the last two weeks 12/14/2017 7/18/2017 3/16/2017 12/12/2016 10/13/2016 8/30/2016 8/16/2016   PHQ Not Done - Active Diagnosis of Depression or Bipolar Disorder Active Diagnosis of Depression or Bipolar Disorder - - Active Diagnosis of Depression or Bipolar Disorder Active Diagnosis of Depression or Bipolar Disorder   Little interest or pleasure in doing things Not at all Not at all - Several days Not at all - -   Feeling down, depressed, irritable, or hopeless Not at all Not at all - Several days Not at all - -   Total Score PHQ 2 0 0 - 2 0 - -       Learning Assessment:  Learning Assessment 11/24/2014   PRIMARY LEARNER Patient   HIGHEST LEVEL OF EDUCATION - PRIMARY LEARNER  2 YEARS [facility] PRIMARY LEARNER NONE   CO-LEARNER CAREGIVER No   PRIMARY LANGUAGE ENGLISH   LEARNER PREFERENCE PRIMARY DEMONSTRATION   ANSWERED BY self   RELATIONSHIP SELF       Abuse Screening:  No flowsheet data found. Fall Risk  No flowsheet data found. Health Maintenance reviewed and discussed per provider. Yes    Pt will discuss hm due with pcp      Advance Directive:  1. Do you have an advance directive in place? Patient Reply: no    2. If not, would you like material regarding how to put one in place? Patient Reply: no    Coordination of Care:  1. Have you been to the ER, urgent care clinic since your last visit? Hospitalized since your last visit? no    2.  Have you seen or consulted any other health care providers outside of the [facility] since your last visit? Include any pap smears or colon screening. no      After obtaining consent, and per orders of CARLY Benz, injection of Toradol 60mg/2ml given by Wei Ray LPN. Patient instructed to remain in clinic for 20 minutes afterwards, and to report any adverse reaction to me immediately.

## 2018-08-01 NOTE — PATIENT INSTRUCTIONS
Back Pain: Care Instructions  Your Care Instructions    Back pain has many possible causes. It is often related to problems with muscles and ligaments of the back. It may also be related to problems with the nerves, discs, or bones of the back. Moving, lifting, standing, sitting, or sleeping in an awkward way can strain the back. Sometimes you don't notice the injury until later. Arthritis is another common cause of back pain. Although it may hurt a lot, back pain usually improves on its own within several weeks. Most people recover in 12 weeks or less. Using good home treatment and being careful not to stress your back can help you feel better sooner. Follow-up care is a key part of your treatment and safety. Be sure to make and go to all appointments, and call your doctor if you are having problems. It's also a good idea to know your test results and keep a list of the medicines you take. How can you care for yourself at home? · Sit or lie in positions that are most comfortable and reduce your pain. Try one of these positions when you lie down:  ¨ Lie on your back with your knees bent and supported by large pillows. ¨ Lie on the floor with your legs on the seat of a sofa or chair. Kayla Chrystal on your side with your knees and hips bent and a pillow between your legs. ¨ Lie on your stomach if it does not make pain worse. · Do not sit up in bed, and avoid soft couches and twisted positions. Bed rest can help relieve pain at first, but it delays healing. Avoid bed rest after the first day of back pain. · Change positions every 30 minutes. If you must sit for long periods of time, take breaks from sitting. Get up and walk around, or lie in a comfortable position. · Try using a heating pad on a low or medium setting for 15 to 20 minutes every 2 or 3 hours. Try a warm shower in place of one session with the heating pad. · You can also try an ice pack for 10 to 15 minutes every 2 to 3 hours.  Put a thin cloth between the ice pack and your skin. · Take pain medicines exactly as directed. ¨ If the doctor gave you a prescription medicine for pain, take it as prescribed. ¨ If you are not taking a prescription pain medicine, ask your doctor if you can take an over-the-counter medicine. · Take short walks several times a day. You can start with 5 to 10 minutes, 3 or 4 times a day, and work up to longer walks. Walk on level surfaces and avoid hills and stairs until your back is better. · Return to work and other activities as soon as you can. Continued rest without activity is usually not good for your back. · To prevent future back pain, do exercises to stretch and strengthen your back and stomach. Learn how to use good posture, safe lifting techniques, and proper body mechanics. When should you call for help? Call your doctor now or seek immediate medical care if:    · You have new or worsening numbness in your legs.     · You have new or worsening weakness in your legs. (This could make it hard to stand up.)     · You lose control of your bladder or bowels.    Watch closely for changes in your health, and be sure to contact your doctor if:    · You have a fever, lose weight, or don't feel well.     · You do not get better as expected. Where can you learn more? Go to http://rm-shannon.info/. Enter C235 in the search box to learn more about \"Back Pain: Care Instructions. \"  Current as of: November 29, 2017  Content Version: 11.7  © 5435-2886 [employer]. Care instructions adapted under license by [employer] (which disclaims liability or warranty for this information). If you have questions about a medical condition or this instruction, always ask your healthcare professional. [place] [address] any warranty or liability for your use of this information.        Atopic Dermatitis: Care Instructions  Your Care Instructions  Atopic dermatitis (also called eczema) is a skin problem that causes intense itching and a red, raised rash. In severe cases, the rash develops clear fluid-filled blisters. The rash is not contagious. People with this condition seem to have very sensitive immune systems that are likely to react to things that cause allergies. The immune system is the body's way of fighting infection. There is no cure for atopic dermatitis, but you may be able to control it with care at home. Follow-up care is a key part of your treatment and safety. Be sure to make and go to all appointments, and call your doctor if you are having problems. It's also a good idea to know your test results and keep a list of the medicines you take. How can you care for yourself at home? · Use moisturizer at least twice a day. · If your doctor prescribes a cream, use it as directed. If your doctor prescribes other medicine, take it exactly as directed. · Wash the affected area with water only. Soap can make dryness and itching worse. Pat dry. · Apply a moisturizer after bathing. Use a cream such as Lubriderm, Moisturel, or Cetaphil that does not irritate the skin or cause a rash. Apply the cream while your skin is still damp after lightly drying with a towel. · Use cold, wet cloths to reduce itching. · Keep cool, and stay out of the sun. · If itching affects your normal activities, an over-the-counter antihistamine, such as diphenhydramine (Benadryl) or loratadine (Claritin) may help. Read and follow all instructions on the label. When should you call for help? Call your doctor now or seek immediate medical care if:    · Your rash gets worse and you have a fever.     · You have new blisters or bruises, or the rash spreads and looks like a sunburn.     · You have signs of infection, such as:  ¨ Increased pain, swelling, warmth, or redness. ¨ Red streaks leading from the rash. ¨ Pus draining from the rash.   ¨ A fever.     · You have crusting or oozing sores.     · You have joint aches or body aches along with your rash.    Watch closely for changes in your health, and be sure to contact your doctor if:    · Your rash does not clear up after 2 to 3 weeks of home treatment.     · Itching interferes with your sleep or daily activities. Where can you learn more? Go to http://rm-shannon.info/. Enter X777 in the search box to learn more about \"Atopic Dermatitis: Care Instructions. \"  Current as of: October 5, 2017  Content Version: 11.7  © 5236-0601 [employer]. Care instructions adapted under license by [employer] (which disclaims liability or warranty for this information). If you have questions about a medical condition or this instruction, always ask your healthcare professional. [address] any warranty or liability for your use of this information. Indigestion (Dyspepsia or Heartburn): Care Instructions  Your Care Instructions  Sometimes it can be hard to pinpoint the cause of indigestion. (It is also called dyspepsia or heartburn.) Most cases of an upset stomach with bloating, burning, burping, and nausea are minor and go away within several hours. Home treatment and over-the-counter medicine often are able to control symptoms. But if you take medicine to relieve your indigestion without making diet and lifestyle changes, your symptoms are likely to return again and again. If you get indigestion often, it may be a sign of a more serious medical problem. Be sure to follow up with your doctor, who may want to do tests to be sure of the cause of your indigestion. Follow-up care is a key part of your treatment and safety. Be sure to make and go to all appointments, and call your doctor if you are having problems. It's also a good idea to know your test results and keep a list of the medicines you take. How can you care for yourself at home? · Your doctor may recommend over-the-counter medicine.  For mild or occasional indigestion, antacids such as Gaviscon, Mylanta, Maalox, or Tums, may help. Be safe with medicines. Be careful when you take over-the-counter antacid medicines. Many of these medicines have aspirin in them. Read the label to make sure that you are not taking more than the recommended dose. Too much aspirin can be harmful. · Your doctor also may recommend over-the-counter acid reducers, such as Pepcid AC, Tagamet HB, Zantac 75, or Prilosec. Read and follow all instructions on the label. If you use these medicines often, talk with your doctor. · Change your eating habits. ¨ It's best to eat several small meals instead of two or three large meals. ¨ After you eat, wait 2 to 3 hours before you lie down. ¨ Chocolate, mint, and alcohol can make GERD worse. ¨ Spicy foods, foods that have a lot of acid (like tomatoes and oranges), and coffee can make GERD symptoms worse in some people. If your symptoms are worse after you eat a certain food, you may want to stop eating that food to see if your symptoms get better. · Do not smoke or chew tobacco. Smoking can make GERD worse. If you need help quitting, talk to your doctor about stop-smoking programs and medicines. These can increase your chances of quitting for good. · If you have GERD symptoms at night, raise the head of your bed 6 to 8 inches. You can do this by putting the frame on blocks or placing a foam wedge under the head of your mattress. (Adding extra pillows does not work.)  · Do not wear tight clothing around your middle. · Lose weight if you need to. Losing just 5 to 10 pounds can help. · Do not take anti-inflammatory medicines, such as aspirin, ibuprofen (Advil, Motrin), or naproxen (Aleve). These can irritate the stomach. If you need a pain medicine, try acetaminophen (Tylenol), which does not cause stomach upset. When should you call for help?   Call your doctor now or seek immediate medical care if:    · You have new or worse belly pain.     · You are vomiting.    Watch closely for changes in your health, and be sure to contact your doctor if:    · You have new or worse symptoms of indigestion.     · You have trouble or pain swallowing.     · You are losing weight.     · You do not get better as expected. Where can you learn more? Go to http://rm-shannon.info/. Enter K885 in the search box to learn more about \"Indigestion (Dyspepsia or Heartburn): Care Instructions. \"  Current as of: May 12, 2017  Content Version: 11.7  © 4443-4557 [employer]. Care instructions adapted under license by [employer] (which disclaims liability or warranty for this information). If you have questions about a medical condition or this instruction, always ask your healthcare professional. [address] any warranty or liability for your use of this information.

## 2018-08-16 ENCOUNTER — OFFICE VISIT (OUTPATIENT)
Dept: INTERNAL MEDICINE CLINIC | Age: 58
End: 2018-08-16

## 2018-08-16 VITALS
SYSTOLIC BLOOD PRESSURE: 109 MMHG | HEART RATE: 98 BPM | WEIGHT: 251.2 LBS | HEIGHT: 67 IN | OXYGEN SATURATION: 97 % | DIASTOLIC BLOOD PRESSURE: 72 MMHG | BODY MASS INDEX: 39.43 KG/M2 | TEMPERATURE: 98 F | RESPIRATION RATE: 20 BRPM

## 2018-08-16 DIAGNOSIS — M54.50 ACUTE BILATERAL LOW BACK PAIN WITHOUT SCIATICA: Primary | ICD-10-CM

## 2018-08-16 RX ORDER — OMEPRAZOLE 20 MG/1
CAPSULE, DELAYED RELEASE ORAL
Refills: 0 | COMMUNITY
Start: 2018-06-19 | End: 2019-06-14 | Stop reason: SDUPTHER

## 2018-08-16 NOTE — MR AVS SNAPSHOT
[facility] [place] 
 
 
 [address] [id number] 
[phone] Patient: Bre Childs MRN: [id number] Visit Information Date & Time Provider Department Dept. Phone Encounter #  
 8/16/2018 12:15 PM Michela Sanches [facility] [phone] [id number] Follow-up Instructions Return if symptoms worsen or fail to improve, for or as appointed. Corinne Agrawal Your Appointments 10/5/2018 11:00 AM  
Office Visit with Michela Sanches MD  
[facility]) Appt Note: 6 month f/u HTN/DM; chol  
 [address]  
  
   
 [address] [facility] Upcoming Health Maintenance Date Due Pneumococcal 19-64 Medium Risk (1 of 1 - PPSV23) 6/19/1980 Influenza Age 5 to Adult 8/1/2018 HEMOGLOBIN A1C Q6M 10/5/2018 MICROALBUMIN Q1 12/4/2018 LIPID PANEL Q1 12/4/2018 FOOT EXAM Q1 12/28/2018 EYE EXAM RETINAL OR DILATED Q1 1/15/2019 MEDICARE YEARLY EXAM 4/6/2019 BREAST CANCER SCRN MAMMOGRAM 12/27/2019 PAP AKA CERVICAL CYTOLOGY 8/30/2020 DTaP/Tdap/Td series (2 - Td) 11/1/2022 COLONOSCOPY 4/1/2024 Allergies as of 8/16/2018  Review Complete On: 8/16/2018 By: Michela Sanches MD  
  
 Severity Noted Reaction Type Reactions Digoxin  11/24/2014   Side Effect Nausea Only Niaspan [Niacin]  10/31/2011   Side Effect Rash Wellbutrin [Bupropion Hcl]  11/24/2014   Side Effect Itching Current Immunizations  Reviewed on 9/27/2013 Name Date Influenza Vaccine 9/27/2013  3:46 PM  
 Influenza Vaccine (Quad) PF 10/17/2017, 10/13/2016, 12/17/2015  2:37 PM  
 Influenza Vaccine PF 10/20/2014 Influenza Vaccine Split 11/1/2012  9:46 AM  
 Pneumococcal Conjugate (PCV-13) 12/12/2016 TDAP Vaccine 11/1/2012  9:45 AM  
  
 Not reviewed this visit You Were Diagnosed With   
 Codes Comments Acute bilateral low back pain without sciatica    -  Primary ICD-10-CM: M54.5 ICD-9-CM: 724.2, 338.19 Vitals BP Pulse Temp Resp Height(growth percentile) Weight(growth percentile) 109/72 (BP 1 Location: Right arm, BP Patient Position: Sitting) 98 98 °F (36.7 °C) (Oral) 20 5' 7\" (1.702 m) 251 lb 3.2 oz (113.9 kg) SpO2 BMI OB Status Smoking Status 97% 39.34 kg/m2 Postmenopausal Never Smoker Vitals History BMI and BSA Data Body Mass Index Body Surface Area  
 39.34 kg/m 2 2.32 m 2 Preferred Pharmacy Pharmacy Name Phone [facility] [address] [address]  [facility] [phone] Your Updated Medication List  
  
   
This list is accurate as of 8/16/18 12:37 PM.  Always use your most recent med list.  
  
  
  
  
 AMITIZA 8 mcg capsule Generic drug:  lubiPROStone  
take 2 capsules by mouth twice a day with meals  
  
 aspirin 81 mg chewable tablet Take 81 mg by mouth daily. * BD ULTRA-FINE MINI PEN NEEDLE 31 gauge x 3/16\" Ndle Generic drug:  Insulin Needles (Disposable) BD Ultra-Fine Mini Pen Needle 31 gauge x 3/16\" BD ULTRA-FINE MINI PEN NEEDLE 31 gauge x 3/16\" Ndle Generic drug:  Insulin Needles (Disposable) * Anaid Pen Needle 32 gauge x 5/32\" Ndle Generic drug:  Insulin Needles (Disposable)  
inject once daily buPROPion  mg tablet Commonly known as:  WELLBUTRIN XL  
take 1 tablet by mouth once daily CALCIUM 600 + D(3) PO Take 1 Cap by mouth daily. carvedilol 12.5 mg tablet Commonly known as:  COREG  
take 1 tablet by mouth twice a day  
  
 cetirizine-psuedoePHEDrine 5-120 mg per tablet Commonly known as:  ZyrTEC-D Take 1 Tab by mouth two (2) times a day. cholecalciferol 1,000 unit Cap Commonly known as:  VITAMIN D3 Take by oral route. clonazePAM 0.5 mg tablet Commonly known as:  Lennon Altes Take  by mouth two (2) times a day. COQ10  100-100 mg-unit Cap Generic drug:  coenzyme q10-vitamin e Take  by mouth two (2) times a day. CRESTOR 40 mg tablet Generic drug:  rosuvastatin Take 40 mg by mouth daily. NON FORMULARY DULoxetine 60 mg capsule Commonly known as:  CYMBALTA  
120 mg daily. enalapril 20 mg tablet Commonly known as:  VASOTEC  
take 1 tablet by mouth once daily  
  
 estradiol 0.01 % (0.1 mg/gram) vaginal cream  
Commonly known as:  ESTRACE Insert 2 g into vagina daily. Apply fingertip amount to outside of vaginal opening. HYDROcodone-acetaminophen 5-300 mg tablet Commonly known as:  Daphane Isac  
take 1 tablet by mouth every 4 to 6 hours if needed  
  
 hydrOXYzine HCl 50 mg tablet Commonly known as:  ATARAX  
take 1 tablet by mouth every 12 hours LOFIBRA 134 mg capsule Generic drug:  fenofibrate micronized Take  by mouth every morning. meloxicam 15 mg tablet Commonly known as:  MOBIC Take 1 Tab by mouth daily. metFORMIN  mg tablet Commonly known as:  GLUCOPHAGE XR  
  
 MIRALAX 17 gram/dose powder Generic drug:  polyethylene glycol Take 17 g by mouth daily. multivitamin tablet Commonly known as:  ONE A DAY Take 1 Tab by mouth daily. NYAMYC powder Generic drug:  nystatin APPLY THIN LAYER UNDER BREASTS TWICE A DAY AS NEED FOR FLARE  
  
 omeprazole 20 mg capsule Commonly known as:  PRILOSEC  
  
 ONETOUCH DELICA LANCETS [address] Generic drug:  lancets OneTouch Delica Lancets 30 gauge  
  
 spironolactone 25 mg tablet Commonly known as:  ALDACTONE Take 25 mg by mouth daily. SUMAtriptan succinate 6 mg/0.5 mL kit Commonly known as:  IMITREX STATDOSE PEN  
6 mg by SubCUTAneous route once as needed for Migraine. tiZANidine 4 mg tablet Commonly known as:  [place] Mule Take 1 Tab by mouth two (2) times daily as needed.  Indications: Muscle Spasm  
  
 triamcinolone acetonide 0.025 % topical cream  
 Commonly known as:  KENALOG Apply  to affected area two (2) times a day. use thin layer TRULICITY 1.5 WT/3.7 mL sub-q pen Generic drug:  dulaglutide 1.5 mg by SubCUTAneous route every seven (7) days. VICTOZA 3-KIRK 0.6 mg/0.1 mL (18 mg/3 mL) Pnij Generic drug:  Liraglutide  
inject 0.6 milligrams subcutaneously daily for 7 days then 1.2 mi...  (REFER TO PRESCRIPTION NOTES). * Notice: This list has 2 medication(s) that are the same as other medications prescribed for you. Read the directions carefully, and ask your doctor or other care provider to review them with you. Follow-up Instructions Return if symptoms worsen or fail to improve, for or as appointed. Tenisha Pierson Patient Instructions Learning About How to Have a Healthy Back What causes back pain? Back pain is often caused by overuse, strain, or injury. For example, people often hurt their backs playing sports or working in the yard, being jolted in a car accident, or lifting something too heavy. Aging plays a part too. Your bones and muscles tend to lose strength as you age, which makes injury more likely. The spongy discs between the bones of the spine (vertebrae) may suffer from wear and tear and no longer provide enough cushion between the bones. A disc that bulges or breaks open (herniated disc) can press on nerves, causing back pain. In some people, back pain is the result of arthritis, broken vertebrae caused by bone loss (osteoporosis), illness, or a spine problem. Although most people have back pain at one time or another, there are steps you can take to make it less likely. How can you have a healthy back? Reduce stress on your back through good posture Slumping or slouching alone may not cause low back pain. But after the back has been strained or injured, bad posture can make pain worse.  
· Sleep in a position that maintains your back's normal curves and on a mattress that feels comfortable. Sleep on your side with a pillow between your knees, or sleep on your back with a pillow under your knees. These positions can reduce strain on your back. · Stand and sit up straight. \"Good posture\" generally means your ears, shoulders, and hips are in a straight line. · If you must stand for a long time, put one foot on a stool, ledge, or box. Switch feet every now and then. · Sit in a chair that is low enough to let you place both feet flat on the floor with both knees nearly level with your hips. If your chair or desk is too high, use a footrest to raise your knees. Place a small pillow, a rolled-up towel, or a lumbar roll in the curve of your back if you need extra support. · Try a kneeling chair, which helps tilt your hips forward. This takes pressure off your lower back. · Try sitting on an exercise ball. It can rock from side to side, which helps keep your back loose. · When driving, keep your knees nearly level with your hips. Sit straight, and drive with both hands on the steering wheel. Your arms should be in a slightly bent position. Reduce stress on your back through careful lifting · Squat down, bending at the hips and knees only. If you need to, put one knee to the floor and extend your other knee in front of you, bent at a right angle (half kneeling). · Press your chest straight forward. This helps keep your upper back straight while keeping a slight arch in your low back. · Hold the load as close to your body as possible, at the level of your belly button (navel). · Use your feet to change direction, taking small steps. · Lead with your hips as you change direction. Keep your shoulders in line with your hips as you move. · Set down your load carefully, squatting with your knees and hips only. Exercise and stretch your back · Do some exercise on most days of the week, if your doctor says it is okay. You can walk, run, swim, or cycle. · Stretch your back muscles. Here are a few exercises to try: ¨ Lie on your back, and gently pull one bent knee to your chest. Put that foot back on the floor, and then pull the other knee to your chest. 
¨ Do pelvic tilts. Lie on your back with your knees bent. Tighten your stomach muscles. Pull your belly button (navel) in and up toward your ribs. You should feel like your back is pressing to the floor and your hips and pelvis are slightly lifting off the floor. Hold for 6 seconds while breathing smoothly. ¨ Sit with your back flat against a wall. · Keep your core muscles strong. The muscles of your back, belly (abdomen), and buttocks support your spine. ¨ Pull in your belly and imagine pulling your navel toward your spine. Hold this for 6 seconds, then relax. Remember to keep breathing normally as you tense your muscles. ¨ Do curl-ups. Always do them with your knees bent. Keep your low back on the floor, and curl your shoulders toward your knees using a smooth, slow motion. Keep your arms folded across your chest. If this bothers your neck, try putting your hands behind your neck (not your head), with your elbows spread apart. ¨ Lie on your back with your knees bent and your feet flat on the floor. Tighten your belly muscles, and then push with your feet and raise your buttocks up a few inches. Hold this position 6 seconds as you continue to breathe normally, then lower yourself slowly to the floor. Repeat 8 to 12 times. ¨ If you like group exercise, try Pilates or yoga. These classes have poses that strengthen the core muscles. Lead a healthy lifestyle · Stay at a healthy weight to avoid strain on your back. · Do not smoke. Smoking increases the risk of osteoporosis, which weakens the spine. If you need help quitting, talk to your doctor about stop-smoking programs and medicines. These can increase your chances of quitting for good. Where can you learn more? Go to http://rm-shannon.info/. Enter L315 in the search box to learn more about \"Learning About How to Have a Healthy Back. \" Current as of: November 29, 2017 Content Version: 11.7 © 6050-3007 [employer]. Care instructions adapted under license by [email or web address] (which disclaims liability or warranty for this information). If you have questions about a medical condition or this instruction, always ask your healthcare professional. [address] any warranty or liability for your use of this information. Low Back Pain: Exercises Your Care Instructions Here are some examples of typical rehabilitation exercises for your condition. Start each exercise slowly. Ease off the exercise if you start to have pain. Your doctor or physical therapist will tell you when you can start these exercises and which ones will work best for you. How to do the exercises Press-up 1. Lie on your stomach, supporting your body with your forearms. 2. Press your elbows down into the floor to raise your upper back. As you do this, relax your stomach muscles and allow your back to arch without using your back muscles. As your press up, do not let your hips or pelvis come off the floor. 3. Hold for 15 to 30 seconds, then relax. 4. Repeat 2 to 4 times. Alternate arm and leg (bird dog) exercise Do this exercise slowly. Try to keep your body straight at all times, and do not let one hip drop lower than the other. 1. Start on the floor, on your hands and knees. 2. Tighten your belly muscles. 3. Raise one leg off the floor, and hold it straight out behind you. Be careful not to let your hip drop down, because that will twist your trunk. 4. Hold for about 6 seconds, then lower your leg and switch to the other leg. 5. Repeat 8 to 12 times on each leg. 6. Over time, work up to holding for 10 to 30 seconds each time. 7. If you feel stable and secure with your leg raised, try raising the opposite arm straight out in front of you at the same time. Knee-to-chest exercise 1. Lie on your back with your knees bent and your feet flat on the floor. 2. Bring one knee to your chest, keeping the other foot flat on the floor (or keeping the other leg straight, whichever feels better on your lower back). 3. Keep your lower back pressed to the floor. Hold for at least 15 to 30 seconds. 4. Relax, and lower the knee to the starting position. 5. Repeat with the other leg. Repeat 2 to 4 times with each leg. 6. To get more stretch, put your other leg flat on the floor while pulling your knee to your chest. 
Curl-ups 1. Lie on the floor on your back with your knees bent at a 90-degree angle. Your feet should be flat on the floor, about 12 inches from your buttocks. 2. Cross your arms over your chest. If this bothers your neck, try putting your hands behind your neck (not your head), with your elbows spread apart. 3. Slowly tighten your belly muscles and raise your shoulder blades off the floor. 4. Keep your head in line with your body, and do not press your chin to your chest. 
5. Hold this position for 1 or 2 seconds, then slowly lower yourself back down to the floor. 6. Repeat 8 to 12 times. Pelvic tilt exercise 1. Lie on your back with your knees bent. 2. \"Brace\" your stomach. This means to tighten your muscles by pulling in and imagining your belly button moving toward your spine. You should feel like your back is pressing to the floor and your hips and pelvis are rocking back. 3. Hold for about 6 seconds while you breathe smoothly. 4. Repeat 8 to 12 times. Heel dig bridging 1. Lie on your back with both knees bent and your ankles bent so that only your heels are digging into the floor. Your knees should be bent about 90 degrees.  
2. Then push your heels into the floor, squeeze your buttocks, and lift your hips off the floor until your shoulders, hips, and knees are all in a straight line. 3. Hold for about 6 seconds as you continue to breathe normally, and then slowly lower your hips back down to the floor and rest for up to 10 seconds. 4. Do 8 to 12 repetitions. Hamstring stretch in doorway 1. Lie on your back in a doorway, with one leg through the open door. 2. Slide your leg up the wall to straighten your knee. You should feel a gentle stretch down the back of your leg. 3. Hold the stretch for at least 15 to 30 seconds. Do not arch your back, point your toes, or bend either knee. Keep one heel touching the floor and the other heel touching the wall. 4. Repeat with your other leg. 5. Do 2 to 4 times for each leg. Hip flexor stretch 1. Kneel on the floor with one knee bent and one leg behind you. Place your forward knee over your foot. Keep your other knee touching the floor. 2. Slowly push your hips forward until you feel a stretch in the upper thigh of your rear leg. 3. Hold the stretch for at least 15 to 30 seconds. Repeat with your other leg. 4. Do 2 to 4 times on each side. Wall sit 1. Stand with your back 10 to 12 inches away from a wall. 2. Lean into the wall until your back is flat against it. 3. Slowly slide down until your knees are slightly bent, pressing your lower back into the wall. 4. Hold for about 6 seconds, then slide back up the wall. 5. Repeat 8 to 12 times. Follow-up care is a key part of your treatment and safety. Be sure to make and go to all appointments, and call your doctor if you are having problems. It's also a good idea to know your test results and keep a list of the medicines you take. Where can you learn more? Go to http://rm-shannon.info/. Enter E972 in the search box to learn more about \"Low Back Pain: Exercises. \" Current as of: November 29, 2017 Content Version: 11.7 © 8599-5475 [employer]. Care instructions adapted under license by [employer] (which disclaims liability or warranty for this information). If you have questions about a medical condition or this instruction, always ask your healthcare professional. [address] any warranty or liability for your use of this information. Introducing [facility]! Dear Katlin Barnett: Thank you for requesting a [employer] account. Our records indicate that you already have an active [employer] account. You can access your account anytime at [email or web address] Did you know that you can access your hospital and ER discharge instructions at any time in [employer]? You can also review all of your test results from your hospital stay or ER visit. Additional Information If you have questions, please visit the Frequently Asked Questions section of the [employer] website at [email or web address] Remember, [employer] is NOT to be used for urgent needs. For medical emergencies, dial 911. Now available from your iPhone and Android! Please provide this summary of care documentation to your next provider. Your primary care clinician is listed as [facility]. If you have any questions after today's visit, please call [phone].

## 2018-08-16 NOTE — PROGRESS NOTES
HISTORY OF PRESENT ILLNESS  Aguilar Massey is a 63 y.o. female. Visit Vitals    /72 (BP 1 Location: Right arm, BP Patient Position: Sitting)    Pulse 98    Temp 98 °F (36.7 °C) (Oral)    Resp 20    Ht 5' 7\" (1.702 m)    Wt 251 lb 3.2 oz (113.9 kg)    SpO2 97%    BMI 39.34 kg/m2       HPI Comments: See last note from NP on Aug 1,    She had been out of town picking up luggage. She thinks this is what started it. She reports about 50% better. Back Pain    The history is provided by the patient. This is a chronic problem. The current episode started more than 1 week ago. The problem has been gradually improving. The problem occurs daily. Review of Systems   Musculoskeletal: Positive for back pain. Physical Exam   Constitutional: She is oriented to person, place, and time. She appears well-developed and well-nourished. No distress. Cardiovascular: Normal rate. Pulmonary/Chest: Effort normal.   Musculoskeletal:   Fair flexion and extension of lumbar. Some lower lordosis. Some mild muscle tightness. Neurological: She is alert and oriented to person, place, and time. nml strength in legs  Neg SLRs from sitting   Skin: Skin is warm and dry. She is not diaphoretic. Nursing note and vitals reviewed. ASSESSMENT and PLAN    ICD-10-CM ICD-9-CM    1. Acute bilateral low back pain without sciatica M54.5 724.2      338.19        Back pain resolving  Discussed PT if sxs worsen    No need for imaging at this point  Continue same    Add stretches. F/u as appointed in October.  Sooner if needed (for pre-op)

## 2018-08-16 NOTE — PATIENT INSTRUCTIONS
Learning About How to Have a Healthy Back  What causes back pain? Back pain is often caused by overuse, strain, or injury. For example, people often hurt their backs playing sports or working in the yard, being jolted in a car accident, or lifting something too heavy. Aging plays a part too. Your bones and muscles tend to lose strength as you age, which makes injury more likely. The spongy discs between the bones of the spine (vertebrae) may suffer from wear and tear and no longer provide enough cushion between the bones. A disc that bulges or breaks open (herniated disc) can press on nerves, causing back pain. In some people, back pain is the result of arthritis, broken vertebrae caused by bone loss (osteoporosis), illness, or a spine problem. Although most people have back pain at one time or another, there are steps you can take to make it less likely. How can you have a healthy back? Reduce stress on your back through good posture  Slumping or slouching alone may not cause low back pain. But after the back has been strained or injured, bad posture can make pain worse. · Sleep in a position that maintains your back's normal curves and on a mattress that feels comfortable. Sleep on your side with a pillow between your knees, or sleep on your back with a pillow under your knees. These positions can reduce strain on your back. · Stand and sit up straight. \"Good posture\" generally means your ears, shoulders, and hips are in a straight line. · If you must stand for a long time, put one foot on a stool, ledge, or box. Switch feet every now and then. · Sit in a chair that is low enough to let you place both feet flat on the floor with both knees nearly level with your hips. If your chair or desk is too high, use a footrest to raise your knees. Place a small pillow, a rolled-up towel, or a lumbar roll in the curve of your back if you need extra support.   · Try a kneeling chair, which helps tilt your hips forward. This takes pressure off your lower back. · Try sitting on an exercise ball. It can rock from side to side, which helps keep your back loose. · When driving, keep your knees nearly level with your hips. Sit straight, and drive with both hands on the steering wheel. Your arms should be in a slightly bent position. Reduce stress on your back through careful lifting  · Squat down, bending at the hips and knees only. If you need to, put one knee to the floor and extend your other knee in front of you, bent at a right angle (half kneeling). · Press your chest straight forward. This helps keep your upper back straight while keeping a slight arch in your low back. · Hold the load as close to your body as possible, at the level of your belly button (navel). · Use your feet to change direction, taking small steps. · Lead with your hips as you change direction. Keep your shoulders in line with your hips as you move. · Set down your load carefully, squatting with your knees and hips only. Exercise and stretch your back  · Do some exercise on most days of the week, if your doctor says it is okay. You can walk, run, swim, or cycle. · Stretch your back muscles. Here are a few exercises to try:  Jocelyn Vázquez on your back, and gently pull one bent knee to your chest. Put that foot back on the floor, and then pull the other knee to your chest.  ¨ Do pelvic tilts. Lie on your back with your knees bent. Tighten your stomach muscles. Pull your belly button (navel) in and up toward your ribs. You should feel like your back is pressing to the floor and your hips and pelvis are slightly lifting off the floor. Hold for 6 seconds while breathing smoothly. ¨ Sit with your back flat against a wall. · Keep your core muscles strong. The muscles of your back, belly (abdomen), and buttocks support your spine. ¨ Pull in your belly and imagine pulling your navel toward your spine. Hold this for 6 seconds, then relax.  Remember to keep breathing normally as you tense your muscles. ¨ Do curl-ups. Always do them with your knees bent. Keep your low back on the floor, and curl your shoulders toward your knees using a smooth, slow motion. Keep your arms folded across your chest. If this bothers your neck, try putting your hands behind your neck (not your head), with your elbows spread apart. ¨ Lie on your back with your knees bent and your feet flat on the floor. Tighten your belly muscles, and then push with your feet and raise your buttocks up a few inches. Hold this position 6 seconds as you continue to breathe normally, then lower yourself slowly to the floor. Repeat 8 to 12 times. ¨ If you like group exercise, try Pilates or yoga. These classes have poses that strengthen the core muscles. Lead a healthy lifestyle  · Stay at a healthy weight to avoid strain on your back. · Do not smoke. Smoking increases the risk of osteoporosis, which weakens the spine. If you need help quitting, talk to your doctor about stop-smoking programs and medicines. These can increase your chances of quitting for good. Where can you learn more? Go to http://rmIwebalizeshannon.info/. Enter L315 in the search box to learn more about \"Learning About How to Have a Healthy Back. \"  Current as of: November 29, 2017  Content Version: 11.7  © 4230-3522 [employer]. Care instructions adapted under license by [employer] (which disclaims liability or warranty for this information). If you have questions about a medical condition or this instruction, always ask your healthcare professional. [place] [address] any warranty or liability for your use of this information. Low Back Pain: Exercises  Your Care Instructions  Here are some examples of typical rehabilitation exercises for your condition. Start each exercise slowly. Ease off the exercise if you start to have pain.   Your doctor or physical therapist will tell you when you can start these exercises and which ones will work best for you. How to do the exercises  Press-up    1. Lie on your stomach, supporting your body with your forearms. 2. Press your elbows down into the floor to raise your upper back. As you do this, relax your stomach muscles and allow your back to arch without using your back muscles. As your press up, do not let your hips or pelvis come off the floor. 3. Hold for 15 to 30 seconds, then relax. 4. Repeat 2 to 4 times. Alternate arm and leg (bird dog) exercise    Do this exercise slowly. Try to keep your body straight at all times, and do not let one hip drop lower than the other. 1. Start on the floor, on your hands and knees. 2. Tighten your belly muscles. 3. Raise one leg off the floor, and hold it straight out behind you. Be careful not to let your hip drop down, because that will twist your trunk. 4. Hold for about 6 seconds, then lower your leg and switch to the other leg. 5. Repeat 8 to 12 times on each leg. 6. Over time, work up to holding for 10 to 30 seconds each time. 7. If you feel stable and secure with your leg raised, try raising the opposite arm straight out in front of you at the same time. Knee-to-chest exercise    1. Lie on your back with your knees bent and your feet flat on the floor. 2. Bring one knee to your chest, keeping the other foot flat on the floor (or keeping the other leg straight, whichever feels better on your lower back). 3. Keep your lower back pressed to the floor. Hold for at least 15 to 30 seconds. 4. Relax, and lower the knee to the starting position. 5. Repeat with the other leg. Repeat 2 to 4 times with each leg. 6. To get more stretch, put your other leg flat on the floor while pulling your knee to your chest.  Curl-ups    1. Lie on the floor on your back with your knees bent at a 90-degree angle. Your feet should be flat on the floor, about 12 inches from your buttocks.   2. Cross your arms over your chest. If this bothers your neck, try putting your hands behind your neck (not your head), with your elbows spread apart. 3. Slowly tighten your belly muscles and raise your shoulder blades off the floor. 4. Keep your head in line with your body, and do not press your chin to your chest.  5. Hold this position for 1 or 2 seconds, then slowly lower yourself back down to the floor. 6. Repeat 8 to 12 times. Pelvic tilt exercise    1. Lie on your back with your knees bent. 2. \"Brace\" your stomach. This means to tighten your muscles by pulling in and imagining your belly button moving toward your spine. You should feel like your back is pressing to the floor and your hips and pelvis are rocking back. 3. Hold for about 6 seconds while you breathe smoothly. 4. Repeat 8 to 12 times. Heel dig bridging    1. Lie on your back with both knees bent and your ankles bent so that only your heels are digging into the floor. Your knees should be bent about 90 degrees. 2. Then push your heels into the floor, squeeze your buttocks, and lift your hips off the floor until your shoulders, hips, and knees are all in a straight line. 3. Hold for about 6 seconds as you continue to breathe normally, and then slowly lower your hips back down to the floor and rest for up to 10 seconds. 4. Do 8 to 12 repetitions. Hamstring stretch in doorway    1. Lie on your back in a doorway, with one leg through the open door. 2. Slide your leg up the wall to straighten your knee. You should feel a gentle stretch down the back of your leg. 3. Hold the stretch for at least 15 to 30 seconds. Do not arch your back, point your toes, or bend either knee. Keep one heel touching the floor and the other heel touching the wall. 4. Repeat with your other leg. 5. Do 2 to 4 times for each leg. Hip flexor stretch    1. Kneel on the floor with one knee bent and one leg behind you. Place your forward knee over your foot.  Keep your other knee touching the floor. 2. Slowly push your hips forward until you feel a stretch in the upper thigh of your rear leg. 3. Hold the stretch for at least 15 to 30 seconds. Repeat with your other leg. 4. Do 2 to 4 times on each side. Wall sit    1. Stand with your back 10 to 12 inches away from a wall. 2. Lean into the wall until your back is flat against it. 3. Slowly slide down until your knees are slightly bent, pressing your lower back into the wall. 4. Hold for about 6 seconds, then slide back up the wall. 5. Repeat 8 to 12 times. Follow-up care is a key part of your treatment and safety. Be sure to make and go to all appointments, and call your doctor if you are having problems. It's also a good idea to know your test results and keep a list of the medicines you take. Where can you learn more? Go to http://rm-shannon.info/. Enter I490 in the search box to learn more about \"Low Back Pain: Exercises. \"  Current as of: November 29, 2017  Content Version: 11.7  © 3266-0170 [employer]. Care instructions adapted under license by [employer] (which disclaims liability or warranty for this information). If you have questions about a medical condition or this instruction, always ask your healthcare professional. [address] any warranty or liability for your use of this information.

## 2018-08-16 NOTE — PROGRESS NOTES
ROOM # 4    Lizbeth Bucio presents today for   Chief Complaint   Patient presents with    Back Pain     2w f/u       Lizbeth Bucio preferred language for health care discussion is english/other. Is someone accompanying this pt? no    Is the patient using any DME equipment during OV? no    Depression Screening:  PHQ over the last two weeks 12/14/2017 7/18/2017 3/16/2017 12/12/2016 10/13/2016 8/30/2016 8/16/2016   PHQ Not Done - Active Diagnosis of Depression or Bipolar Disorder Active Diagnosis of Depression or Bipolar Disorder - - Active Diagnosis of Depression or Bipolar Disorder Active Diagnosis of Depression or Bipolar Disorder   Little interest or pleasure in doing things Not at all Not at all - Several days Not at all - -   Feeling down, depressed, irritable, or hopeless Not at all Not at all - Several days Not at all - -   Total Score PHQ 2 0 0 - 2 0 - -       Learning Assessment:  Learning Assessment 11/24/2014   PRIMARY LEARNER Patient   HIGHEST LEVEL OF EDUCATION - PRIMARY LEARNER  2 YEARS OF COLLEGE   BARRIERS PRIMARY LEARNER NONE   CO-LEARNER CAREGIVER No   PRIMARY LANGUAGE ENGLISH   LEARNER PREFERENCE PRIMARY DEMONSTRATION   ANSWERED BY self   RELATIONSHIP SELF         Health Maintenance reviewed and discussed per provider. Yes    Lizbeth Bucio is due for   Health Maintenance Due   Topic Date Due    Pneumococcal 19-64 Medium Risk (1 of 1 - PPSV23) 06/19/1980    Influenza Age 5 to Adult  08/01/2018     Please order/place referral if appropriate. Advance Directive:  1. Do you have an advance directive in place? Patient Reply: no    2. If not, would you like material regarding how to put one in place? Patient Reply: no    Coordination of Care:  1. Have you been to the ER, urgent care clinic since your last visit? Hospitalized since your last visit? no    2.  Have you seen or consulted any other health care providers outside of the [facility] since your last visit? Include any pap smears or colon screening.  no

## 2018-09-25 ENCOUNTER — OFFICE VISIT (OUTPATIENT)
Dept: INTERNAL MEDICINE CLINIC | Age: 58
End: 2018-09-25

## 2018-09-25 VITALS
TEMPERATURE: 96.4 F | OXYGEN SATURATION: 98 % | SYSTOLIC BLOOD PRESSURE: 110 MMHG | HEIGHT: 67 IN | WEIGHT: 257.8 LBS | BODY MASS INDEX: 40.46 KG/M2 | DIASTOLIC BLOOD PRESSURE: 70 MMHG | RESPIRATION RATE: 18 BRPM | HEART RATE: 89 BPM

## 2018-09-25 DIAGNOSIS — Z01.818 PRE-OP EXAM: Primary | ICD-10-CM

## 2018-09-25 DIAGNOSIS — I10 ESSENTIAL HYPERTENSION: Chronic | ICD-10-CM

## 2018-09-25 DIAGNOSIS — M17.12 PRIMARY OSTEOARTHRITIS OF LEFT KNEE: ICD-10-CM

## 2018-09-25 DIAGNOSIS — E11.9 TYPE 2 DIABETES MELLITUS WITHOUT COMPLICATION, WITHOUT LONG-TERM CURRENT USE OF INSULIN (HCC): Chronic | ICD-10-CM

## 2018-09-25 NOTE — PROGRESS NOTES
ROOM # 4    Sherryn Holter presents today for   Chief Complaint   Patient presents with    Pre-op Exam     R TKR       Sherryn Holter preferred language for health care discussion is english/other. Is someone accompanying this pt? no    Is the patient using any DME equipment during OV? no    Depression Screening:  PHQ over the last two weeks 9/25/2018 12/14/2017 7/18/2017 3/16/2017 12/12/2016 10/13/2016 8/30/2016   PHQ Not Done - - Active Diagnosis of Depression or Bipolar Disorder Active Diagnosis of Depression or Bipolar Disorder - - Active Diagnosis of Depression or Bipolar Disorder   Little interest or pleasure in doing things Not at all Not at all Not at all - Several days Not at all -   Feeling down, depressed, irritable, or hopeless Several days Not at all Not at all - Several days Not at all -   Total Score PHQ 2 1 0 0 - 2 0 -       Learning Assessment:  Learning Assessment 11/24/2014   PRIMARY LEARNER Patient   HIGHEST LEVEL OF EDUCATION - PRIMARY LEARNER  2 YEARS OF COLLEGE   BARRIERS PRIMARY LEARNER NONE   CO-LEARNER CAREGIVER No   PRIMARY LANGUAGE ENGLISH   LEARNER PREFERENCE PRIMARY DEMONSTRATION   ANSWERED BY self   RELATIONSHIP SELF         Health Maintenance reviewed and discussed per provider. Yes    Sherryn Holter is due for   Health Maintenance Due   Topic Date Due    Pneumococcal 19-64 Medium Risk (1 of 1 - PPSV23) 06/19/1980    Shingrix Vaccine Age 50> (1 of 2) 06/19/2011    Influenza Age 5 to Adult  08/01/2018    HEMOGLOBIN A1C Q6M  10/05/2018     Please order/place referral if appropriate. Advance Directive:  1. Do you have an advance directive in place? Patient Reply: no    2. If not, would you like material regarding how to put one in place? Patient Reply: no    Coordination of Care:  1. Have you been to the ER, urgent care clinic since your last visit? Hospitalized since your last visit? no    2.  Have you seen or consulted any other health care providers outside of the [address] [place] [address] since your last visit? Include any pap smears or colon screening.  no

## 2018-09-25 NOTE — PROGRESS NOTES
HISTORY OF PRESENT ILLNESS  Michela Figueroa is a 63 y.o. female. Visit Vitals    /70 (BP 1 Location: Right arm, BP Patient Position: Sitting)    Pulse 89    Temp 96.4 °F (35.8 °C) (Oral)    Resp 18    Ht 5' 7\" (1.702 m)    Wt 257 lb 12.8 oz (116.9 kg)    SpO2 98%    BMI 40.38 kg/m2       HPI Comments: Here for pre-op for left total knee replacement on 9/8/19 at [facility]     No previous anesthesia problems    Saw Dr. Garcia recently and had a stress test.  Her last stress test a year ago was fine. Knee limits her walking  Knee limits her stair climbing  Does not get SOB or chest pain during normal daily routine. Does her laundry, shopping w/o assistance. Does her housework. Can move furniture if needed.             Past Medical History:  3/10/2011: Abnormal bone density screening  1996: Anemia  No date: Arthritis  No date: Baker's cyst      Comment: left  No date: Cardiomyopathy [facility])  1999: CTS (carpal tunnel syndrome)      Comment: bilat  2000: Depression  No date: Diabetes mellitus  No date: Dysfunctional voiding of urine  2000: Fibromyalgia  No date: GERD (gastroesophageal reflux disease)  04/03/2017: H/O bone density study      Comment: osteopenia,   SEE MEDIA  No date: History of meniscal tear  No date: HTN (hypertension)  No date: Incontinence  2003: Metacarpal bone fracture      Comment: left 5th  No date: Migraines  No date: OAB (overactive bladder)      Comment: documented on UDS 2009  No date: Obesity  march 2006: Osteopenia  1996: Plantar fasciitis  7/19/16: Pre-syncope  No date: Renal insufficiency  No date: Sleep apnea  No date: Type II or unspecified type diabetes mellitus *  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 Shirley Blum  : EGD  1/15: HX ARTHROTOMY      Comment: left shoulder, with decompression. Dr. Linda Sullivan.                Shall  2016: HX CARPAL TUNNEL RELEASE  No date: HX  SECTION  : HX CHOLECYSTECTOMY  2014: HX COLONOSCOPY  No date: HX ENDOSCOPY      Comment: ENDOSCOPY,COLON, DIAGNOSTIC  No date: HX KNEE ARTHROSCOPY  2015: HX OTHER SURGICAL      Comment: SHOULDER REPAIR  12/23/15: WRIST ARTHROSCOP,RELEASE Luisito Herrmann LIG      Comment: Carpal Tunnel Release  16: WRIST Dilciapati Cerda LIG      Comment: Carpal Tunnel release                Social History    Marital status: SINGLE              Spouse name:                       Years of education:                 Number of children:               Occupational History    None on file    Social History Main Topics    Smoking status: Never Smoker                                                                Smokeless status: Never Used                        Alcohol use: No              Drug use: No              Sexual activity: No                   Other Topics            Concern    None on file    Social History Narrative    None on file                  Current Outpatient Prescriptions:  omeprazole (PRILOSEC) 20 mg capsule,   Insulin Needles, Disposable, (BD ULTRA-FINE MINI PEN NEEDLE) 31 gauge x 3/16\" ndle, BD Ultra-Fine Mini Pen Needle 31 gauge x 3/16\"  lancets (ONETOUCH DELICA LANCETS) 30 gauge misc, OneTouch Delica Lancets 30 gauge  cholecalciferol (VITAMIN D3) 1,000 unit cap, Take by oral route. EVELIA PEN NEEDLE 32 gauge x /32\" ndle, inject once daily  triamcinolone acetonide (KENALOG) 0.025 % topical cream, Apply  to affected area two (2) times a day.  use thin layer  enalapril (VASOTEC) 20 mg tablet, take 1 tablet by mouth once daily (Patient taking differently: take 0.5 tabs PO daily)  AMITIZA 8 mcg capsule, take 2 capsules by mouth twice a day with meals  buPROPion XL (WELLBUTRIN XL) 150 mg tablet, take 1 tablet by mouth once daily  VICTOZA 3-KIRK 0.6 mg/0.1 mL (18 mg/3 mL) pnij, inject 0.6 milligrams subcutaneously daily for 7 days then 1.2 mi...  (REFER TO PRESCRIPTION NOTES). NYAMYC powder, APPLY THIN LAYER UNDER BREASTS TWICE A DAY AS NEED FOR FLARE  carvedilol (COREG) 12.5 mg tablet, take 1 tablet by mouth twice a day  SUMAtriptan succinate (IMITREX STATDOSE PEN) 6 mg/0.5 mL kit, 6 mg by SubCUTAneous route once as needed for Migraine. DULoxetine (CYMBALTA) 60 mg capsule, 120 mg daily. hydrOXYzine (ATARAX) 50 mg tablet, take 1 tablet by mouth every 12 hours  multivitamin (ONE A DAY) tablet, Take 1 Tab by mouth daily. BD INSULIN PEN NEEDLE UF MINI 31 x 3/16 \" ndle,   spironolactone (ALDACTONE) 25 mg tablet, Take 25 mg by mouth daily. coenzyme q10-vitamin e (COQ10 ) 100-100 mg-unit cap, Take  by mouth two (2) times a day. clonazepam (KLONOPIN) 0.5 mg tablet, Take  by mouth two (2) times a day. rosuvastatin (CRESTOR) 40 mg tablet, Take 40 mg by mouth daily. NON FORMULARY   fenofibrate micronized (LOFIBRA) 134 mg capsule, Take  by mouth every morning. polyethylene glycol (MIRALAX) 17 gram/dose powder, Take 17 g by mouth daily. aspirin 81 mg chewable tablet, Take 81 mg by mouth daily. CALCIUM CARBONATE/VITAMIN D3 (CALCIUM 600 + D,3, PO), Take 1 Cap by mouth daily. tiZANidine (ZANAFLEX) 4 mg tablet, Take 1 Tab by mouth two (2) times daily as needed. Indications: Muscle Spasm  meloxicam (MOBIC) 15 mg tablet, Take 1 Tab by mouth daily. cetirizine-psuedoePHEDrine (ZYRTEC-D) 5-120 mg per tablet, Take 1 Tab by mouth two (2) times a day. HYDROcodone-acetaminophen (XODOL) 5-300 mg tablet, take 1 tablet by mouth every 4 to 6 hours if needed  metFORMIN ER (GLUCOPHAGE XR) 500 mg tablet,   dulaglutide (TRULICITY) 1.5 UL/1.2 mL sub-q pen, 1.5 mg by SubCUTAneous route every seven (7) days. estradiol (ESTRACE) 0.01 % (0.1 mg/gram) vaginal cream, Insert 2 g into vagina daily. Apply fingertip amount to outside of vaginal opening. No current facility-administered medications for this visit. -- Digoxin -- Nausea Only   -- Niaspan (Niacin) -- Rash   -- Wellbutrin (Bupropion Hcl) -- Itching        Pre-op Exam   The history is provided by the patient. This is a new problem. Pertinent negatives include no chest pain and no shortness of breath. Review of Systems   Constitutional: Negative for chills and fever. HENT: Negative for congestion, ear pain and sore throat. Respiratory: Negative for cough and shortness of breath. Cardiovascular: Negative for chest pain and palpitations. Gastrointestinal: Negative for diarrhea. Genitourinary: Negative for dysuria, frequency and urgency. Neurological: Negative. Physical Exam   Constitutional: She is oriented to person, place, and time. She appears well-developed and well-nourished. No distress. Cardiovascular: Normal rate and regular rhythm. Pulmonary/Chest: Effort normal and breath sounds normal.   Musculoskeletal: She exhibits no edema. Neurological: She is alert and oriented to person, place, and time. Skin: Skin is warm and dry. She is not diaphoretic. Very slight rough patch on upper posterior left shoulder with ? Fine rash? Psychiatric: She has a normal mood and affect. Nursing note and vitals reviewed. ASSESSMENT and PLAN    ICD-10-CM ICD-9-CM    1. Pre-op exam Z01.818 V72.84    2. Primary osteoarthritis of left knee M17.12 715.16    3. Essential hypertension I10 401.9    4. Type 2 diabetes mellitus without complication, without long-term current use of insulin ([facility]) E11.9 250.00        Reviewed lab and EKG from [facility]    EKG has been abnormal for years. Nothing new noted. Pt's BP is controlled    DM has a been good    Is holding her aspirin. She was already told to take her Cymbalta, coreg, and omeprazole on the morning of surgery.      Cleared for planned surgery. Incidentally, Discussed BMI/weight, lifestyle, diet and exercise. Discussed effect on blood pressure, blood sugar, and joints especially  Focus on limiting white carbs, portion control, and moving more. She will work on it while recovering. F/u as appointed. May move appt if not up to coming in then.

## 2018-09-25 NOTE — MR AVS SNAPSHOT
[address] [facility] 
 
 
 [address] [place] [address] 
[phone] Patient: Chaka Barclay MRN: [id number] Visit Information Date & Time Provider Department Dept. Phone Encounter #  
 9/25/2018  4:15 PM Geoffrey Hays [facility] [phone] [id number] Follow-up Instructions Return if symptoms worsen or fail to improve, for or as appointed. Your Appointments 10/5/2018 11:00 AM  
Office Visit with Geoffrey Hays MD  
[facility]) Appt Note: 6 month f/u HTN/DM; chol  
 [address]  
  
   
 [address] [facility] Upcoming Health Maintenance Date Due Pneumococcal 19-64 Medium Risk (1 of 1 - PPSV23) 6/19/1980 Shingrix Vaccine Age 50> (1 of 2) 6/19/2011 Influenza Age 5 to Adult 8/1/2018 HEMOGLOBIN A1C Q6M 10/5/2018 MICROALBUMIN Q1 12/4/2018 LIPID PANEL Q1 12/4/2018 FOOT EXAM Q1 12/28/2018 EYE EXAM RETINAL OR DILATED Q1 1/15/2019 MEDICARE YEARLY EXAM 4/6/2019 BREAST CANCER SCRN MAMMOGRAM 12/27/2019 PAP AKA CERVICAL CYTOLOGY 8/30/2020 DTaP/Tdap/Td series (2 - Td) 11/1/2022 COLONOSCOPY 4/1/2024 Allergies as of 9/25/2018  Review Complete On: 9/25/2018 By: Geoffrey Hays MD  
  
 Severity Noted Reaction Type Reactions Digoxin  11/24/2014   Side Effect Nausea Only Niaspan [Niacin]  10/31/2011   Side Effect Rash Wellbutrin [Bupropion Hcl]  11/24/2014   Side Effect Itching Current Immunizations  Reviewed on 9/24/2018 Name Date Influenza Vaccine 9/27/2013  3:46 PM  
 Influenza Vaccine (Quad) PF 10/17/2017, 10/13/2016, 12/17/2015  2:37 PM  
 Influenza Vaccine PF 10/20/2014 Influenza Vaccine Split 11/1/2012  9:46 AM  
 Pneumococcal Conjugate (PCV-13) 12/12/2016  TDAP Vaccine 11/1/2012  9:45 AM  
  
 Not reviewed this visit You Were Diagnosed With   
  
 Codes Comments Pre-op exam    -  Primary ICD-10-CM: L22.278 ICD-9-CM: V72.84 Primary osteoarthritis of left knee     ICD-10-CM: M17.12 
ICD-9-CM: 715.16 Essential hypertension     ICD-10-CM: I10 
ICD-9-CM: 401.9 Type 2 diabetes mellitus without complication, without long-term current use of insulin (HCC)     ICD-10-CM: E11.9 ICD-9-CM: 250.00 Vitals BP Pulse Temp Resp Height(growth percentile) Weight(growth percentile) 110/70 (BP 1 Location: Right arm, BP Patient Position: Sitting) 89 96.4 °F (35.8 °C) (Oral) 18 5' 7\" (1.702 m) 257 lb 12.8 oz (116.9 kg) SpO2 BMI OB Status Smoking Status 98% 40.38 kg/m2 Postmenopausal Never Smoker Vitals History BMI and BSA Data Body Mass Index Body Surface Area  
 40.38 kg/m 2 2.35 m 2 Preferred Pharmacy Pharmacy Name Phone [facility] [address]  [facility] [phone] Your Updated Medication List  
  
   
This list is accurate as of 9/25/18  4:58 PM.  Always use your most recent med list.  
  
  
  
  
 AMITIZA 8 mcg capsule Generic drug:  lubiPROStone  
take 2 capsules by mouth twice a day with meals  
  
 aspirin 81 mg chewable tablet Take 81 mg by mouth daily. * BD ULTRA-FINE MINI PEN NEEDLE 31 gauge x 3/16\" Ndle Generic drug:  Insulin Needles (Disposable) BD Ultra-Fine Mini Pen Needle 31 gauge x 3/16\" BD ULTRA-FINE MINI PEN NEEDLE 31 gauge x 3/16\" Ndle Generic drug:  Insulin Needles (Disposable) * Anaid Pen Needle 32 gauge x 5/32\" Ndle Generic drug:  Insulin Needles (Disposable)  
inject once daily buPROPion  mg tablet Commonly known as:  WELLBUTRIN XL  
take 1 tablet by mouth once daily CALCIUM 600 + D(3) PO Take 1 Cap by mouth daily. carvedilol 12.5 mg tablet Commonly known as:  COREG  
take 1 tablet by mouth twice a day cholecalciferol 1,000 unit Cap Commonly known as:  VITAMIN D3 Take by oral route. clonazePAM 0.5 mg tablet Commonly known as:  Elta Halsted Take  by mouth two (2) times a day. COQ10  100-100 mg-unit Cap Generic drug:  coenzyme q10-vitamin e Take  by mouth two (2) times a day. CRESTOR 40 mg tablet Generic drug:  rosuvastatin Take 40 mg by mouth daily. NON FORMULARY DULoxetine 60 mg capsule Commonly known as:  CYMBALTA  
120 mg daily. enalapril 20 mg tablet Commonly known as:  VASOTEC  
take 1 tablet by mouth once daily  
  
 hydrOXYzine HCl 50 mg tablet Commonly known as:  ATARAX  
take 1 tablet by mouth every 12 hours LOFIBRA 134 mg capsule Generic drug:  fenofibrate micronized Take  by mouth every morning. MIRALAX 17 gram/dose powder Generic drug:  polyethylene glycol Take 17 g by mouth daily. multivitamin tablet Commonly known as:  ONE A DAY Take 1 Tab by mouth daily. NYAMYC powder Generic drug:  nystatin APPLY THIN LAYER UNDER BREASTS TWICE A DAY AS NEED FOR FLARE  
  
 omeprazole 20 mg capsule Commonly known as:  PRILOSEC  
  
 ONETOUCH DELICA LANCETS [address] [facility] Generic drug:  lancets OneTouch Delica Lancets 30 gauge  
  
 spironolactone 25 mg tablet Commonly known as:  ALDACTONE Take 25 mg by mouth daily. SUMAtriptan succinate 6 mg/0.5 mL kit Commonly known as:  IMITREX STATDOSE PEN  
6 mg by SubCUTAneous route once as needed for Migraine. triamcinolone acetonide 0.025 % topical cream  
Commonly known as:  KENALOG Apply  to affected area two (2) times a day. use thin layer VICTOZA 3-KIRK 0.6 mg/0.1 mL (18 mg/3 mL) Pnij Generic drug:  Liraglutide  
inject 0.6 milligrams subcutaneously daily for 7 days then 1.2 mi...  (REFER TO PRESCRIPTION NOTES). * Notice:   This list has 2 medication(s) that are the same as other medications prescribed for you. Read the directions carefully, and ask your doctor or other care provider to review them with you. Follow-up Instructions Return if symptoms worsen or fail to improve, for or as appointed. Introducing [facility] & HEALTH SERVICES! Dear Devon Lee: Thank you for requesting a [employer] account. Our records indicate that you already have an active [employer] account. You can access your account anytime at [email or web address] Did you know that you can access your hospital and ER discharge instructions at any time in [employer]? You can also review all of your test results from your hospital stay or ER visit. Additional Information If you have questions, please visit the Frequently Asked Questions section of the [employer] website at [email or web address] Remember, [employer] is NOT to be used for urgent needs. For medical emergencies, dial 911. Now available from your iPhone and Android! Please provide this summary of care documentation to your next provider. Your primary care clinician is listed as [facility]. If you have any questions after today's visit, please call [phone].

## 2018-10-04 ENCOUNTER — TELEPHONE (OUTPATIENT)
Dept: INTERNAL MEDICINE CLINIC | Age: 58
End: 2018-10-04

## 2018-10-04 NOTE — TELEPHONE ENCOUNTER
Didier Owusu with [address] calling to let you know the patient's heart rate was elevated today during therapy, (HR - 107). States the patiet tis asymptomatic but they have to let you know.

## 2018-10-08 ENCOUNTER — OFFICE VISIT (OUTPATIENT)
Dept: INTERNAL MEDICINE CLINIC | Age: 58
End: 2018-10-08

## 2018-10-08 VITALS
HEART RATE: 99 BPM | TEMPERATURE: 98.3 F | DIASTOLIC BLOOD PRESSURE: 88 MMHG | RESPIRATION RATE: 22 BRPM | BODY MASS INDEX: 38.71 KG/M2 | OXYGEN SATURATION: 100 % | SYSTOLIC BLOOD PRESSURE: 123 MMHG | WEIGHT: 246.6 LBS | HEIGHT: 67 IN

## 2018-10-08 DIAGNOSIS — K59.00 CONSTIPATION, UNSPECIFIED CONSTIPATION TYPE: Primary | ICD-10-CM

## 2018-10-08 NOTE — PROGRESS NOTES
HISTORY OF PRESENT ILLNESS  Halima Sweet is a 63 y.o. female. Visit Vitals    /88 (BP 1 Location: Right arm, BP Patient Position: Sitting)    Pulse 99    Temp 98.3 °F (36.8 °C) (Oral)    Resp 22    Ht 5' 7\" (1.702 m)    Wt 246 lb 9.6 oz (111.9 kg)    SpO2 100%    BMI 38.62 kg/m2       HPI Comments:   She went home from the hospital a week ago after left TKR. Friday she began having abdominal pain and cramping with constipation. She has tried apples, Dulcolax stool softener and suppositories. Fleet enema. Got some results but not enough to relieve her sxs  It is painful under her left rib cage. She is nauseated and vomiting. Afraid to eat. Not passing gas today  Appears in pain. No fever or chills. But can't get comfortable in any position    Was on Norco and Percocet but has stopped taking these    Constipation    The history is provided by the patient. This is a new problem. The current episode started more than 1 week ago. Associated symptoms include abdominal pain, nausea (multiple OTC meds) and constipation. Risk factors include a recent illness. The treatment provided no relief. Nausea    The history is provided by the patient (since becoming constipated after surgery. ). The current episode started more than 1 week ago. The problem has been gradually worsening. There has been no fever. Associated symptoms include abdominal pain. Review of Systems   Gastrointestinal: Positive for abdominal pain, constipation and nausea (multiple OTC meds). Physical Exam   Constitutional: She is oriented to person, place, and time. She appears well-developed and well-nourished. She appears distressed. Cardiovascular: Normal rate. Pulmonary/Chest: Effort normal.   Abdominal: Soft. She exhibits distension. There is tenderness. There is no rebound. Neurological: She is alert and oriented to person, place, and time. Skin: Skin is warm and dry. She is not diaphoretic.    Psychiatric: Tearful, crying, Appears in pain   Nursing note and vitals reviewed. ASSESSMENT and PLAN    ICD-10-CM ICD-9-CM    1. Constipation, unspecified constipation type K59.00 564.00        I am concerned about her constipation after surgery. ?ileus. from meds  Her VS are OK here. Recommend she go to the ER for evaluation. May need fluids. May need additional meds.  May need bowel rest.  Needs Xray and lab    F/y here as appointed or can call back to reschedule

## 2018-10-08 NOTE — PROGRESS NOTES
ROOM # 5  Identified pt with two pt identifiers(name and ). Reviewed record in preparation for visit and have obtained necessary documentation. Chief Complaint   Patient presents with    Constipation    Nausea      [facility] preferred language for health care discussion is english/other. Is the patient using any DME equipment during OV? Nette Sandoval is due for:  Health Maintenance Due   Topic    Pneumococcal 19-64 Medium Risk (1 of 1 - PPSV23)    Shingrix Vaccine Age 50> (1 of 2)    Influenza Age 5 to Adult     HEMOGLOBIN A1C Q6M      Health Maintenance reviewed and discussed per provider  Please order/place referral if appropriate. Advance Directive:  1. Do you have an advance directive in place? Patient Reply: NO    2. If not, would you like material regarding how to put one in place? NO    Coordination of Care:  1. Have you been to the ER, urgent care clinic since your last visit? Hospitalized since your last visit? YES    2. Have you seen or consulted any other health care providers outside of the [facility] since your last visit? Include any pap smears or colon screening. YES    Patient is accompanied by friend I have received verbal consent from [facility] to discuss any/all medical information while they are present in the room.     Learning Assessment:  Learning Assessment 2014   PRIMARY LEARNER Patient   HIGHEST LEVEL OF EDUCATION - PRIMARY LEARNER  2 YEARS OF COLLEGE   BARRIERS PRIMARY LEARNER NONE   CO-LEARNER CAREGIVER No   PRIMARY LANGUAGE ENGLISH   LEARNER PREFERENCE PRIMARY DEMONSTRATION   ANSWERED BY self   RELATIONSHIP SELF     Depression Screening:  PHQ over the last two weeks 10/8/2018 2018 2017 2017 3/16/2017 2016 10/13/2016   PHQ Not Done - - - Active Diagnosis of Depression or Bipolar Disorder Active Diagnosis of Depression or Bipolar Disorder - -   Little interest or pleasure in doing things Not at all Not at all Not at all Not at all - Several days Not at all   Feeling down, depressed, irritable, or hopeless Not at all Several days Not at all Not at all - Several days Not at all   Total Score PHQ 2 0 1 0 0 - 2 0     Abuse Screening:  No flowsheet data found. Fall Risk  No flowsheet data found.

## 2018-10-08 NOTE — MR AVS SNAPSHOT
[facility] 
 
 
 [address] [facility] [address] [address] 
[phone] Patient: Anatoliy Hodgkin MRN: [id number] Visit Information Date & Time Provider Department Dept. Phone Encounter #  
 10/8/2018  1:30 PM [address] [phone] [id number] Follow-up Instructions Return if symptoms worsen or fail to improve, for will call. Upcoming Health Maintenance Date Due Pneumococcal 19-64 Medium Risk (1 of 1 - PPSV23) 6/19/1980 Shingrix Vaccine Age 50> (1 of 2) 6/19/2011 Influenza Age 5 to Adult 8/1/2018 HEMOGLOBIN A1C Q6M 10/5/2018 MICROALBUMIN Q1 12/4/2018 LIPID PANEL Q1 12/4/2018 FOOT EXAM Q1 12/28/2018 EYE EXAM RETINAL OR DILATED Q1 1/15/2019 MEDICARE YEARLY EXAM 4/6/2019 BREAST CANCER SCRN MAMMOGRAM 12/27/2019 PAP AKA CERVICAL CYTOLOGY 8/30/2020 DTaP/Tdap/Td series (2 - Td) 11/1/2022 COLONOSCOPY 4/1/2024 Allergies as of 10/8/2018  Review Complete On: 10/8/2018 By: Neelima Adams MD  
  
 Severity Noted Reaction Type Reactions Digoxin  11/24/2014   Side Effect Nausea Only Niaspan [Niacin]  10/31/2011   Side Effect Rash Wellbutrin [Bupropion Hcl]  11/24/2014   Side Effect Itching Current Immunizations  Reviewed on 9/24/2018 Name Date Influenza Vaccine 9/27/2013  3:46 PM  
 Influenza Vaccine (Quad) PF 10/17/2017, 10/13/2016, 12/17/2015  2:37 PM  
 Influenza Vaccine PF 10/20/2014 Influenza Vaccine Split 11/1/2012  9:46 AM  
 Pneumococcal Conjugate (PCV-13) 12/12/2016 TDAP Vaccine 11/1/2012  9:45 AM  
  
 Not reviewed this visit You Were Diagnosed With   
  
 Codes Comments Constipation, unspecified constipation type    -  Primary ICD-10-CM: K59.00 ICD-9-CM: 564.00 Vitals BP Pulse Temp Resp Height(growth percentile) Weight(growth percentile) 123/88 (BP 1 Location: Right arm, BP Patient Position: Sitting) 99 98.3 °F (36.8 °C) (Oral) 22 5' 7\" (1.702 m) 246 lb 9.6 oz (111.9 kg) SpO2 BMI OB Status Smoking Status 100% 38.62 kg/m2 Postmenopausal Never Smoker BMI and BSA Data Body Mass Index Body Surface Area  
 38.62 kg/m 2 2.3 m 2 Preferred Pharmacy Pharmacy Name Phone [address]  [facility] [phone] Your Updated Medication List  
  
   
This list is accurate as of 10/8/18  2:15 PM.  Always use your most recent med list.  
  
  
  
  
 AMITIZA 8 mcg capsule Generic drug:  lubiPROStone  
take 2 capsules by mouth twice a day with meals  
  
 aspirin 81 mg chewable tablet Take 81 mg by mouth daily. * BD ULTRA-FINE MINI PEN NEEDLE 31 gauge x 3/16\" Ndle Generic drug:  Insulin Needles (Disposable) BD Ultra-Fine Mini Pen Needle 31 gauge x 3/16\" BD ULTRA-FINE MINI PEN NEEDLE 31 gauge x 3/16\" Ndle Generic drug:  Insulin Needles (Disposable) * Anaid Pen Needle 32 gauge x 5/32\" Ndle Generic drug:  Insulin Needles (Disposable)  
inject once daily buPROPion  mg tablet Commonly known as:  WELLBUTRIN XL  
take 1 tablet by mouth once daily CALCIUM 600 + D(3) PO Take 1 Cap by mouth daily. carvedilol 12.5 mg tablet Commonly known as:  COREG  
take 1 tablet by mouth twice a day  
  
 cholecalciferol 1,000 unit Cap Commonly known as:  VITAMIN D3 Take by oral route. clonazePAM 0.5 mg tablet Commonly known as:  Trula Brothers Take  by mouth two (2) times a day. COQ10  100-100 mg-unit Cap Generic drug:  coenzyme q10-vitamin e Take  by mouth two (2) times a day. CRESTOR 40 mg tablet Generic drug:  rosuvastatin Take 40 mg by mouth daily. NON FORMULARY DULoxetine 60 mg capsule Commonly known as:  CYMBALTA  
120 mg daily. enalapril 20 mg tablet Commonly known as:  Susa Taj  
 take 1 tablet by mouth once daily  
  
 hydrOXYzine HCl 50 mg tablet Commonly known as:  ATARAX  
take 1 tablet by mouth every 12 hours LOFIBRA 134 mg capsule Generic drug:  fenofibrate micronized Take  by mouth every morning. MIRALAX 17 gram/dose powder Generic drug:  polyethylene glycol Take 17 g by mouth daily. multivitamin tablet Commonly known as:  ONE A DAY Take 1 Tab by mouth daily. NYAMYC powder Generic drug:  nystatin APPLY THIN LAYER UNDER BREASTS TWICE A DAY AS NEED FOR FLARE  
  
 omeprazole 20 mg capsule Commonly known as:  PRILOSEC  
  
 ONETOUCH DELICA LANCETS [facility] [facility] Generic drug:  lancets OneTouch Delica Lancets 30 gauge  
  
 spironolactone 25 mg tablet Commonly known as:  ALDACTONE Take 25 mg by mouth daily. SUMAtriptan succinate 6 mg/0.5 mL kit Commonly known as:  IMITREX STATDOSE PEN  
6 mg by SubCUTAneous route once as needed for Migraine. triamcinolone acetonide 0.025 % topical cream  
Commonly known as:  KENALOG Apply  to affected area two (2) times a day. use thin layer VICTOZA 3-KIRK 0.6 mg/0.1 mL (18 mg/3 mL) Pnij Generic drug:  Liraglutide  
inject 0.6 milligrams subcutaneously daily for 7 days then 1.2 mi...  (REFER TO PRESCRIPTION NOTES). * Notice: This list has 2 medication(s) that are the same as other medications prescribed for you. Read the directions carefully, and ask your doctor or other care provider to review them with you. Follow-up Instructions Return if symptoms worsen or fail to improve, for will call. Introducing \[facility]\"" & HEALTH SERVICES! Dear Evelyn Herb: Thank you for requesting a [employer] account. Our records indicate that you already have an active [employer] account. You can access your account anytime at [email or web address] Did you know that you can access your hospital and ER discharge instructions at any time in [employer]? You can also review all of your test results from your hospital stay or ER visit. Additional Information If you have questions, please visit the Frequently Asked Questions section of the [employer] website at [email or web address] Remember, [employer] is NOT to be used for urgent needs. For medical emergencies, dial 911. Now available from your iPhone and Android! Please provide this summary of care documentation to your next provider. Your primary care clinician is listed as [facility]. If you have any questions after today's visit, please call [phone].

## 2018-10-09 DIAGNOSIS — R11.0 NAUSEA: ICD-10-CM

## 2018-10-09 RX ORDER — ONDANSETRON 8 MG/1
TABLET, ORALLY DISINTEGRATING ORAL
Qty: 15 TAB | Refills: 1 | Status: SHIPPED | OUTPATIENT
Start: 2018-10-09 | End: 2019-04-04

## 2018-10-18 ENCOUNTER — OFFICE VISIT (OUTPATIENT)
Dept: INTERNAL MEDICINE CLINIC | Age: 58
End: 2018-10-18

## 2018-10-18 VITALS
RESPIRATION RATE: 12 BRPM | BODY MASS INDEX: 37.67 KG/M2 | HEIGHT: 67 IN | OXYGEN SATURATION: 94 % | TEMPERATURE: 98.5 F | WEIGHT: 240 LBS | SYSTOLIC BLOOD PRESSURE: 101 MMHG | HEART RATE: 100 BPM | DIASTOLIC BLOOD PRESSURE: 69 MMHG

## 2018-10-18 DIAGNOSIS — K59.03 CONSTIPATION DUE TO PAIN MEDICATION: Primary | ICD-10-CM

## 2018-10-18 DIAGNOSIS — R63.0 ANOREXIA: ICD-10-CM

## 2018-10-18 DIAGNOSIS — R43.2 TASTE SENSE ALTERED: ICD-10-CM

## 2018-10-18 DIAGNOSIS — M25.562 LEFT KNEE PAIN, UNSPECIFIED CHRONICITY: ICD-10-CM

## 2018-10-18 DIAGNOSIS — Z23 ENCOUNTER FOR IMMUNIZATION: ICD-10-CM

## 2018-10-18 RX ORDER — WARFARIN 2 MG/1
8 TABLET ORAL
COMMUNITY
Start: 2018-10-01 | End: 2018-10-22

## 2018-10-18 RX ORDER — LANOLIN ALCOHOL/MO/W.PET/CERES
CREAM (GRAM) TOPICAL
COMMUNITY

## 2018-10-18 RX ORDER — OXYCODONE HYDROCHLORIDE 5 MG/1
5 TABLET ORAL
COMMUNITY
Start: 2018-10-15 | End: 2019-04-04

## 2018-10-18 RX ORDER — TRAMADOL HYDROCHLORIDE 50 MG/1
50 TABLET ORAL
COMMUNITY
Start: 2018-10-15 | End: 2019-04-04

## 2018-10-18 RX ORDER — ESTRADIOL 0.1 MG/G
CREAM VAGINAL
COMMUNITY
End: 2019-04-04

## 2018-10-18 RX ORDER — ACETAMINOPHEN 500 MG
1000 TABLET ORAL
Qty: 120 TAB | Refills: 1 | Status: SHIPPED | OUTPATIENT
Start: 2018-10-18 | End: 2019-04-04

## 2018-10-18 RX ORDER — LACTULOSE 10 G/15ML
1 SOLUTION ORAL; RECTAL
Qty: 236 ML | Refills: 1 | Status: SHIPPED | OUTPATIENT
Start: 2018-10-18 | End: 2018-12-07 | Stop reason: SDUPTHER

## 2018-10-18 RX ORDER — ACETAMINOPHEN 325 MG/1
350 TABLET ORAL
COMMUNITY
Start: 2018-10-08 | End: 2019-04-04

## 2018-10-18 NOTE — PROGRESS NOTES
HISTORY OF PRESENT ILLNESS  Jose Lance is a 63 y.o. female. Here to f/u on ER visit for constipation. We had sent her from here. The ER r/o'd an obstruction or ileus. But did not give her anything. Taking dulcolax stool softener. Still taking miralax in the morning  She has been afraid to take any more of the percocet. But she is in pain from her knee replacement. Having one good BM daily now. She has not had any further pain. Constipation    The history is provided by the patient. This is a recurrent problem. The current episode started 12 to 24 hours ago. Associated symptoms include constipation. Pertinent negatives include no chills and no fever. Risk factors include a recent illness. Treatments tried: see narrative. The treatment provided moderate relief. Review of Systems   Constitutional: Negative for chills and fever. Gastrointestinal: Positive for constipation. Musculoskeletal:        Still recovering from left TKR       Physical Exam   Constitutional: She is oriented to person, place, and time. She appears well-developed and well-nourished. No distress. HENT:   Nothing unusual seen in mouth to explain altered taste   Cardiovascular: Normal rate. Pulmonary/Chest: Effort normal.   Abdominal: Soft. She exhibits no distension. There is no tenderness. There is no rebound. Musculoskeletal: She exhibits no edema. Neurological: She is alert and oriented to person, place, and time. Skin: Skin is warm and dry. She is not diaphoretic. Psychiatric: She has a normal mood and affect. Nursing note and vitals reviewed. ASSESSMENT and PLAN    ICD-10-CM ICD-9-CM    1. Constipation due to pain medication K59.03 564.09 lactulose (CHRONULAC) 10 gram/15 mL solution     E947.9    2. Anorexia R63.0 783.0    3. Taste sense altered R43.2 781.1    4. Left knee pain, unspecified chronicity M25.562 719.46 acetaminophen (TYLENOL) 500 mg tablet   5.  Encounter for immunization Z23 V03.89 INFLUENZA VIRUS VAC QUAD,SPLIT,PRESV FREE SYRINGE IM      ADMIN INFLUENZA VIRUS VAC     Available hospital/ER record reviewed    Doing much better off percocet regarding her bowels  OK to take tylenol with tramadol    Will add lactulose just in case she need to take the percocet at night only    F/u 3-4 weeks for recheck

## 2018-10-18 NOTE — PROGRESS NOTES
Lachelle Haywood is a 63 y.o. female (: 1960) presenting to address:    Chief Complaint   Patient presents with   [facility] Follow Up     constipation       Vitals:    10/18/18 1509   PainSc:   0 - No pain       Hearing/Vision:   No exam data present    Learning Assessment:     Learning Assessment 2014   PRIMARY LEARNER Patient   HIGHEST LEVEL OF EDUCATION - PRIMARY LEARNER  2 YEARS OF COLLEGE   BARRIERS PRIMARY LEARNER NONE   CO-LEARNER CAREGIVER No   PRIMARY LANGUAGE ENGLISH   LEARNER PREFERENCE PRIMARY DEMONSTRATION   ANSWERED BY self   RELATIONSHIP SELF     Depression Screening:     PHQ over the last two weeks 10/18/2018   PHQ Not Done -   Little interest or pleasure in doing things Not at all   Feeling down, depressed, irritable, or hopeless Not at all   Total Score PHQ 2 0     Fall Risk Assessment:   No flowsheet data found. Abuse Screening:   No flowsheet data found. Coordination of Care Questionaire:   1. Have you been to the ER, urgent care clinic since your last visit? Hospitalized since your last visit? YES    2. Have you seen or consulted any other health care providers outside of the [address] [place] [address] since your last visit? Include any pap smears or colon screening. NO    Advanced Directive:   1. Do you have an Advanced Directive? NO    2. Would you like information on Advanced Directives? NO    Pt request flu vaccine. Flu Immunization/s administered 10/18/2018 by Justice Viera in left deltoid. Patient tolerated procedure well. No reactions noted.

## 2018-12-06 DIAGNOSIS — K59.03 CONSTIPATION DUE TO PAIN MEDICATION: ICD-10-CM

## 2018-12-07 RX ORDER — LACTULOSE 10 G/15ML
SOLUTION ORAL
Qty: 236 ML | Refills: 1 | Status: SHIPPED | OUTPATIENT
Start: 2018-12-07 | End: 2020-06-04

## 2018-12-19 DIAGNOSIS — K59.04 CHRONIC IDIOPATHIC CONSTIPATION: ICD-10-CM

## 2018-12-19 RX ORDER — LUBIPROSTONE 8 UG/1
CAPSULE, GELATIN COATED ORAL
Qty: 120 CAP | Refills: 5 | Status: SHIPPED | OUTPATIENT
Start: 2018-12-19 | End: 2019-07-11 | Stop reason: SDUPTHER

## 2019-01-02 LAB — MICROALBUMIN UR TEST STR-MCNC: NORMAL MG/DL

## 2019-01-11 DIAGNOSIS — Z76.0 MEDICATION REFILL: ICD-10-CM

## 2019-01-11 RX ORDER — ENALAPRIL MALEATE 20 MG/1
TABLET ORAL
Qty: 30 TAB | Refills: 5 | Status: SHIPPED | OUTPATIENT
Start: 2019-01-11 | End: 2019-07-27 | Stop reason: SDUPTHER

## 2019-01-20 RX ORDER — CEFUROXIME AXETIL 250 MG/1
TABLET ORAL
Qty: 1 KIT | Refills: 9 | Status: SHIPPED | OUTPATIENT
Start: 2019-01-20 | End: 2019-01-20 | Stop reason: SDUPTHER

## 2019-01-20 RX ORDER — CEFUROXIME AXETIL 250 MG/1
TABLET ORAL
Qty: 1 KIT | Refills: 11 | Status: SHIPPED | OUTPATIENT
Start: 2019-01-20 | End: 2019-01-26 | Stop reason: SDUPTHER

## 2019-01-25 ENCOUNTER — OFFICE VISIT (OUTPATIENT)
Dept: INTERNAL MEDICINE CLINIC | Age: 59
End: 2019-01-25

## 2019-01-25 VITALS
TEMPERATURE: 98.2 F | SYSTOLIC BLOOD PRESSURE: 115 MMHG | RESPIRATION RATE: 18 BRPM | BODY MASS INDEX: 40.49 KG/M2 | WEIGHT: 258 LBS | HEIGHT: 67 IN | OXYGEN SATURATION: 99 % | HEART RATE: 97 BPM | DIASTOLIC BLOOD PRESSURE: 80 MMHG

## 2019-01-25 DIAGNOSIS — L30.9 ECZEMA, UNSPECIFIED TYPE: ICD-10-CM

## 2019-01-25 DIAGNOSIS — L30.8 OTHER ECZEMA: ICD-10-CM

## 2019-01-25 DIAGNOSIS — G43.009 MIGRAINE WITHOUT AURA AND WITHOUT STATUS MIGRAINOSUS, NOT INTRACTABLE: Primary | ICD-10-CM

## 2019-01-25 DIAGNOSIS — L85.3 DRY SKIN: ICD-10-CM

## 2019-01-25 RX ORDER — METOCLOPRAMIDE HYDROCHLORIDE 10 MG/2ML
10 INJECTION, SOLUTION INTRAMUSCULAR; INTRAVENOUS EVERY 6 HOURS
Qty: 10 ML | Refills: 0 | Status: SHIPPED | OUTPATIENT
Start: 2019-01-25 | End: 2019-02-04

## 2019-01-25 RX ORDER — ACETAMINOPHEN 500 MG
500 TABLET ORAL
Qty: 30 TAB | Refills: 0 | Status: SHIPPED | OUTPATIENT
Start: 2019-01-25

## 2019-01-25 RX ORDER — TRIAMCINOLONE ACETONIDE 0.25 MG/G
CREAM TOPICAL 2 TIMES DAILY
Qty: 15 G | Refills: 5 | Status: SHIPPED | OUTPATIENT
Start: 2019-01-25

## 2019-01-25 RX ORDER — KETOROLAC TROMETHAMINE 30 MG/ML
60 INJECTION, SOLUTION INTRAMUSCULAR; INTRAVENOUS ONCE
Qty: 1 VIAL | Refills: 0
Start: 2019-01-25 | End: 2019-01-25

## 2019-01-25 NOTE — PROGRESS NOTES
ROOM # 3  Kaden Gallego presents today for   Chief Complaint   Patient presents with    Headache     with neck pain x5 days no relief from medications that  are prescribed for migraines       Kaedn Gallego preferred language for health care discussion is english/other. Is someone accompanying this pt? no    Is the patient using any DME equipment during OV? no    Depression Screening:  PHQ over the last two weeks 10/18/2018 10/8/2018 9/25/2018 12/14/2017 7/18/2017 3/16/2017 12/12/2016   PHQ Not Done - - - - Active Diagnosis of Depression or Bipolar Disorder Active Diagnosis of Depression or Bipolar Disorder -   Little interest or pleasure in doing things Not at all Not at all Not at all Not at all Not at all - Several days   Feeling down, depressed, irritable, or hopeless Not at all Not at all Several days Not at all Not at all - Several days   Total Score PHQ 2 0 0 1 0 0 - 2       Learning Assessment:  Learning Assessment 11/24/2014   PRIMARY LEARNER Patient   HIGHEST LEVEL OF EDUCATION - PRIMARY LEARNER  2 YEARS [facility] PRIMARY LEARNER NONE   CO-LEARNER CAREGIVER No   PRIMARY LANGUAGE ENGLISH   LEARNER PREFERENCE PRIMARY DEMONSTRATION   ANSWERED BY self   RELATIONSHIP SELF       Abuse Screening:  No flowsheet data found. Fall Risk  No flowsheet data found. Health Maintenance reviewed and discussed per provider. Yes    Kaden Gallego is due for   Health Maintenance Due   Topic Date Due    Pneumococcal 19-64 Medium Risk (1 of 1 - PPSV23) 06/19/1980    Shingrix Vaccine Age 50> (1 of 2) 06/19/2011    HEMOGLOBIN A1C Q6M  10/05/2018    MICROALBUMIN Q1  12/04/2018    LIPID PANEL Q1  12/04/2018    FOOT EXAM Q1  12/28/2018         Please order/place referral if appropriate. Advance Directive:  1. Do you have an advance directive in place? Patient Reply: no    2. If not, would you like material regarding how to put one in place?  Patient Reply: no    Coordination of Care:  1. Have you been to the ER, urgent care clinic since your last visit? Hospitalized since your last visit? no    2. Have you seen or consulted any other health care providers outside of the [address] [place] [address] since your last visit? Include any pap smears or colon screening.  no

## 2019-01-25 NOTE — PATIENT INSTRUCTIONS
Migraine Headache: Care Instructions  Your Care Instructions  Migraines are painful, throbbing headaches that often start on one side of the head. They may cause nausea and vomiting and make you sensitive to light, sound, or smell. Without treatment, migraines can last from 4 hours to a few days. Medicines can help prevent migraines or stop them after they have started. Your doctor can help you find which ones work best for you. Follow-up care is a key part of your treatment and safety. Be sure to make and go to all appointments, and call your doctor if you are having problems. It's also a good idea to know your test results and keep a list of the medicines you take. How can you care for yourself at home? · Do not drive if you have taken a prescription pain medicine. · Rest in a quiet, dark room until your headache is gone. Close your eyes, and try to relax or go to sleep. Don't watch TV or read. · Put a cold, moist cloth or cold pack on the painful area for 10 to 20 minutes at a time. Put a thin cloth between the cold pack and your skin. · Use a warm, moist towel or a heating pad set on low to relax tight shoulder and neck muscles. · Have someone gently massage your neck and shoulders. · Take your medicines exactly as prescribed. Call your doctor if you think you are having a problem with your medicine. You will get more details on the specific medicines your doctor prescribes. · Be careful not to take pain medicine more often than the instructions allow. You could get worse or more frequent headaches when the medicine wears off. To prevent migraines  · Keep a headache diary so you can figure out what triggers your headaches. Avoiding triggers may help you prevent headaches. Record when each headache began, how long it lasted, and what the pain was like.  (Was it throbbing, aching, stabbing, or dull?) Write down any other symptoms you had with the headache, such as nausea, flashing lights or dark spots, or sensitivity to bright light or loud noise. Note if the headache occurred near your period. List anything that might have triggered the headache. Triggers may include certain foods (chocolate, cheese, wine) or odors, smoke, bright light, stress, or lack of sleep. · If your doctor has prescribed medicine for your migraines, take it as directed. You may have medicine that you take only when you get a migraine and medicine that you take all the time to help prevent migraines. ? If your doctor has prescribed medicine for when you get a headache, take it at the first sign of a migraine, unless your doctor has given you other instructions. ? If your doctor has prescribed medicine to prevent migraines, take it exactly as prescribed. Call your doctor if you think you are having a problem with your medicine. · Find healthy ways to deal with stress. Migraines are most common during or right after stressful times. Take time to relax before and after you do something that has caused a migraine in the past.  · Try to keep your muscles relaxed by keeping good posture. Check your jaw, face, neck, and shoulder muscles for tension. Try to relax them. When you sit at a desk, change positions often. And make sure to stretch for 30 seconds each hour. · Get plenty of sleep and exercise. · Eat meals on a regular schedule. Avoid foods and drinks that often trigger migraines. These include chocolate, alcohol (especially red wine and port), aspartame, monosodium glutamate (MSG), and some additives found in foods (such as hot dogs, dumas, cold cuts, aged cheeses, and pickled foods). · Limit caffeine. Don't drink too much coffee, tea, or soda. But don't quit caffeine suddenly. That can also give you migraines. · Do not smoke or allow others to smoke around you. If you need help quitting, talk to your doctor about stop-smoking programs and medicines. These can increase your chances of quitting for good.   · If you are taking birth control pills or hormone therapy, talk to your doctor about whether they are triggering your migraines. When should you call for help? Call 911 anytime you think you may need emergency care. For example, call if:    · You have signs of a stroke. These may include:  ? Sudden numbness, paralysis, or weakness in your face, arm, or leg, especially on only one side of your body. ? Sudden vision changes. ? Sudden trouble speaking. ? Sudden confusion or trouble understanding simple statements. ? Sudden problems with walking or balance. ? A sudden, severe headache that is different from past headaches.    Call your doctor now or seek immediate medical care if:    · You have new or worse nausea and vomiting.     · You have a new or higher fever.     · Your headache gets much worse.    Watch closely for changes in your health, and be sure to contact your doctor if:    · You are not getting better after 2 days (48 hours). Where can you learn more? Go to http://rm-shannon.info/. Enter V048 in the search box to learn more about \"Migraine Headache: Care Instructions. \"  Current as of: Anna 3, 2018  Content Version: 11.9  © 5507-7063 [employer]. Care instructions adapted under license by [employer] (which disclaims liability or warranty for this information). If you have questions about a medical condition or this instruction, always ask your healthcare professional. [address] any warranty or liability for your use of this information. Atopic Dermatitis: Care Instructions  Your Care Instructions  Atopic dermatitis (also called eczema) is a skin problem that causes intense itching and a red, raised rash. In severe cases, the rash develops clear fluid-filled blisters. The rash is not contagious. People with this condition seem to have very sensitive immune systems that are likely to react to things that cause allergies.  The immune system is the body's way of fighting infection. There is no cure for atopic dermatitis, but you may be able to control it with care at home. Follow-up care is a key part of your treatment and safety. Be sure to make and go to all appointments, and call your doctor if you are having problems. It's also a good idea to know your test results and keep a list of the medicines you take. How can you care for yourself at home? · Use moisturizer at least twice a day. · If your doctor prescribes a cream, use it as directed. If your doctor prescribes other medicine, take it exactly as directed. · Wash the affected area with water only. Soap can make dryness and itching worse. Pat dry. · Apply a moisturizer after bathing. Use a cream such as Lubriderm, Moisturel, or Cetaphil that does not irritate the skin or cause a rash. Apply the cream while your skin is still damp after lightly drying with a towel. · Use cold, wet cloths to reduce itching. · Keep cool, and stay out of the sun. · If itching affects your normal activities, an over-the-counter antihistamine, such as diphenhydramine (Benadryl) or loratadine (Claritin) may help. Read and follow all instructions on the label. When should you call for help? Call your doctor now or seek immediate medical care if:    · Your rash gets worse and you have a fever.     · You have new blisters or bruises, or the rash spreads and looks like a sunburn.     · You have signs of infection, such as:  ? Increased pain, swelling, warmth, or redness. ? Red streaks leading from the rash. ? Pus draining from the rash. ? A fever.     · You have crusting or oozing sores.     · You have joint aches or body aches along with your rash.    Watch closely for changes in your health, and be sure to contact your doctor if:    · Your rash does not clear up after 2 to 3 weeks of home treatment.     · Itching interferes with your sleep or daily activities. Where can you learn more?   Go to http://rm-shannon.info/. Enter G670 in the search box to learn more about \"Atopic Dermatitis: Care Instructions. \"  Current as of: April 17, 2018  Content Version: 11.9  © 0435-6942 [employer]. Care instructions adapted under license by [employer] (which disclaims liability or warranty for this information). If you have questions about a medical condition or this instruction, always ask your healthcare professional. [place] [address] any warranty or liability for your use of this information.

## 2019-01-25 NOTE — PROGRESS NOTES
Patient presents with multiple complaints: she has a hx of migraine and c/o HA,neck pain x 5 days, states she took her Imitrex shot without much relief. She also complaints of itching which she attributes to a dosage change of her Wellbutrin by her psychiatrist,she has been off the Wellbutrin x 10 days, unsure if itching has improved.

## 2019-01-25 NOTE — PROGRESS NOTES
HISTORY OF PRESENT ILLNESS  Danay Castillo is a 63 y.o. female. Patient presents with multiple complaints: she has a hx of migraine and c/o HA,neck pain x 5 days, states she took her Imitrex shot without much relief. She also complaints of itching which she attributes to a dosage change of her Wellbutrin by her psychiatrist,she has been off the Wellbutrin x 10 days, unsure if itching has improved. Review of Systems   Constitutional: Negative. HENT: Negative. Eyes: Negative. Respiratory: Negative. Cardiovascular: Negative. Musculoskeletal: Positive for neck pain. Skin: Negative. Neurological: Positive for headaches. Psychiatric/Behavioral: The patient is nervous/anxious. Physical Exam   Constitutional: She is oriented to person, place, and time. She appears well-developed. HENT:   Head: Normocephalic. Eyes: Pupils are equal, round, and reactive to light. Neck: Normal range of motion. No tracheal deviation present. No thyromegaly present. Cardiovascular: Normal rate. Pulmonary/Chest: Effort normal and breath sounds normal.   Musculoskeletal: Normal range of motion. Neurological: She is alert and oriented to person, place, and time. GCS eye subscore is 4. GCS verbal subscore is 5. GCS motor subscore is 6. Skin: Rash noted. Dry flaky pruritic patches to upper and lower back. Psychiatric: Her speech is normal and behavior is normal. Judgment and thought content normal. Her mood appears anxious. Cognition and memory are normal.       ASSESSMENT and PLAN    ICD-10-CM ICD-9-CM    1. Migraine without aura and without status migrainosus, not intractable G43.009 346.10 ketorolac tromethamine (TORADOL) 60 mg/2 mL soln      KETOROLAC TROMETHAMINE INJ      WA THER/PROPH/DIAG INJECTION, SUBCUT/IM      metoclopramide HCl (REGLAN) 5 mg/mL injection      acetaminophen (TYLENOL EXTRA STRENGTH) 500 mg tablet   2. Dry skin L85.3 701.1    3.  Other eczema L30.8 692.9    4. Eczema, unspecified type L30.9 692.9 triamcinolone acetonide (KENALOG) 0.025 % topical cream     Encounter Diagnoses   Name Primary?  Migraine without aura and without status migrainosus, not intractable Yes    Dry skin     Other eczema     Eczema, unspecified type      Orders Placed This Encounter    ketorolac tromethamine (TORADOL) 60 mg/2 mL soln    metoclopramide HCl (REGLAN) 5 mg/mL injection    triamcinolone acetonide (KENALOG) 0.025 % topical cream    acetaminophen (TYLENOL EXTRA STRENGTH) 500 mg tablet     Orders Placed This Encounter    KETOROLAC TROMETHAMINE INJ     Order Specific Question:   Dose     Answer:   60mg Toradal     Order Specific Question:   Site     Answer:   RIGHT GLUTEUS     Order Specific Question:   Expiration Date     Answer:   2020     Order Specific Question:   Lot#     Answer:   [id number]     Order Specific Question:        Answer:   Pat Santiago     Order Specific Question:   Charge Quantity? Answer:   2     Order Specific Question:   Perfomed by/Witnessed by: Answer:   Migel Núñez     Order Specific Question:   NDC#     Answer:   [id number] [[id number]]    TN THER/PROPH/DIAG INJECTION, SUBCUT/IM    ketorolac tromethamine (TORADOL) 60 mg/2 mL soln     Si mL by IntraMUSCular route once for 1 dose. Dispense:  1 Vial     Refill:  0    metoclopramide HCl (REGLAN) 5 mg/mL injection     Si mL by IntraMUSCular route every six (6) hours. Dispense:  10 mL     Refill:  0    triamcinolone acetonide (KENALOG) 0.025 % topical cream     Sig: Apply  to affected area two (2) times a day. use thin layer     Dispense:  15 g     Refill:  5    acetaminophen (TYLENOL EXTRA STRENGTH) 500 mg tablet     Sig: Take 1 Tab by mouth every six (6) hours as needed for Pain.      Dispense:  30 Tab     Refill:  0     Orders Placed This Encounter    KETOROLAC TROMETHAMINE INJ    TN THER/PROPH/DIAG INJECTION, SUBCUT/IM    ketorolac tromethamine (TORADOL) 60 mg/2 mL soln    metoclopramide HCl (REGLAN) 5 mg/mL injection    triamcinolone acetonide (KENALOG) 0.025 % topical cream    acetaminophen (TYLENOL EXTRA STRENGTH) 500 mg tablet     Diagnoses and all orders for this visit:    1. Migraine without aura and without status migrainosus, not intractable  -     ketorolac tromethamine (TORADOL) 60 mg/2 mL soln; 2 mL by IntraMUSCular route once for 1 dose. -     KETOROLAC TROMETHAMINE INJ  -     PA THER/PROPH/DIAG INJECTION, SUBCUT/IM  -     metoclopramide HCl (REGLAN) 5 mg/mL injection; 2 mL by IntraMUSCular route every six (6) hours. -     acetaminophen (TYLENOL EXTRA STRENGTH) 500 mg tablet; Take 1 Tab by mouth every six (6) hours as needed for Pain. 2. Dry skin    3. Other eczema    4. Eczema, unspecified type  -     triamcinolone acetonide (KENALOG) 0.025 % topical cream; Apply  to affected area two (2) times a day. use thin layer      Follow-up Disposition:  Return if symptoms worsen or fail to improve.

## 2019-01-26 DIAGNOSIS — G43.109 MIGRAINE AURA WITHOUT HEADACHE: Primary | ICD-10-CM

## 2019-01-26 RX ORDER — CEFUROXIME AXETIL 250 MG/1
TABLET ORAL
Qty: 1 KIT | Refills: 11 | Status: SHIPPED | OUTPATIENT
Start: 2019-01-26 | End: 2020-05-01

## 2019-01-30 LAB
HBA1C MFR BLD HPLC: 5.8 %
LDL-C, EXTERNAL: 48

## 2019-02-04 ENCOUNTER — OFFICE VISIT (OUTPATIENT)
Dept: INTERNAL MEDICINE CLINIC | Age: 59
End: 2019-02-04

## 2019-02-04 VITALS
DIASTOLIC BLOOD PRESSURE: 67 MMHG | HEART RATE: 96 BPM | TEMPERATURE: 98.2 F | SYSTOLIC BLOOD PRESSURE: 105 MMHG | BODY MASS INDEX: 40.12 KG/M2 | HEIGHT: 67 IN | WEIGHT: 255.6 LBS | RESPIRATION RATE: 12 BRPM | OXYGEN SATURATION: 99 %

## 2019-02-04 DIAGNOSIS — L29.9 PRURITUS: ICD-10-CM

## 2019-02-04 DIAGNOSIS — Z79.899 MEDICATION MANAGEMENT: ICD-10-CM

## 2019-02-04 DIAGNOSIS — F32.A DEPRESSION, UNSPECIFIED DEPRESSION TYPE: ICD-10-CM

## 2019-02-04 DIAGNOSIS — R51.9 HEADACHE DISORDER: Primary | ICD-10-CM

## 2019-02-04 NOTE — PROGRESS NOTES
Thompson Freedman is a 63 y.o. female (: 1960) presenting to address:    Chief Complaint   Patient presents with    Excessive Sweating     Pt complains of sweating throughout the day and night    Skin Problem     Pt request allergy test    Medication Evaluation       Vitals:    19 1615   Pulse: 96   Resp: 12   Temp: 98.2 °F (36.8 °C)   TempSrc: Oral   SpO2: 99%   Weight: 255 lb 9.6 oz (115.9 kg)   Height: 5' 7\" (1.702 m)   PainSc:   0 - No pain       Hearing/Vision:   No exam data present    Learning Assessment:     Learning Assessment 2014   PRIMARY LEARNER Patient   HIGHEST LEVEL OF EDUCATION - PRIMARY LEARNER  2 YEARS OF COLLEGE   BARRIERS PRIMARY LEARNER NONE   CO-LEARNER CAREGIVER No   PRIMARY LANGUAGE ENGLISH   LEARNER PREFERENCE PRIMARY DEMONSTRATION   ANSWERED BY self   RELATIONSHIP SELF     Depression Screening:     PHQ over the last two weeks 10/18/2018   PHQ Not Done -   Little interest or pleasure in doing things Not at all   Feeling down, depressed, irritable, or hopeless Not at all   Total Score PHQ 2 0     Fall Risk Assessment:   No flowsheet data found. Abuse Screening:   No flowsheet data found. Coordination of Care Questionaire:   1. Have you been to the ER, urgent care clinic since your last visit? Hospitalized since your last visit? NO    2. Have you seen or consulted any other health care providers outside of the [address] [place] [address] since your last visit? Include any pap smears or colon screening. NO    Advanced Directive:   1. Do you have an Advanced Directive? YES    2. Would you like information on Advanced Directives?  NO

## 2019-02-04 NOTE — PROGRESS NOTES
HISTORY OF PRESENT ILLNESS  Peggy Serrano is a 63 y.o. female. Visit Vitals  /67   Pulse 96   Temp 98.2 °F (36.8 °C) (Oral)   Resp 12   Ht 5' 7\" (1.702 m)   Wt 255 lb 9.6 oz (115.9 kg)   SpO2 99%   BMI 40.03 kg/m²       She was seen here for a headache. She thought it was a sinus infection. But it was a migraine. She had 2 shots while she was here  She also having a dental problem  She was given toradol here. But the chart says reglan IM was sent to her pharmacy  She was told she needed to come in here for an injection if the imitrex did not work    She also was seen for itching and bumps on her back. Her psychiatrist had increased her wellbutrin from 150 mg to 300 mg and to use a cream. The bumps and itching started after the dose was increased. So the psychiatrist took her off of this. Also has some patches of bumps on her mid back and upper left shoulder. She was given a cream which may have helped some        Review of Systems   Constitutional: Negative. Skin: Positive for itching and rash. Psychiatric/Behavioral: Positive for depression. The patient is nervous/anxious. Physical Exam   Constitutional: She is oriented to person, place, and time. She appears well-developed and well-nourished. No distress. Cardiovascular: Normal rate and regular rhythm. Pulmonary/Chest: Effort normal and breath sounds normal.   Musculoskeletal: She exhibits no edema. Neurological: She is alert and oriented to person, place, and time. Skin: Skin is warm and dry. She is not diaphoretic. There was nothing observed on her mid back except some scratch marks. There was a patch on her left shoulder and upper back of fine papules (?follicular). Psychiatric: She has a normal mood and affect. Nursing note and vitals reviewed. ASSESSMENT and PLAN    ICD-10-CM ICD-9-CM    1. Headache disorder R51 784.0    2. Pruritus L29.9 698.9    3. Depression, unspecified depression type F32.9 311    4. Medication management Z79.899 V58.69        Discussed trial of another oral medication for headaches should they recur. It is not clear from the chart why reglan injectable was sent to her pharmacy or what other drug may have been intended by Mr. Chloe Schultz (pt says she was told something would be sent to her pharmacy. But there is no note to that effect). Discussed her talking to her psych regarding wellbutrin.  Perhaps she should restart the lower dose but she needs to discuss with her psychiatrist.    F/u early April for [address], DM, HTN

## 2019-04-04 ENCOUNTER — OFFICE VISIT (OUTPATIENT)
Dept: INTERNAL MEDICINE CLINIC | Age: 59
End: 2019-04-04

## 2019-04-04 VITALS
BODY MASS INDEX: 40.81 KG/M2 | OXYGEN SATURATION: 94 % | TEMPERATURE: 98 F | HEART RATE: 92 BPM | RESPIRATION RATE: 17 BRPM | DIASTOLIC BLOOD PRESSURE: 73 MMHG | HEIGHT: 67 IN | SYSTOLIC BLOOD PRESSURE: 118 MMHG | WEIGHT: 260 LBS

## 2019-04-04 DIAGNOSIS — E78.5 DYSLIPIDEMIA: ICD-10-CM

## 2019-04-04 DIAGNOSIS — E11.21 TYPE 2 DIABETES MELLITUS WITH NEPHROPATHY (HCC): ICD-10-CM

## 2019-04-04 DIAGNOSIS — Z76.0 MEDICATION REFILL: ICD-10-CM

## 2019-04-04 DIAGNOSIS — E66.01 SEVERE OBESITY (BMI 35.0-39.9) WITH COMORBIDITY (HCC): ICD-10-CM

## 2019-04-04 DIAGNOSIS — Z00.00 MEDICARE ANNUAL WELLNESS VISIT, SUBSEQUENT: Primary | ICD-10-CM

## 2019-04-04 DIAGNOSIS — I10 ESSENTIAL HYPERTENSION: ICD-10-CM

## 2019-04-04 DIAGNOSIS — L29.9 PRURITUS: ICD-10-CM

## 2019-04-04 DIAGNOSIS — Z23 ENCOUNTER FOR IMMUNIZATION: ICD-10-CM

## 2019-04-04 RX ORDER — ESTRADIOL 0.1 MG/G
CREAM VAGINAL
Qty: 42.5 G | Refills: 11 | Status: SHIPPED | OUTPATIENT
Start: 2019-04-04 | End: 2020-12-28 | Stop reason: SDUPTHER

## 2019-04-04 NOTE — PATIENT INSTRUCTIONS
Medicare Wellness Visit, Female     The best way to live healthy is to have a lifestyle where you eat a well-balanced diet, exercise regularly, limit alcohol use, and quit all forms of tobacco/nicotine, if applicable. Regular preventive services are another way to keep healthy. Preventive services (vaccines, screening tests, monitoring & exams) can help personalize your care plan, which helps you manage your own care. Screening tests can find health problems at the earliest stages, when they are easiest to treat. [facility] follows the current, evidence-based guidelines published by the [facility] [employer] ([facility]) when recommending preventive services for our patients. Because we follow these guidelines, sometimes recommendations change over time as research supports it. (For example, mammograms used to be recommended annually. Even though Medicare will still pay for an annual mammogram, the newer guidelines recommend a mammogram every two years for women of average risk.)  Of course, you and your doctor may decide to screen more often for some diseases, based on your risk and your health status. Preventive services for you include:  - Medicare offers their members a free annual wellness visit, which is time for you and your primary care provider to discuss and plan for your preventive service needs. Take advantage of this benefit every year!  -All adults over the age of 72 should receive the recommended pneumonia vaccines. Current USPSTF guidelines recommend a series of two vaccines for the best pneumonia protection.   -All adults should have a flu vaccine yearly and a tetanus vaccine every 10 years. All adults age 61 and older should receive a shingles vaccine once in their lifetime.    -A bone mass density test is recommended when a woman turns 65 to screen for osteoporosis. This test is only recommended one time, as a screening.  Some providers will use this same test as a disease monitoring tool if you already have osteoporosis. -All adults age 38-68 who are overweight should have a diabetes screening test once every three years.   -Other screening tests and preventive services for persons with diabetes include: an eye exam to screen for diabetic retinopathy, a kidney function test, a foot exam, and stricter control over your cholesterol.   -Cardiovascular screening for adults with routine risk involves an electrocardiogram (ECG) at intervals determined by your doctor.   -Colorectal cancer screenings should be done for adults age 54-65 with no increased risk factors for colorectal cancer. There are a number of acceptable methods of screening for this type of cancer. Each test has its own benefits and drawbacks. Discuss with your doctor what is most appropriate for you during your annual wellness visit. The different tests include: colonoscopy (considered the best screening method), a fecal occult blood test, a fecal DNA test, and sigmoidoscopy. -Breast cancer screenings are recommended every other year for women of normal risk, age 54-69.  -Cervical cancer screenings for women over age 72 are only recommended with certain risk factors.   -All adults born between [facility] should be screened once for Hepatitis C.      Here is a list of your current Health Maintenance items (your personalized list of preventive services) with a due date:  Health Maintenance Due   Topic Date Due    Shingles Vaccine (1 of 2) 06/19/2011    Pneumococcal Vaccine (1 of 1 - PPSV23) 02/06/2017    Albumin Urine Test  12/04/2018    Cholesterol Test   12/04/2018    Diabetic Foot Care  12/28/2018    Annual Well Visit  04/06/2019

## 2019-04-04 NOTE — PROGRESS NOTES
ROOM # 5    Kalpesh Galaviz presents today for   Chief Complaint   Patient presents with    Hypertension    Diabetes    Cholesterol Problem    Annual Wellness Visit       Kalpesh Galaviz preferred language for health care discussion is english/other. Is someone accompanying this pt? friend    Is the patient using any DME equipment during [address]? no    Depression Screening:  3 most recent [facility] Screens 10/18/2018 10/8/2018 9/25/2018 12/14/2017 7/18/2017 3/16/2017 12/12/2016   PHQ Not Done - - - - Active Diagnosis of Depression or Bipolar Disorder Active Diagnosis of Depression or Bipolar Disorder -   Little interest or pleasure in doing things Not at all Not at all Not at all Not at all Not at all - Several days   Feeling down, depressed, irritable, or hopeless Not at all Not at all Several days Not at all Not at all - Several days   Total Score PHQ 2 0 0 1 0 0 - 2       Learning Assessment:  Learning Assessment 11/24/2014   PRIMARY LEARNER Patient   HIGHEST LEVEL OF EDUCATION - PRIMARY LEARNER  2 YEARS [facility] PRIMARY LEARNER NONE   CO-LEARNER CAREGIVER No   PRIMARY LANGUAGE ENGLISH   LEARNER PREFERENCE PRIMARY DEMONSTRATION   ANSWERED BY self   RELATIONSHIP SELF       Abuse Screening:  No flowsheet data found. Fall Risk  No flowsheet data found. Health Maintenance reviewed and discussed per provider. Yes    Kalpesh Galaviz is due for   Health Maintenance Due   Topic Date Due    Shingrix Vaccine Age 50> (1 of 2) 06/19/2011    Pneumococcal 0-64 years (1 of 1 - PPSV23) 02/06/2017    MICROALBUMIN Q1  12/04/2018    LIPID PANEL Q1  12/04/2018    FOOT EXAM Q1  12/28/2018    MEDICARE YEARLY EXAM  04/06/2019     Please order/place referral if appropriate. Advance Directive:  1. Do you have an advance directive in place? Patient Reply: no    2. If not, would you like material regarding how to put one in place? Patient Reply: no    Coordination of Care:  1.  Have you been to the ER, urgent care clinic since your last visit? Hospitalized since your last visit? no    2. Have you seen or consulted any other health care providers outside of the [address] [place] [address] since your last visit? Include any pap smears or colon screening.  Dr. Janis George and Aquiles Sanz

## 2019-04-04 NOTE — PROGRESS NOTES
HISTORY OF PRESENT ILLNESS  Nat Adams is a 63 y.o. female. Visit Vitals  /73 (BP 1 Location: Left arm, BP Patient Position: Sitting)   Pulse 92   Temp 98 °F (36.7 °C) (Oral)   Resp 17   Ht 5' 7\" (1.702 m)   Wt 260 lb (117.9 kg)   SpO2 94%   BMI 40.72 kg/m²               Current Outpatient Medications:  SUMAtriptan succinate 6 mg/0.5 mL kit, USE 6 MG BY SUBCUTANEOUS ROUTE ONCE AS NEEDED FOR MIGRAINE  triamcinolone acetonide (KENALOG) 0.025 % topical cream, Apply  to affected area two (2) times a day. use thin layer  enalapril (VASOTEC) 20 mg tablet, take 1 tablet by mouth once daily  AMITIZA 8 mcg capsule, take 2 capsules by mouth twice a day WITH MEALS  CONSTULOSE 10 gram/15 mL solution, take 5 milliliters by mouth twice a day if needed  cyanocobalamin 1,000 mcg tablet, Take 1 Tab by Mouth Once a Day. omeprazole (PRILOSEC) 20 mg capsule,   Insulin Needles, Disposable, (BD ULTRA-FINE MINI PEN NEEDLE) 31 gauge x 3/16\" ndle, BD Ultra-Fine Mini Pen Needle 31 gauge x 3/16\"  lancets (ONETOUCH DELICA LANCETS) 30 gauge misc, OneTouch Delica Lancets 30 gauge  EVELIA PEN NEEDLE 32 gauge x 5/32\" ndle, inject once daily  VICTOZA 3-KIRK 0.6 mg/0.1 mL (18 mg/3 mL) pnij, inject 0.6 milligrams subcutaneously daily for 7 days then 1.2 mi...  (REFER TO PRESCRIPTION NOTES). NYAMYC powder, APPLY THIN LAYER UNDER BREASTS TWICE A DAY AS NEED FOR FLARE  carvedilol (COREG) 12.5 mg tablet, take 1 tablet by mouth twice a day  DULoxetine (CYMBALTA) 60 mg capsule, 150 mg daily. hydrOXYzine (ATARAX) 50 mg tablet, take 1 tablet by mouth every 12 hours  multivitamin (ONE A DAY) tablet, Take 1 Tab by mouth daily. spironolactone (ALDACTONE) 25 mg tablet, Take 25 mg by mouth daily. coenzyme q10-vitamin e (COQ10 ) 100-100 mg-unit cap, Take  by mouth two (2) times a day. clonazepam (KLONOPIN) 0.5 mg tablet, Take  by mouth two (2) times a day. rosuvastatin (CRESTOR) 40 mg tablet, Take 40 mg by mouth daily.  NON FORMULARY fenofibrate micronized (LOFIBRA) 134 mg capsule, Take  by mouth every morning. aspirin 81 mg chewable tablet, Take 81 mg by mouth daily. CALCIUM CARBONATE/VITAMIN D3 (CALCIUM 600 + D,3, PO), Take 1 Cap by mouth daily. acetaminophen (TYLENOL EXTRA STRENGTH) 500 mg tablet, Take 1 Tab by mouth every six (6) hours as needed for Pain. acetaminophen (TYLENOL) 325 mg tablet, 350 mg.  estradiol (ESTRACE) 0.01 % (0.1 mg/gram) vaginal cream, Estrace 0.01% (0.1 mg/gram) vaginal cream   INSERT 2 GRAM INTO VAGINA DAILY. APPLY FINGERTIP AMOUNT TO OUTSIDE OF VAGINAL OPENING  oxyCODONE IR (ROXICODONE) 5 mg immediate release tablet, 5 mg.  traMADol (ULTRAM) 50 mg tablet, 50 mg.  acetaminophen (TYLENOL) 500 mg tablet, Take 2 Tabs by mouth two (2) times daily as needed for Pain. ondansetron (ZOFRAN ODT) 8 mg disintegrating tablet, take 1 tablet by mouth every 8 hours if needed for nausea  cholecalciferol (VITAMIN D3) 1,000 unit cap, Take by oral route. BD INSULIN PEN NEEDLE UF MINI 31 x 3/16 \" ndle,   polyethylene glycol (MIRALAX) 17 gram/dose powder, Take 17 g by mouth daily. No current facility-administered medications for this visit. Hypertension    The history is provided by the patient. This is a chronic problem. The current episode started more than 1 week ago. The problem has not changed since onset. Pertinent negatives include no chest pain, no palpitations and no shortness of breath. There are no associated agents to hypertension. Risk factors include obesity, postmenopause, dyslipidemia and diabetes mellitus. Diabetes   The history is provided by the patient. This is a chronic problem. The current episode started more than 1 week ago. The problem occurs daily. The problem has not changed since onset. Pertinent negatives include no chest pain and no shortness of breath. Exacerbated by: diet. The symptoms are relieved by medications (diet). Review of Systems   Constitutional: Negative.     Respiratory: Negative for shortness of breath. Cardiovascular: Negative for chest pain and palpitations. Musculoskeletal: Positive for joint pain and myalgias. Skin: Positive for itching (saw dermatology). Neurological: Negative. Physical Exam   Constitutional: She is oriented to person, place, and time. She appears well-developed and well-nourished. No distress. Cardiovascular: Normal rate and regular rhythm. Pulmonary/Chest: Effort normal and breath sounds normal.   Musculoskeletal: She exhibits no edema. Neurological: She is alert and oriented to person, place, and time. Diabetic foot exam:     Left Foot:  Diminished reflex   Visual Exam: few early hammer toes   Pulse DP: 2+ (normal)   Filament test: normal sensation    Vibratory sensation: normal      Right Foot: diminished reflex   Visual Exam: few early hammer toes   Pulse DP: 2+ (normal)   Filament test: normal sensation    Vibratory sensation: normal     Skin: Skin is warm and dry. She is not diaphoretic. Psychiatric: She has a normal mood and affect. Nursing note and vitals reviewed. ASSESSMENT and PLAN    ICD-10-CM ICD-9-CM                        3. Type 2 diabetes mellitus with nephropathy (HCC) E11.21 250.40      583.81    4. Essential hypertension I10 401.9    5. Dyslipidemia E78.5 272.4    6. Pruritus L29.9 698.9 REFERRAL TO ALLERGY   7. Medication refill Z76.0 V68.1 estradiol (ESTRACE) 0.01 % (0.1 mg/gram) vaginal cream        BP controlled    DM controlled--see media, lab from February    F/u with her various specialists    Refer to allergist for pruritus    Discussed BMI/weight, lifestyle, diet and exercise. Discussed effect on blood pressure, blood sugar, and joints especially  Focus on limiting white carbs, portion control, and moving more.     F/u 6 months            The following is a separate encounter visit:    This is the Subsequent Medicare Annual Wellness Exam, performed 12 months or more after the Initial AWV or the last Subsequent AWV    I have reviewed the patient's medical history in detail and updated the computerized patient record. History     Past Medical History:   Diagnosis Date    Abnormal bone density screening 3/10/2011    Anemia 1996    Arthritis     Baker's cyst     left    Cardiomyopathy (HCC)     Chicken pox     Constipation     Constipation due to pain medication     CTS (carpal tunnel syndrome) 1999    bilat.  Depression 2000    [facility] [address]     Diabetes mellitus     type 2 diabetes mellitus without complication, without long-term current use of insulin. type 2 diabetes mellitus with nephropathy     Disorder of urinary tract     Dysfunctional voiding of urine     Dyslipidemia     Essential hypertension     Fatigue     multifactorial    Fibromyalgia 2000    GERD (gastroesophageal reflux disease)     H/O bone density study 04/03/2017    osteopenia, SEE MEDIA    Hematuria     History of meniscal tear     HTN (hypertension)     Incontinence     Metacarpal bone fracture 2003    left 5th    Migraine     aura without headache    Nephropathy     due to diabetes?  OAB (overactive bladder)     documented on UDS 2009    Obesity     Osteopenia 03/2006    Plantar fasciitis 1996    Pre-syncope 7/19/16    Primary osteoarthritis of left knee     Renal insufficiency     PER DR. JARAD WARREN    Restless leg     Severe obesity (BMI 35.0-39. 9) with comorbidity ([facility] [address]     Sleep apnea     C-PAP    Type II or unspecified type diabetes mellitus with neurological manifestations, uncontrolled(250.62) ([facility])     Unspecified hereditary and idiopathic peripheral neuropathy     Urge incontinence     Urinary incontinence     unspec.      Urinary tract infection, site not specified     Vitamin D deficiency       Past Surgical History:   Procedure Laterality Date    CARDIAC CATHETERIZATION  04/2006    EGD  08/2008    HX ARTHROTOMY Left 01/2015    upper arm     HX CARPAL TUNNEL RELEASE Left 2016    wrist     HX  SECTION      x2    HX CHOLECYSTECTOMY      HX COLONOSCOPY  2014    HX ENDOSCOPY      ENDOSCOPY,COLON, DIAGNOSTIC    HX KNEE ARTHROSCOPY Left 2018    HX KNEE ARTHROSCOPY  2018    HX KNEE REPLACEMENT Left 2018    HX OTHER SURGICAL Left 2015    SHOULDER REPAIR     Current Outpatient Medications   Medication Sig Dispense Refill    estradiol (ESTRACE) 0.01 % (0.1 mg/gram) vaginal cream INSERT 2 GRAM INTO VAGINA DAILY. APPLY FINGERTIP AMOUNT TO OUTSIDE OF VAGINAL OPENING 42.5 g 11    SUMAtriptan succinate 6 mg/0.5 mL kit USE 6 MG BY SUBCUTANEOUS ROUTE ONCE AS NEEDED FOR MIGRAINE 1 Kit 11    triamcinolone acetonide (KENALOG) 0.025 % topical cream Apply  to affected area two (2) times a day. use thin layer 15 g 5    enalapril (VASOTEC) 20 mg tablet take 1 tablet by mouth once daily 30 Tab 5    AMITIZA 8 mcg capsule take 2 capsules by mouth twice a day WITH MEALS 120 Cap 5    CONSTULOSE 10 gram/15 mL solution take 5 milliliters by mouth twice a day if needed 236 mL 1    cyanocobalamin 1,000 mcg tablet Take 1 Tab by Mouth Once a Day.  omeprazole (PRILOSEC) 20 mg capsule   0    Insulin Needles, Disposable, (BD ULTRA-FINE MINI PEN NEEDLE) 31 gauge x 3/16\" ndle BD Ultra-Fine Mini Pen Needle 31 gauge x 3/16\"      lancets (ONETOUCH DELICA LANCETS) 30 gauge misc OneTouch Delica Lancets 30 gauge      EVELIA PEN NEEDLE 32 gauge x 5/32\" ndle inject once daily  0    VICTOZA 3-KIRK 0.6 mg/0.1 mL (18 mg/3 mL) pnij inject 0.6 milligrams subcutaneously daily for 7 days then 1.2 mi...  (REFER TO PRESCRIPTION NOTES). 0    NYAMYC powder APPLY THIN LAYER UNDER BREASTS TWICE A DAY AS NEED FOR FLARE 60 g 11    carvedilol (COREG) 12.5 mg tablet take 1 tablet by mouth twice a day  0    DULoxetine (CYMBALTA) 60 mg capsule 150 mg daily.   0    hydrOXYzine (ATARAX) 50 mg tablet take 1 tablet by mouth every 12 hours  0    multivitamin (ONE A DAY) tablet Take 1 Tab by mouth daily.  spironolactone (ALDACTONE) 25 mg tablet Take 25 mg by mouth daily.  coenzyme q10-vitamin e (COQ10 ) 100-100 mg-unit cap Take  by mouth two (2) times a day.  clonazepam (KLONOPIN) 0.5 mg tablet Take  by mouth two (2) times a day.  rosuvastatin (CRESTOR) 40 mg tablet Take 40 mg by mouth daily. NON FORMULARY       fenofibrate micronized (LOFIBRA) 134 mg capsule Take  by mouth every morning.  aspirin 81 mg chewable tablet Take 81 mg by mouth daily.  CALCIUM CARBONATE/VITAMIN D3 (CALCIUM 600 + D,3, PO) Take 1 Cap by mouth daily.  acetaminophen (TYLENOL EXTRA STRENGTH) 500 mg tablet Take 1 Tab by mouth every six (6) hours as needed for Pain. 30 Tab 0     Allergies   Allergen Reactions    Digoxin Nausea Only    Niaspan [Niacin] Rash    Wellbutrin [Bupropion Hcl] Itching     Family History   Problem Relation Age of Onset    Hypertension Mother     Heart Disease Mother     Kidney Disease Mother     Heart Disease Father     Emphysema Father      Social History     Tobacco Use    Smoking status: Never Smoker    Smokeless tobacco: Never Used   Substance Use Topics    Alcohol use: No     Patient Active Problem List   Diagnosis Code    Migraine aura without headache G43. 109    Fatigue R53.83    Urge incontinence N39.41    OAB (overactive bladder) N32.81    Disorder of urinary tract N39.9    Constipation K59.00    Urinary incontinence R32    Dysfunctional voiding of urine N39.8    Hematuria R31.9    Type 2 diabetes mellitus without complication, without long-term current use of insulin (HCC) E11.9    Dyslipidemia E78.5    Essential hypertension I10    Type 2 diabetes mellitus with nephropathy (HCC) E11.21    Severe obesity (BMI 35.0-39. 9) with comorbidity ([facility] [address] E66.01       Depression Risk Factor Screening:     3 most recent PHQ Screens 10/18/2018   PHQ Not Done -   Little interest or pleasure in doing things Not at all   Feeling down, depressed, irritable, or hopeless Not at all   Total Score PHQ 2 0     Alcohol Risk Factor Screening: You do not drink alcohol or very rarely. Functional Ability and Level of Safety:   Hearing Loss  Hearing is good. Activities of Daily Living  The home contains: no safety equipment. Patient does total self care    Fall Risk  No flowsheet data found. Abuse Screen  Patient is not abused    Cognitive Screening   Evaluation of Cognitive Function:  Has your family/caregiver stated any concerns about your memory: no  Normal, Mini Cog test    Patient Care Team   Patient Care Team:  Hilaria Davenport MD as PCP - General (Internal Medicine)  Jay Feldman MD as Consulting Provider (Endocrinology)  Tiffanie Cabrales MD as Consulting Provider (Obstetrics & Gynecology)  Jeanie, Marly Chacon MD as Consulting Provider (Orthopedic Surgery)  Genora Ahumada, MD as Consulting Provider (Urology)  Jody Sears MD as Physician (Optometry)  Anton Bradford MD as Consulting Provider (Orthopedic Surgery)  Luis Alfonso MD as Physician (Gastroenterology)  Matthew Tavarez MD as Consulting Provider (Cardio Vascular Surgery)    Assessment/Plan   Education and counseling provided:  Are appropriate based on today's review and evaluation    Diagnoses and all orders for this visit:    1. Medicare annual wellness visit, subsequent    2.  Encounter for immunization  -     ADMIN PNEUMOCOCCAL VACCINE  -     PNEUMOCOCCAL POLYSACCHARIDE VACCINE, 23-VALENT, ADULT OR IMMUNOSUPPRESSED PT DOSE,        Health Maintenance Due   Topic Date Due    Shingrix Vaccine Age 49> (1 of 2) 06/19/2011    Pneumococcal 0-64 years (1 of 1 - PPSV23) 02/06/2017    MICROALBUMIN Q1  12/04/2018    LIPID PANEL Q1  12/04/2018    FOOT EXAM Q1  12/28/2018    MEDICARE YEARLY EXAM  04/06/2019

## 2019-04-29 RX ORDER — NYSTATIN 100000 [USP'U]/G
POWDER TOPICAL
Qty: 60 G | Refills: 11 | Status: SHIPPED | OUTPATIENT
Start: 2019-04-29 | End: 2020-07-22

## 2019-07-11 DIAGNOSIS — K59.04 CHRONIC IDIOPATHIC CONSTIPATION: ICD-10-CM

## 2019-07-11 RX ORDER — LUBIPROSTONE 8 UG/1
CAPSULE, GELATIN COATED ORAL
Qty: 120 CAP | Refills: 5 | Status: SHIPPED | OUTPATIENT
Start: 2019-07-11 | End: 2019-08-28 | Stop reason: SDUPTHER

## 2019-07-27 DIAGNOSIS — Z76.0 MEDICATION REFILL: ICD-10-CM

## 2019-07-28 RX ORDER — ENALAPRIL MALEATE 20 MG/1
TABLET ORAL
Qty: 30 TAB | Refills: 5 | Status: SHIPPED | OUTPATIENT
Start: 2019-07-28 | End: 2020-01-06

## 2019-08-21 ENCOUNTER — TELEPHONE (OUTPATIENT)
Dept: INTERNAL MEDICINE CLINIC | Age: 59
End: 2019-08-21

## 2019-08-21 DIAGNOSIS — K59.04 CHRONIC IDIOPATHIC CONSTIPATION: ICD-10-CM

## 2019-08-22 NOTE — TELEPHONE ENCOUNTER
[employer] is reviewing your PA request and will respond within 24 hours for Medicaid or up to 72 hours based on the required timeframe determined by state or federal regulations.

## 2019-08-24 NOTE — TELEPHONE ENCOUNTER
Received fax from patient insurance that they are unable to approve this request because coverage for additional drug quantities is provided in situations where the patient requires a dose reduction to 16 mcg twice daily. Coverage will continue with the plans base limit quantity. Coverage for additional quantities cannot be authorized at this time. I believe that patient plan may cover Amitiza 16 mcg capsules by mouth twice a day, instead of two 8 mcg capsules by mouth twice daily?     May speak with a pharmacist or physician at [phone]

## 2019-08-24 NOTE — TELEPHONE ENCOUNTER
Pt complained that the 8 mcg did not work but the 24 mcg was too strong. Please let pt know that the way she wants it is not a covered benefit.

## 2019-08-28 ENCOUNTER — TELEPHONE (OUTPATIENT)
Dept: INTERNAL MEDICINE CLINIC | Age: 59
End: 2019-08-28

## 2019-08-28 DIAGNOSIS — K59.04 CHRONIC IDIOPATHIC CONSTIPATION: ICD-10-CM

## 2019-08-28 RX ORDER — LUBIPROSTONE 24 UG/1
24 CAPSULE, GELATIN COATED ORAL
Qty: 90 CAP | Refills: 4 | Status: SHIPPED | OUTPATIENT
Start: 2019-08-28 | End: 2020-10-13 | Stop reason: SDUPTHER

## 2019-08-28 RX ORDER — LUBIPROSTONE 8 UG/1
CAPSULE, GELATIN COATED ORAL
Qty: 90 CAP | Refills: 4 | Status: SHIPPED | OUTPATIENT
Start: 2019-08-28 | End: 2020-09-04

## 2019-08-28 NOTE — TELEPHONE ENCOUNTER
After discussing with patient  Will try ordering her amitiza as 24 mcg with breakfast and 8 mcg with supper. That will equal the total 32 mcg that she is taking. Insurance has not wanted to cover two 8 mcgs BID.

## 2019-08-30 NOTE — TELEPHONE ENCOUNTER
Late entry:  Patient contacted office regarding PA.  2 patient identifiers confirmed. Patient was advised of below per Dr Vega Bob.  Patient states she cannot recall being on 24 mcg dose. Patient came into office to discuss medication and prior auth. Patient spoke with this nurse and Dr Vega Bob.  Patient was informed by Dr Vega Bob that 24 mcg dose and 8 mcg will be sent to pharm to see if insurance will cover. Patient verbalized understanding.

## 2019-09-03 ENCOUNTER — TELEPHONE (OUTPATIENT)
Dept: INTERNAL MEDICINE CLINIC | Age: 59
End: 2019-09-03

## 2019-09-10 ENCOUNTER — TELEPHONE (OUTPATIENT)
Dept: FAMILY MEDICINE CLINIC | Facility: CLINIC | Age: 59
End: 2019-09-10

## 2019-09-10 NOTE — TELEPHONE ENCOUNTER
Returned call to pt to discuss complaint. Pt spent 30 minutes on phone explaining that Terri (PSR) hung up on her. She stated that she came to the office later that day and said Trina Gonzales was hiding from her. I appears that the pt came to the office to confront Trina Gonzales. Pt said her prior authorization was taken care of by the staff in a satisfactory manner but needs an apology from Trina Gonzales by Monday or she will take this issue to the next level. I thanked the pt for sharing her experience with me and told her I would discuss the incident with Terri.

## 2019-10-08 ENCOUNTER — OFFICE VISIT (OUTPATIENT)
Dept: INTERNAL MEDICINE CLINIC | Age: 59
End: 2019-10-08

## 2019-10-08 VITALS
OXYGEN SATURATION: 98 % | DIASTOLIC BLOOD PRESSURE: 80 MMHG | TEMPERATURE: 97.8 F | BODY MASS INDEX: 41.75 KG/M2 | SYSTOLIC BLOOD PRESSURE: 121 MMHG | HEIGHT: 67 IN | RESPIRATION RATE: 22 BRPM | WEIGHT: 266 LBS | HEART RATE: 94 BPM

## 2019-10-08 DIAGNOSIS — M25.569 CHRONIC KNEE PAIN, UNSPECIFIED LATERALITY: ICD-10-CM

## 2019-10-08 DIAGNOSIS — Z23 ENCOUNTER FOR IMMUNIZATION: ICD-10-CM

## 2019-10-08 DIAGNOSIS — G89.29 CHRONIC KNEE PAIN, UNSPECIFIED LATERALITY: ICD-10-CM

## 2019-10-08 DIAGNOSIS — I10 ESSENTIAL HYPERTENSION: ICD-10-CM

## 2019-10-08 DIAGNOSIS — K59.04 CHRONIC IDIOPATHIC CONSTIPATION: ICD-10-CM

## 2019-10-08 DIAGNOSIS — R42 VERTIGO: Primary | ICD-10-CM

## 2019-10-08 DIAGNOSIS — E11.21 TYPE 2 DIABETES MELLITUS WITH NEPHROPATHY (HCC): ICD-10-CM

## 2019-10-08 DIAGNOSIS — E78.5 DYSLIPIDEMIA: ICD-10-CM

## 2019-10-08 RX ORDER — MECLIZINE HYDROCHLORIDE 25 MG/1
TABLET ORAL
Refills: 0 | COMMUNITY
Start: 2019-09-20 | End: 2019-10-10 | Stop reason: SDUPTHER

## 2019-10-08 RX ORDER — ESOMEPRAZOLE MAGNESIUM 20 MG/1
20 TABLET, DELAYED RELEASE ORAL 2 TIMES DAILY
Qty: 60 TAB | Refills: 1 | Status: SHIPPED | OUTPATIENT
Start: 2019-10-08 | End: 2019-10-17 | Stop reason: CLARIF

## 2019-10-08 RX ORDER — DICLOFENAC SODIUM 10 MG/G
2 GEL TOPICAL 4 TIMES DAILY
Qty: 100 G | Refills: 5 | Status: SHIPPED | OUTPATIENT
Start: 2019-10-08

## 2019-10-08 RX ORDER — NAPROXEN 375 MG/1
375 TABLET ORAL 2 TIMES DAILY WITH MEALS
Qty: 60 TAB | Refills: 1 | Status: SHIPPED | OUTPATIENT
Start: 2019-10-08 | End: 2020-06-04

## 2019-10-08 NOTE — PROGRESS NOTES
HISTORY OF PRESENT ILLNESS  Lila Lenz is a 63 y.o. female. Visit Vitals  /80 (BP 1 Location: Left arm, BP Patient Position: Sitting)   Pulse 94   Temp 97.8 °F (36.6 °C) (Oral)   Resp 22   Ht 5' 7\" (1.702 m)   Wt 266 lb (120.7 kg)   SpO2 98%   BMI 41.66 kg/m²       She has been having a lot of vertigo. Went to Urgent Care Sept 20 and was given meclizine. She reports sxs got better, then returned when she stopped the meclizine. Gets some pain in her scalp on occiput    Reports right hand is often still and occasional tingling in thumb and 2nd and 3rd fingers. She already had a carpal tunnel release. Skin rashes that won't clear. Her knees hurt and she wants info regarding voltaren gel or vimovo (combo nexium and naproxen)    [address] the [address]      Review of Systems   Eyes: Positive for blurred vision. Neurological: Positive for dizziness. Physical Exam   Constitutional: She is oriented to person, place, and time. She appears well-developed and well-nourished. No distress. HENT:   Right Ear: Tympanic membrane, external ear and ear canal normal.   Left Ear: Tympanic membrane, external ear and ear canal normal.   Cardiovascular: Normal rate and regular rhythm. Pulmonary/Chest: Effort normal and breath sounds normal.   Musculoskeletal: She exhibits no edema. Right hand has some minor joint changes c/w mild osteoarthritis. Neg Tinel's. Good  and radial pulse   Neurological: She is alert and oriented to person, place, and time. Skin: Skin is warm and dry. She is not diaphoretic. Scattered 1-2 mm hyperpigmented macules   Psychiatric: She has a normal mood and affect. Nursing note and vitals reviewed. ASSESSMENT and PLAN    ICD-10-CM ICD-9-CM    1. Vertigo R42 780.4 REFERRAL TO ENT-OTOLARYNGOLOGY   2.  Chronic knee pain, unspecified laterality M25.569 719.46 diclofenac (VOLTAREN) 1 % gel    G89.29 338.29 naproxen (NAPROSYN) 375 mg tablet esomeprazole magnesium (NEXIUM 24HR) 20 mg TbEC   3. Type 2 diabetes mellitus with nephropathy (HCC) E11.21 250.40 CANCELED: METABOLIC PANEL, BASIC     583.81 CANCELED: HEMOGLOBIN A1C W/O EAG      CANCELED: LIPID PANEL   4. Essential hypertension I10 401.9 CANCELED: METABOLIC PANEL, BASIC      CANCELED: LIPID PANEL   5. Dyslipidemia E78.5 272.4 CANCELED: LIPID PANEL   6. Chronic idiopathic constipation K59.04 564.00    7. Encounter for immunization Z23 V03.89 INFLUENZA VACCINE INACTIVATED (IIV), SUBUNIT, ADJUVANTED, IM      ADMIN INFLUENZA VIRUS VAC       Will refer to [facility]. Her sxs may hve multiple components but also may be vestibular    RX voltaren gel. Nexium Plus naproxen ( NSAIDs usually bother her stomach)    Most of her complaints are very vague and general in nature and related to normal changes of aging and wear and tear. sees multiple doctors including endocrinologist    Discussed BMI/weight, lifestyle, diet and exercise. Discussed effect on blood pressure, blood sugar, and joints especially  Focus on limiting white carbs, portion control, and moving more. She has never really addressed her weight.    Many of her meds also make it harder to lose weight    F/u 6 months

## 2019-10-08 NOTE — PROGRESS NOTES
ROOM # 4    Lila Lenz presents today for   Chief Complaint   Patient presents with    Cholesterol Problem    Hypertension    Diabetes       Lila Lenz preferred language for health care discussion is english/other. Is someone accompanying this pt? father    Is the patient using any DME equipment during [address]? no    Depression Screening:  3 most recent [facility] Screens 10/18/2018 10/8/2018 9/25/2018 12/14/2017 7/18/2017 3/16/2017 12/12/2016   PHQ Not Done - - - - Active Diagnosis of Depression or Bipolar Disorder Active Diagnosis of Depression or Bipolar Disorder -   Little interest or pleasure in doing things Not at all Not at all Not at all Not at all Not at all - Several days   Feeling down, depressed, irritable, or hopeless Not at all Not at all Several days Not at all Not at all - Several days   Total Score PHQ 2 0 0 1 0 0 - 2       Learning Assessment:  Learning Assessment 11/24/2014   PRIMARY LEARNER Patient   HIGHEST LEVEL OF EDUCATION - PRIMARY LEARNER  2 YEARS [facility] PRIMARY LEARNER NONE   CO-LEARNER CAREGIVER No   PRIMARY LANGUAGE ENGLISH   LEARNER PREFERENCE PRIMARY DEMONSTRATION   ANSWERED BY self   RELATIONSHIP SELF       Abuse Screening:  No flowsheet data found. Fall Risk  No flowsheet data found. Health Maintenance reviewed and discussed per provider. Yes    Lila Lenz is due for   Health Maintenance Due   Topic Date Due    Shingrix Vaccine Age 50> (1 of 2) 06/19/2011    FOOT EXAM Q1  12/28/2018    Influenza Age 9 to Adult  08/01/2019     Please order/place referral if appropriate. Advance Directive:  1. Do you have an advance directive in place? Patient Reply: no    2. If not, would you like material regarding how to put one in place? Patient Reply: no    Coordination of Care:  1. Have you been to the ER, urgent care clinic since your last visit? Hospitalized since your last visit? yes    2.  Have you seen or consulted any other health care providers outside of the [address] [place] [address] since your last visit? Include any pap smears or colon screening.  no

## 2019-10-10 ENCOUNTER — TELEPHONE (OUTPATIENT)
Dept: INTERNAL MEDICINE CLINIC | Age: 59
End: 2019-10-10

## 2019-10-10 NOTE — TELEPHONE ENCOUNTER
covermymeds is requesting a prior auth for Diclofenac sodium 1% gel and Esomeprazole Magnesium 20 mg capsules

## 2019-10-14 NOTE — TELEPHONE ENCOUNTER
[employer] is reviewing your PA request and will respond within 24 hours for Medicaid or up to 72 hours for non-Medicaid plans, based on the required timeframe determined by state or federal regulations. Prior auth for diclofenac (VOLTAREN) 1 % gel   Approved today   YYESEL:[id number];FFMBND:[id number]; Review Type:Prior Auth; Coverage Start Date:09/14/2019; Coverage End Date:10/13/2020;

## 2019-10-17 RX ORDER — ESOMEPRAZOLE MAGNESIUM 40 MG/1
40 CAPSULE, DELAYED RELEASE ORAL DAILY
Qty: 90 CAP | Refills: 1 | Status: SHIPPED | OUTPATIENT
Start: 2019-10-17 | End: 2020-07-08 | Stop reason: ALTCHOICE

## 2019-10-17 NOTE — TELEPHONE ENCOUNTER
Prior auth for Esomeprazole magnesium 20 mg capsule has been denied. The quantity requested exceeds the plans limitation of 30 capsules per dispensing. Quantity overrides are available for patients who have symptoms that cannot be controlled by Nexium 40 mg once daily. For questions or wish to discuss with pharmacist or physician please call [phone].

## 2019-10-17 NOTE — TELEPHONE ENCOUNTER
Please advise pt of this and advise I will need to send the 40 mg daily to her pharmacy and she will need to try this first.

## 2019-10-18 NOTE — TELEPHONE ENCOUNTER
Attempted to contact patient at  number, no answer. Lvm for patient to return call to office at [phone]. Will continue to try to contact patient.

## 2019-10-23 NOTE — TELEPHONE ENCOUNTER
Patient contacted at home number. 2 patient identifiers confirmed. Patient informed of below. Patient verbalized understanding. Patient advised medication was sent 10/17/19 to [facility] on [address]. No other questions at this time.

## 2020-01-06 DIAGNOSIS — Z76.0 MEDICATION REFILL: ICD-10-CM

## 2020-01-06 RX ORDER — ENALAPRIL MALEATE 20 MG/1
TABLET ORAL
Qty: 30 TAB | Refills: 5 | Status: SHIPPED | OUTPATIENT
Start: 2020-01-06 | End: 2020-07-01

## 2020-01-07 ENCOUNTER — OFFICE VISIT (OUTPATIENT)
Dept: INTERNAL MEDICINE CLINIC | Age: 60
End: 2020-01-07

## 2020-01-07 VITALS
WEIGHT: 264 LBS | HEART RATE: 96 BPM | OXYGEN SATURATION: 98 % | RESPIRATION RATE: 22 BRPM | HEIGHT: 67 IN | SYSTOLIC BLOOD PRESSURE: 109 MMHG | TEMPERATURE: 97.8 F | DIASTOLIC BLOOD PRESSURE: 67 MMHG | BODY MASS INDEX: 41.44 KG/M2

## 2020-01-07 DIAGNOSIS — J01.90 SUBACUTE SINUSITIS, UNSPECIFIED LOCATION: Primary | ICD-10-CM

## 2020-01-07 DIAGNOSIS — M89.9 DISORDER OF BONE AND ARTICULAR CARTILAGE: ICD-10-CM

## 2020-01-07 DIAGNOSIS — M85.80 OSTEOPENIA, UNSPECIFIED LOCATION: ICD-10-CM

## 2020-01-07 DIAGNOSIS — M94.9 DISORDER OF BONE AND ARTICULAR CARTILAGE: ICD-10-CM

## 2020-01-07 RX ORDER — DULOXETIN HYDROCHLORIDE 30 MG/1
CAPSULE, DELAYED RELEASE ORAL
COMMUNITY
Start: 2019-12-16 | End: 2022-04-18

## 2020-01-07 NOTE — PROGRESS NOTES
ROOM # 5    Sonal Camacho presents today for   Chief Complaint   Patient presents with    Head Pain       Sonal Camacho preferred language for health care discussion is english/other. Is someone accompanying this pt? no    Is the patient using any DME equipment during OV? no    Depression Screening:  3 most recent [facility] 36 Screens 10/18/2018 10/8/2018 9/25/2018 12/14/2017 7/18/2017 3/16/2017 12/12/2016   PHQ Not Done - - - - Active Diagnosis of Depression or Bipolar Disorder Active Diagnosis of Depression or Bipolar Disorder -   Little interest or pleasure in doing things Not at all Not at all Not at all Not at all Not at all - Several days   Feeling down, depressed, irritable, or hopeless Not at all Not at all Several days Not at all Not at all - Several days   Total Score PHQ 2 0 0 1 0 0 - 2       Learning Assessment:  Learning Assessment 11/24/2014   PRIMARY LEARNER Patient   HIGHEST LEVEL OF EDUCATION - PRIMARY LEARNER  2 YEARS [facility] PRIMARY LEARNER NONE   CO-LEARNER CAREGIVER No   PRIMARY LANGUAGE ENGLISH   LEARNER PREFERENCE PRIMARY DEMONSTRATION   ANSWERED BY self   RELATIONSHIP SELF       Abuse Screening:  No flowsheet data found. Fall Risk  No flowsheet data found. Health Maintenance reviewed and discussed per provider. Yes    Sonal Camacho is due for   Health Maintenance Due   Topic Date Due    Shingrix Vaccine Age 50> (1 of 2) 06/19/2011    FOOT EXAM Q1  12/28/2018    MICROALBUMIN Q1  01/02/2020    LIPID PANEL Q1  01/30/2020     Please order/place referral if appropriate. Advance Directive:  1. Do you have an advance directive in place? Patient Reply: no    2. If not, would you like material regarding how to put one in place? Patient Reply: n    Coordination of Care:  1. Have you been to the ER, urgent care clinic since your last visit? Hospitalized since your last visit? no    2.  Have you seen or consulted any other health care providers outside of the [address] [place] [address] [place] since your last visit? Include any pap smears or colon screening.  ENT

## 2020-01-07 NOTE — PROGRESS NOTES
HISTORY OF PRESENT ILLNESS  My Kumar is a 63 y.o. female. /67 (BP 1 Location: Left arm, BP Patient Position: Sitting)   Pulse 96   Temp 97.8 °F (36.6 °C) (Oral)   Resp 22   Ht 5' 7\" (1.702 m)   Wt 264 lb (119.7 kg)   SpO2 98%   BMI 41.35 kg/m²     Has had some Facial pain and her 2 front teeth hurt  She has been coughing up some phlegm. It is yellowish. She has been getting some nasal drainage today    She is already on augmentin for a urinary complaint      Since last visit, she saw Dr. Jigar Roberson. She has some vestibular issue with the left ear and is set for an MRI tomorrow    Sinus Pain    The history is provided by the patient. This is a new problem. The current episode started more than 1 week ago. Associated symptoms include congestion and cough (some). Pertinent negatives include no chills, no ear pain, no sore throat and no chest pain. Review of Systems   Constitutional: Positive for diaphoresis. Negative for chills and fever. HENT: Positive for congestion and sinus pain. Negative for ear pain and sore throat. Respiratory: Positive for cough (some) and sputum production. Cardiovascular: Negative for chest pain. Physical Exam  Vitals signs and nursing note reviewed. Constitutional:       General: She is not in acute distress. Appearance: She is well-developed. She is not diaphoretic. HENT:      Right Ear: Tympanic membrane, ear canal and external ear normal.      Left Ear: Tympanic membrane, ear canal and external ear normal.      Nose: Mucosal edema present. Mouth/Throat:      Mouth: Mucous membranes are moist.      Pharynx: Oropharynx is clear. Comments: Mild posterior drainage noted. Cardiovascular:      Rate and Rhythm: Normal rate and regular rhythm. Pulmonary:      Effort: Pulmonary effort is normal.      Breath sounds: Normal breath sounds. Skin:     General: Skin is warm and dry.    Neurological:      Mental Status: She is alert and oriented to person, place, and time. ASSESSMENT and PLAN    ICD-10-CM ICD-9-CM    1. Subacute sinusitis, unspecified location J01.90 461.9    2. Osteopenia, unspecified location M85.80 733.90 DEXA BONE DENSITY STUDY AXIAL   3. Disorder of bone and articular cartilage M89.9 733.90 DEXA BONE DENSITY STUDY AXIAL    M94.9         Suggest she add afrin nasal spray for up to 4 days    She should continue the augmentin for now (she has taken 3 days worth currently and is due to refill it for bladder work next week--botox)    Discussed her upcoming MRI--suggested she take 4 of her 0.5 mg clonazepam tablets before the procedure so she can be calm and relaxed. She has someone to drive her.     F/u here as appointed April 9

## 2020-01-28 ENCOUNTER — TELEPHONE (OUTPATIENT)
Dept: INTERNAL MEDICINE CLINIC | Age: 60
End: 2020-01-28

## 2020-01-28 RX ORDER — CEFPROZIL 500 MG/1
500 TABLET, FILM COATED ORAL 2 TIMES DAILY
Qty: 14 TAB | Refills: 0 | Status: SHIPPED | OUTPATIENT
Start: 2020-01-28 | End: 2020-02-04

## 2020-01-28 NOTE — TELEPHONE ENCOUNTER
Patient is calling in stating when she went to see the Neurologist she was told she had an inner ear infection but that they do not treat that, she would have to get in touch with her PCP. Can something be called in, or does she need an appt.   Please let patient know either way    [phone]

## 2020-01-29 NOTE — TELEPHONE ENCOUNTER
Spoke with patient and  2 patient identifiers confirmed. Advised patient of information below. Per Dr. Joey Iglesias verbal instruction patient is to stop taking the macrobid while taking the cefzil. Patient understood and had no further questions.

## 2020-01-31 LAB — HBA1C MFR BLD HPLC: 6.6 %

## 2020-05-01 DIAGNOSIS — G43.109 MIGRAINE AURA WITHOUT HEADACHE: ICD-10-CM

## 2020-05-01 RX ORDER — CEFUROXIME AXETIL 250 MG/1
TABLET ORAL
Qty: 1 KIT | Refills: 11 | Status: SHIPPED | OUTPATIENT
Start: 2020-05-01 | End: 2021-07-19

## 2020-05-04 LAB
CREATININE, EXTERNAL: 1
HBA1C MFR BLD HPLC: 6.4 %

## 2020-06-04 ENCOUNTER — OFFICE VISIT (OUTPATIENT)
Dept: INTERNAL MEDICINE CLINIC | Age: 60
End: 2020-06-04

## 2020-06-04 VITALS
SYSTOLIC BLOOD PRESSURE: 112 MMHG | BODY MASS INDEX: 41.53 KG/M2 | WEIGHT: 264.6 LBS | DIASTOLIC BLOOD PRESSURE: 66 MMHG | RESPIRATION RATE: 20 BRPM | HEIGHT: 67 IN | OXYGEN SATURATION: 99 % | HEART RATE: 93 BPM | TEMPERATURE: 95.9 F

## 2020-06-04 DIAGNOSIS — R25.2 LEG CRAMPS: ICD-10-CM

## 2020-06-04 DIAGNOSIS — Z00.00 MEDICARE ANNUAL WELLNESS VISIT, SUBSEQUENT: Primary | ICD-10-CM

## 2020-06-04 DIAGNOSIS — G89.29 CHRONIC KNEE PAIN, UNSPECIFIED LATERALITY: ICD-10-CM

## 2020-06-04 DIAGNOSIS — M25.569 CHRONIC KNEE PAIN, UNSPECIFIED LATERALITY: ICD-10-CM

## 2020-06-04 DIAGNOSIS — I10 ESSENTIAL HYPERTENSION: ICD-10-CM

## 2020-06-04 DIAGNOSIS — M76.62 ACHILLES TENDINITIS OF LEFT LOWER EXTREMITY: ICD-10-CM

## 2020-06-04 DIAGNOSIS — M79.642 LEFT HAND PAIN: ICD-10-CM

## 2020-06-04 DIAGNOSIS — E11.21 TYPE 2 DIABETES MELLITUS WITH NEPHROPATHY (HCC): ICD-10-CM

## 2020-06-04 RX ORDER — TIZANIDINE 2 MG/1
2 TABLET ORAL
Qty: 30 TAB | Refills: 2 | Status: SHIPPED | OUTPATIENT
Start: 2020-06-04 | End: 2020-12-08 | Stop reason: SDUPTHER

## 2020-06-04 NOTE — PROGRESS NOTES
The following is a separate encounter visit:    HISTORY OF PRESENT ILLNESS  Elsa Ritter is a 63 y.o. female. Visit Vitals  /66 (BP 1 Location: Left arm, BP Patient Position: Sitting)   Pulse 93   Temp (!) 95.9 °F (35.5 °C) (Oral)   Resp 20   Ht 5' 7\" (1.702 m)   Wt 264 lb 9.6 oz (120 kg)   SpO2 99%   BMI 41.44 kg/m²             Since last visit she saw Dr. Gregorio Medicus, cardiologist,  Her echo test showed nml ejection fraction, mild hypertrophy    She also had her PAP smear on 2/5/2020    She saw [facility] endocrinologist on 5/4/20. HBA1c 6.4.. Left knee giving her some pain      Pt has diabetes, HTN        Current Outpatient Medications:  tiZANidine (ZANAFLEX) 2 mg tablet, Take 1 Tab by mouth nightly as needed for Muscle Spasm(s). SUMAtriptan succinate 6 mg/0.5 mL kit, inject 1 dose (6 MG) subcutaneously for migraines  TRULICITY 1.5 US/6.9 mL sub-q pen,   omeprazole (PRILOSEC) 20 mg capsule,   cholecalciferol (VITAMIN D3) 25 mcg (1,000 unit) cap, Vitamin D3 25 mcg (1,000 unit) capsule   Take by oral route. DULoxetine (CYMBALTA) 30 mg capsule,   nitrofurantoin, macrocrystal-monohydrate, (MACROBID) 100 mg capsule, Take 1 Cap by mouth nightly. enalapril (VASOTEC) 20 mg tablet, take 1 tablet by mouth once daily  esomeprazole (NEXIUM) 40 mg capsule, Take 1 Cap by mouth daily. camphor-menthol (SARNA ORIGINAL) 0.5-0.5 % lotion, Apply  to affected area as needed for Itching. diclofenac (VOLTAREN) 1 % gel, Apply 2 g to affected area four (4) times daily. lubiPROStone (AMITIZA) 8 mcg capsule, Take one capsule daily with supper  lubiPROStone (AMITIZA) 24 mcg capsule, Take 1 Cap by mouth daily (with breakfast). NYSTOP powder, APPLY A THIN LAYER UNDER BREASTS TWICE A DAY AS NEEDED FOR FLARE  estradiol (ESTRACE) 0.01 % (0.1 mg/gram) vaginal cream, INSERT 2 GRAM INTO VAGINA DAILY.  APPLY FINGERTIP AMOUNT TO OUTSIDE OF VAGINAL OPENING  triamcinolone acetonide (KENALOG) 0.025 % topical cream, Apply  to affected area two (2) times a day. use thin layer  acetaminophen (TYLENOL EXTRA STRENGTH) 500 mg tablet, Take 1 Tab by mouth every six (6) hours as needed for Pain. cyanocobalamin 1,000 mcg tablet, Take 1 Tab by Mouth Once a Day. Insulin Needles, Disposable, (BD ULTRA-FINE MINI PEN NEEDLE) 31 gauge x 3/16\" ndle, BD Ultra-Fine Mini Pen Needle 31 gauge x 3/16\"  lancets (ONETOUCH DELICA LANCETS) 30 gauge misc, OneTouch Delica Lancets 30 gauge  EVELIA PEN NEEDLE 32 gauge x 5/32\" ndle, inject once daily  carvedilol (COREG) 12.5 mg tablet, take 1 tablet by mouth twice a day  DULoxetine (CYMBALTA) 60 mg capsule, 150 mg daily. hydrOXYzine (ATARAX) 50 mg tablet, take 1 tablet by mouth every 12 hours  multivitamin (ONE A DAY) tablet, Take 1 Tab by mouth daily. spironolactone (ALDACTONE) 25 mg tablet, Take 25 mg by mouth daily. coenzyme q10-vitamin e (COQ10 ) 100-100 mg-unit cap, Take  by mouth two (2) times a day. clonazepam (KLONOPIN) 0.5 mg tablet, Take  by mouth two (2) times a day. rosuvastatin (CRESTOR) 40 mg tablet, Take 40 mg by mouth daily. NON FORMULARY   fenofibrate micronized (LOFIBRA) 134 mg capsule, Take  by mouth every morning. aspirin 81 mg chewable tablet, Take 81 mg by mouth daily. CALCIUM CARBONATE/VITAMIN D3 (CALCIUM 600 + D,3, PO), Take 1 Cap by mouth daily. Knee Pain   The history is provided by the patient. The current episode started more than 1 week ago. The problem occurs daily. The problem has been gradually worsening. Pertinent negatives include no chest pain and no shortness of breath. The symptoms are aggravated by bending, walking and standing. The symptoms are relieved by medications. She has tried acetaminophen for the symptoms. The treatment provided mild relief. Hypertension    The history is provided by the patient. This is a chronic problem. The current episode started more than 1 week ago. The problem has not changed since onset. Pertinent negatives include no chest pain, no palpitations, no blurred vision and no shortness of breath. There are no associated agents to hypertension. Risk factors include obesity, postmenopause, a sedentary lifestyle, diabetes mellitus and dyslipidemia. Diabetes   The history is provided by the patient. This is a chronic problem. The current episode started more than 1 week ago. The problem occurs daily. The problem has not changed since onset. Pertinent negatives include no chest pain and no shortness of breath. Exacerbated by: diet. The symptoms are relieved by medications (diet). Treatments tried: goes to diabetes institute at [facility]. The treatment provided significant relief. Review of Systems   Constitutional: Negative. Eyes: Negative for blurred vision. Respiratory: Negative for shortness of breath. Cardiovascular: Negative for chest pain and palpitations. Genitourinary: Negative for frequency and urgency. Musculoskeletal: Positive for joint pain. Left hand pain from a fall a month ago   Endo/Heme/Allergies: Negative for polydipsia. Physical Exam  Vitals signs and nursing note reviewed. Constitutional:       Appearance: She is well-developed. Cardiovascular:      Rate and Rhythm: Normal rate and regular rhythm. Pulmonary:      Effort: Pulmonary effort is normal.      Breath sounds: Normal breath sounds. Musculoskeletal:      Comments: Left achilles tendon is tender at the insertion    Left hand mild tenderness over 4th metacarpal mid-shaft   Skin:     General: Skin is warm and dry. Neurological:      General: No focal deficit present. Mental Status: She is alert and oriented to person, place, and time.       Comments: Diabetic foot exam:     Left Foot:   Visual Exam: hammer toes 2 and 3   Pulse DP: 2+ (normal)   Filament test: normal sensation    Vibratory sensation: diminished      Right Foot:   Visual Exam: bunion, hammer toes 2 and 3   Pulse DP: 2+ (normal)   Filament test: normal sensation    Vibratory sensation: diminished     Psychiatric:         Mood and Affect: Mood normal.         ASSESSMENT and PLAN    ICD-10-CM ICD-9-CM           2. Type 2 diabetes mellitus with nephropathy (HCC) E11.21 250.40  DIABETES FOOT EXAM     583.81    3. Essential hypertension I10 401.9    4. Chronic knee pain, unspecified laterality M25.569 719.46     G89.29 338.29    5. Leg cramps R25.2 729.82 tiZANidine (ZANAFLEX) 2 mg tablet   6. Achilles tendinitis of left lower extremity M76.62 726.71    7. Left hand pain M79.642 729.5        Reviewed outside records provided by patient  DM controlled    BP controlled    Left hand pain. Conchita Wright a month ago. She will f/u with ortho    Achilles tendinitis. She will f/u with ortho    Add tizanidine for leg cramps at night    F/u 6 months for HTN, DM, chol            This is the Subsequent Medicare Annual Wellness Exam, performed 12 months or more after the Initial AWV or the last Subsequent AWV    I have reviewed the patient's medical history in detail and updated the computerized patient record. History     Patient Active Problem List   Diagnosis Code    Migraine aura without headache G43. 109    Fatigue R53.83    Urge incontinence N39.41    OAB (overactive bladder) N32.81    Disorder of urinary tract N39.9    Constipation K59.00    Urinary incontinence R32    Dysfunctional voiding of urine N39.8    Hematuria R31.9    Type 2 diabetes mellitus without complication, without long-term current use of insulin (HCC) E11.9    Dyslipidemia E78.5    Essential hypertension I10    Type 2 diabetes mellitus with nephropathy (HCC) E11.21    Severe obesity (BMI 35.0-39. 9) with comorbidity ([address] E66.01    HTN (hypertension) I10    Diabetes ([facility] E11.9     Past Medical History:   Diagnosis Date    Abnormal bone density screening 3/10/2011    Anemia 1996    Arthritis     Baker's cyst     left    Cardiomyopathy (HCC)     Chicken pox     Constipation     Constipation due to pain medication     CTS (carpal tunnel syndrome)     bilat.  Depression 2000    Diabetes ([facility] [address]     Diabetes mellitus     type 2 diabetes mellitus without complication, without long-term current use of insulin. type 2 diabetes mellitus with nephropathy     Disorder of urinary tract     Dysfunctional voiding of urine     Dyslipidemia     Essential hypertension     Fatigue     multifactorial    Fibromyalgia     GERD (gastroesophageal reflux disease)     H/O bone density study 2017    osteopenia, SEE MEDIA    Hematuria     History of meniscal tear     HTN (hypertension)     Incontinence     Metacarpal bone fracture     left 5th    Migraine     aura without headache    Nephropathy     due to diabetes?  OAB (overactive bladder)     documented on UDS     Obesity     Osteopenia 2006    Plantar fasciitis     Pre-syncope 16    Primary osteoarthritis of left knee     Renal insufficiency     PER DR. JARAD WARREN    Restless leg     Severe obesity (BMI 35.0-39. 9) with comorbidity ([address]     Sleep apnea     C-PAP    Type II or unspecified type diabetes mellitus with neurological manifestations, uncontrolled(250.62) (HCC)     Unspecified hereditary and idiopathic peripheral neuropathy     Urge incontinence     Urinary incontinence     unspec.      Urinary tract infection, site not specified     Vitamin D deficiency       Past Surgical History:   Procedure Laterality Date    CARDIAC CATHETERIZATION  2006    EGD  2008    HX ARTHROTOMY Left 2015    upper arm     HX CARPAL TUNNEL RELEASE Left 2016    wrist     HX  SECTION      x2    HX CHOLECYSTECTOMY      HX COLONOSCOPY  2014    HX ENDOSCOPY      ENDOSCOPY,COLON, DIAGNOSTIC    HX KNEE ARTHROSCOPY Left 2018    HX KNEE REPLACEMENT Left 2018    HX OTHER SURGICAL Left 2015    SHOULDER REPAIR     Current Outpatient Medications   Medication Sig Dispense Refill    tiZANidine (ZANAFLEX) 2 mg tablet Take 1 Tab by mouth nightly as needed for Muscle Spasm(s). 30 Tab 2    SUMAtriptan succinate 6 mg/0.5 mL kit inject 1 dose (6 MG) subcutaneously for migraines 1 Kit 11    TRULICITY 1.5 UD/0.8 mL sub-q pen       omeprazole (PRILOSEC) 20 mg capsule       cholecalciferol (VITAMIN D3) 25 mcg (1,000 unit) cap Vitamin D3 25 mcg (1,000 unit) capsule   Take by oral route.  DULoxetine (CYMBALTA) 30 mg capsule       nitrofurantoin, macrocrystal-monohydrate, (MACROBID) 100 mg capsule Take 1 Cap by mouth nightly. 90 Cap 3    enalapril (VASOTEC) 20 mg tablet take 1 tablet by mouth once daily 30 Tab 5    esomeprazole (NEXIUM) 40 mg capsule Take 1 Cap by mouth daily. 90 Cap 1    camphor-menthol (SARNA ORIGINAL) 0.5-0.5 % lotion Apply  to affected area as needed for Itching.  diclofenac (VOLTAREN) 1 % gel Apply 2 g to affected area four (4) times daily. 100 g 5    lubiPROStone (AMITIZA) 8 mcg capsule Take one capsule daily with supper 90 Cap 4    lubiPROStone (AMITIZA) 24 mcg capsule Take 1 Cap by mouth daily (with breakfast). 90 Cap 4    NYSTOP powder APPLY A THIN LAYER UNDER BREASTS TWICE A DAY AS NEEDED FOR FLARE 60 g 11    estradiol (ESTRACE) 0.01 % (0.1 mg/gram) vaginal cream INSERT 2 GRAM INTO VAGINA DAILY. APPLY FINGERTIP AMOUNT TO OUTSIDE OF VAGINAL OPENING 42.5 g 11    triamcinolone acetonide (KENALOG) 0.025 % topical cream Apply  to affected area two (2) times a day. use thin layer 15 g 5    acetaminophen (TYLENOL EXTRA STRENGTH) 500 mg tablet Take 1 Tab by mouth every six (6) hours as needed for Pain. 30 Tab 0    cyanocobalamin 1,000 mcg tablet Take 1 Tab by Mouth Once a Day.       Insulin Needles, Disposable, (BD ULTRA-FINE MINI PEN NEEDLE) 31 gauge x 3/16\" ndle BD Ultra-Fine Mini Pen Needle 31 gauge x 3/16\"      lancets (ONETOUCH DELICA LANCETS) 30 gauge misc OneTouch Delica Lancets 30 gauge      EVELIA PEN NEEDLE 32 gauge x 5/32\" ndle inject once daily  0    carvedilol (COREG) 12.5 mg tablet take 1 tablet by mouth twice a day  0    DULoxetine (CYMBALTA) 60 mg capsule 150 mg daily. 0    hydrOXYzine (ATARAX) 50 mg tablet take 1 tablet by mouth every 12 hours  0    multivitamin (ONE A DAY) tablet Take 1 Tab by mouth daily.  spironolactone (ALDACTONE) 25 mg tablet Take 25 mg by mouth daily.  coenzyme q10-vitamin e (COQ10 ) 100-100 mg-unit cap Take  by mouth two (2) times a day.  clonazepam (KLONOPIN) 0.5 mg tablet Take  by mouth two (2) times a day.  rosuvastatin (CRESTOR) 40 mg tablet Take 40 mg by mouth daily. NON FORMULARY       fenofibrate micronized (LOFIBRA) 134 mg capsule Take  by mouth every morning.  aspirin 81 mg chewable tablet Take 81 mg by mouth daily.  CALCIUM CARBONATE/VITAMIN D3 (CALCIUM 600 + D,3, PO) Take 1 Cap by mouth daily. Allergies   Allergen Reactions    Digoxin Nausea Only    Niaspan [Niacin] Rash    Wellbutrin [Bupropion Hcl] Itching       Family History   Problem Relation Age of Onset    Hypertension Mother     Heart Disease Mother     Kidney Disease Mother     Heart Disease Father     Emphysema Father      Social History     Tobacco Use    Smoking status: Never Smoker    Smokeless tobacco: Never Used   Substance Use Topics    Alcohol use: No       Depression Risk Factor Screening:     3 most recent PHQ Screens 6/4/2020   PHQ Not Done -   Little interest or pleasure in doing things More than half the days   Feeling down, depressed, irritable, or hopeless Nearly every day   Total Score PHQ 2 5       Alcohol Risk Factor Screening:   Do you average 1 drink per night or more than 7 drinks a week:  No    On any one occasion in the past three months have you have had more than 3 drinks containing alcohol:  No      Functional Ability and Level of Safety:   Hearing: Hearing is good. Activities of Daily Living: The home contains: no safety equipment.   Patient does total self care    Ambulation: with mild difficulty    Fall Risk:  No flowsheet data found. Abuse Screen:  Patient is not abused    Cognitive Screening   Has your family/caregiver stated any concerns about your memory: no  Cognitive Screening: Normal - Animal Naming Test    Patient Care Team   Patient Care Team:  Ilene Meckel, MD as PCP - General (Internal Medicine)  Ilene Meckel, MD as PCP - [address] [place] [address] [facility]  Tor Long MD as Consulting Provider (Endocrinology)  Salome Sidhu MD as Consulting Provider (Obstetrics & Gynecology)  Dusty Ware MD as Consulting Provider (Orthopedic Surgery)  Nicole Sewell MD as Consulting Provider (Urology)  Lennox Martinez MD as Physician (Optometry)  Justice Perales MD as Consulting Provider (Orthopedic Surgery)  Dale Thrasher MD as Physician (Gastroenterology)  Urmila Dietrich MD as Consulting Provider ([address] [place] [address] Vascular Surgery)  Julissa Funk MD (Audiology)    Assessment/Plan   Education and counseling provided:  Are appropriate based on today's review and evaluation    Diagnoses and all orders for this visit:    1. Medicare annual wellness visit, subsequent    2. Type 2 diabetes mellitus with nephropathy (HCC)  -      DIABETES FOOT EXAM    3. Essential hypertension    4. Chronic knee pain, unspecified laterality    5. Leg cramps  -     tiZANidine (ZANAFLEX) 2 mg tablet; Take 1 Tab by mouth nightly as needed for Muscle Spasm(s). 6. Achilles tendinitis of left lower extremity    7.  Left hand pain        Health Maintenance Due   Topic Date Due    Foot Exam Q1  12/28/2018    MICROALBUMIN Q1  01/02/2020

## 2020-06-04 NOTE — PROGRESS NOTES
Babette Sever is a 63 y.o. female (: 1960) presenting to address:    Chief Complaint   Patient presents with    Follow-up     patient here today for a follow up and would like to discuss bump on her head and LT heal soreness    Knee Pain     pain scale 8/10    Hypertension    Cholesterol Problem    Diabetes       Vitals:    20 1402   BP: 112/66   Pulse: 93   Resp: 20   Temp: (!) 95.9 °F (35.5 °C)   TempSrc: Oral   SpO2: 99%   Weight: 264 lb 9.6 oz (120 kg)   Height: 5' 7\" (1.702 m)   PainSc:   8   PainLoc: Knee       Hearing/Vision:   No exam data present    Learning Assessment:     Learning Assessment 2014   PRIMARY LEARNER Patient   HIGHEST LEVEL OF EDUCATION - PRIMARY LEARNER  2 YEARS OF COLLEGE   BARRIERS PRIMARY LEARNER NONE   CO-LEARNER CAREGIVER No   PRIMARY LANGUAGE ENGLISH   LEARNER PREFERENCE PRIMARY DEMONSTRATION   ANSWERED BY self   RELATIONSHIP SELF     Depression Screening:     3 most recent PHQ Screens 2020   PHQ Not Done -   Little interest or pleasure in doing things More than half the days   Feeling down, depressed, irritable, or hopeless Nearly every day   Total Score PHQ 2 5     Fall Risk Assessment:   No flowsheet data found. Abuse Screening:   No flowsheet data found. Coordination of Care Questionaire:   1. Have you been to the ER, urgent care clinic since your last visit? Hospitalized since your last visit? NO    2. Have you seen or consulted any other health care providers outside of the [address] [place] [address] since your last visit? Include any pap smears or colon screening. YES Dr. Olivier Sanderson    Advanced Directive:   1. Do you have an Advanced Directive? NO    2. Would you like information on Advanced Directives?  NO

## 2020-06-04 NOTE — PATIENT INSTRUCTIONS
Medicare Wellness Visit, Female     The best way to live healthy is to have a lifestyle where you eat a well-balanced diet, exercise regularly, limit alcohol use, and quit all forms of tobacco/nicotine, if applicable. Regular preventive services are another way to keep healthy. Preventive services (vaccines, screening tests, monitoring & exams) can help personalize your care plan, which helps you manage your own care. Screening tests can find health problems at the earliest stages, when they are easiest to treat. [facility] follows the current, evidence-based guidelines published by the [facility] [facility] ([facility]) when recommending preventive services for our patients. Because we follow these guidelines, sometimes recommendations change over time as research supports it. (For example, mammograms used to be recommended annually. Even though Medicare will still pay for an annual mammogram, the newer guidelines recommend a mammogram every two years for women of average risk). Of course, you and your doctor may decide to screen more often for some diseases, based on your risk and your co-morbidities (chronic disease you are already diagnosed with). Preventive services for you include:  - Medicare offers their members a free annual wellness visit, which is time for you and your primary care provider to discuss and plan for your preventive service needs. Take advantage of this benefit every year!  -All adults over the age of 72 should receive the recommended pneumonia vaccines. Current USPSTF guidelines recommend a series of two vaccines for the best pneumonia protection.   -All adults should have a flu vaccine yearly and a tetanus vaccine every 10 years.   -All adults age 48 and older should receive the shingles vaccines (series of two vaccines).       -All adults age 38-68 who are overweight should have a diabetes screening test once every three years.   -All adults born between [birth year] and 1964 should be screened once for Hepatitis C.  -Other screening tests and preventive services for persons with diabetes include: an eye exam to screen for diabetic retinopathy, a kidney function test, a foot exam, and stricter control over your cholesterol.   -Cardiovascular screening for adults with routine risk involves an electrocardiogram (ECG) at intervals determined by your doctor.   -Colorectal cancer screenings should be done for adults age 54-65 with no increased risk factors for colorectal cancer. There are a number of acceptable methods of screening for this type of cancer. Each test has its own benefits and drawbacks. Discuss with your doctor what is most appropriate for you during your annual wellness visit. The different tests include: colonoscopy (considered the best screening method), a fecal occult blood test, a fecal DNA test, and sigmoidoscopy.    -A bone mass density test is recommended when a woman turns 65 to screen for osteoporosis. This test is only recommended one time, as a screening. Some providers will use this same test as a disease monitoring tool if you already have osteoporosis. -Breast cancer screenings are recommended every other year for women of normal risk, age 54-69.  -Cervical cancer screenings for women over age 72 are only recommended with certain risk factors.      Here is a list of your current Health Maintenance items (your personalized list of preventive services) with a due date:  Health Maintenance Due   Topic Date Due    Diabetic Foot Care  12/28/2018    Albumin Urine Test  01/02/2020    Annual Well Visit  04/04/2020    Pap Test  08/30/2020

## 2020-07-01 DIAGNOSIS — Z76.0 MEDICATION REFILL: ICD-10-CM

## 2020-07-01 RX ORDER — ENALAPRIL MALEATE 20 MG/1
TABLET ORAL
Qty: 30 TAB | Refills: 5 | Status: SHIPPED | OUTPATIENT
Start: 2020-07-01 | End: 2021-01-04

## 2020-07-16 ENCOUNTER — TELEPHONE (OUTPATIENT)
Dept: INTERNAL MEDICINE CLINIC | Age: 60
End: 2020-07-16

## 2020-07-16 RX ORDER — AMOXICILLIN AND CLAVULANATE POTASSIUM 875; 125 MG/1; MG/1
1 TABLET, FILM COATED ORAL 2 TIMES DAILY
Qty: 20 TAB | Refills: 1 | Status: SHIPPED | OUTPATIENT
Start: 2020-07-16 | End: 2020-07-29

## 2020-07-17 NOTE — TELEPHONE ENCOUNTER
Patient is returning a call to the office. Unable to reach a clinical staff member. Patient informed and antibiotic has been sent in to her pharmacy.

## 2020-07-22 RX ORDER — NYSTATIN 100000 [USP'U]/G
POWDER TOPICAL
Qty: 60 G | Refills: 11 | Status: SHIPPED | OUTPATIENT
Start: 2020-07-22

## 2020-09-04 DIAGNOSIS — K59.04 CHRONIC IDIOPATHIC CONSTIPATION: ICD-10-CM

## 2020-09-04 RX ORDER — LUBIPROSTONE 8 UG/1
CAPSULE, GELATIN COATED ORAL
Qty: 90 CAP | Refills: 4 | Status: SHIPPED | OUTPATIENT
Start: 2020-09-04 | End: 2021-01-19 | Stop reason: CLARIF

## 2020-09-06 RX ORDER — OMEPRAZOLE 20 MG/1
CAPSULE, DELAYED RELEASE ORAL
Qty: 180 CAP | Refills: 3 | Status: SHIPPED | OUTPATIENT
Start: 2020-09-06 | End: 2021-11-08

## 2020-10-13 ENCOUNTER — HOSPITAL ENCOUNTER (OUTPATIENT)
Dept: LAB | Age: 60
Discharge: HOME | End: 2020-10-13
Payer: MEDICARE

## 2020-10-13 ENCOUNTER — OFFICE VISIT (OUTPATIENT)
Dept: INTERNAL MEDICINE CLINIC | Age: 60
End: 2020-10-13
Payer: MEDICARE

## 2020-10-13 VITALS
WEIGHT: 268 LBS | OXYGEN SATURATION: 96 % | SYSTOLIC BLOOD PRESSURE: 113 MMHG | DIASTOLIC BLOOD PRESSURE: 75 MMHG | BODY MASS INDEX: 42.06 KG/M2 | TEMPERATURE: 97.9 F | RESPIRATION RATE: 23 BRPM | HEIGHT: 67 IN | HEART RATE: 89 BPM

## 2020-10-13 DIAGNOSIS — M54.42 BILATERAL LOW BACK PAIN WITH BILATERAL SCIATICA, UNSPECIFIED CHRONICITY: Primary | ICD-10-CM

## 2020-10-13 DIAGNOSIS — M54.41 BILATERAL LOW BACK PAIN WITH BILATERAL SCIATICA, UNSPECIFIED CHRONICITY: Primary | ICD-10-CM

## 2020-10-13 DIAGNOSIS — M79.10 MUSCLE PAIN: ICD-10-CM

## 2020-10-13 DIAGNOSIS — K59.04 CHRONIC IDIOPATHIC CONSTIPATION: ICD-10-CM

## 2020-10-13 DIAGNOSIS — Z23 NEEDS FLU SHOT: ICD-10-CM

## 2020-10-13 LAB — CK SERPL-CCNC: 219 U/L (ref 26–192)

## 2020-10-13 PROCEDURE — 82550 ASSAY OF CK (CPK): CPT

## 2020-10-13 PROCEDURE — 90686 IIV4 VACC NO PRSV 0.5 ML IM: CPT | Performed by: INTERNAL MEDICINE

## 2020-10-13 PROCEDURE — G0008 ADMIN INFLUENZA VIRUS VAC: HCPCS | Performed by: INTERNAL MEDICINE

## 2020-10-13 PROCEDURE — G9899 SCRN MAM PERF RSLTS DOC: HCPCS | Performed by: INTERNAL MEDICINE

## 2020-10-13 PROCEDURE — G8754 DIAS BP LESS 90: HCPCS | Performed by: INTERNAL MEDICINE

## 2020-10-13 PROCEDURE — G8427 DOCREV CUR MEDS BY ELIG CLIN: HCPCS | Performed by: INTERNAL MEDICINE

## 2020-10-13 PROCEDURE — G8417 CALC BMI ABV UP PARAM F/U: HCPCS | Performed by: INTERNAL MEDICINE

## 2020-10-13 PROCEDURE — G8752 SYS BP LESS 140: HCPCS | Performed by: INTERNAL MEDICINE

## 2020-10-13 PROCEDURE — 3017F COLORECTAL CA SCREEN DOC REV: CPT | Performed by: INTERNAL MEDICINE

## 2020-10-13 PROCEDURE — 99214 OFFICE O/P EST MOD 30 MIN: CPT | Performed by: INTERNAL MEDICINE

## 2020-10-13 PROCEDURE — 36415 COLL VENOUS BLD VENIPUNCTURE: CPT

## 2020-10-13 PROCEDURE — 82085 ASSAY OF ALDOLASE: CPT

## 2020-10-13 PROCEDURE — G8432 DEP SCR NOT DOC, RNG: HCPCS | Performed by: INTERNAL MEDICINE

## 2020-10-13 RX ORDER — LUBIPROSTONE 24 UG/1
24 CAPSULE, GELATIN COATED ORAL
Qty: 90 CAP | Refills: 4 | Status: SHIPPED | OUTPATIENT
Start: 2020-10-13 | End: 2020-10-13

## 2020-10-13 RX ORDER — LUBIPROSTONE 24 UG/1
CAPSULE, GELATIN COATED ORAL
Qty: 90 CAP | Refills: 4 | Status: SHIPPED | OUTPATIENT
Start: 2020-10-13 | End: 2021-01-19 | Stop reason: CLARIF

## 2020-10-13 NOTE — PROGRESS NOTES
HISTORY OF PRESENT ILLNESS  Dorothea Galeazzi is a 62 y.o. female. Visit Vitals  /75 (BP 1 Location: Left arm, BP Patient Position: Sitting)   Pulse 89   Temp 97.9 °F (36.6 °C) (Oral)   Resp 23   Ht 5' 7\" (1.702 m)   Wt 268 lb (121.6 kg)   SpO2 96%   BMI 41.97 kg/m²       Both legs ache, susu when lying down. Goes all the way down to her feet. Her low back hurts as well  It is painful to walk    Had has a left TKR and reports that leg still feels weak    Leg Pain    The history is provided by the patient. This is a recurrent problem. The current episode started more than 1 week ago. The problem occurs daily. The problem has not changed since onset. Associated symptoms include back pain. Review of Systems   Constitutional: Negative for chills and fever. Musculoskeletal: Positive for back pain, joint pain and myalgias. Neurological: Positive for sensory change and weakness. Physical Exam  Vitals signs and nursing note reviewed. Constitutional:       Appearance: Normal appearance. She is well-developed. Cardiovascular:      Rate and Rhythm: Normal rate and regular rhythm. Pulmonary:      Effort: Pulmonary effort is normal.      Breath sounds: Normal breath sounds. Musculoskeletal:        Arms:       Comments: Atalgic gait  Unable to deep knee bend  No swelling of the lower extremities. Good color. Absent achilles reflexes       Skin:     General: Skin is warm and dry. Neurological:      General: No focal deficit present. Mental Status: She is alert and oriented to person, place, and time. Psychiatric:         Mood and Affect: Mood normal.         Behavior: Behavior normal.         ASSESSMENT and PLAN    ICD-10-CM ICD-9-CM    1. Bilateral low back pain with bilateral sciatica, unspecified chronicity  M54.42 724.3 MRI LUMB SPINE WO CONT    M54.41 724.2    2. Chronic idiopathic constipation  K59.04 564.00 lubiPROStone (AMITIZA) 24 mcg capsule   3.  Muscle pain  M79.10 729.1 ALDOLASE      MRI LUMB SPINE WO CONT      CK   4.  Needs flu shot  Z23 V04.81 INFLUENZA VIRUS VAC QUAD,SPLIT,PRESV FREE SYRINGE IM       She had recent xrays that showed moderate arthritis in her back susu L5-S1  Needs MRI    Hold crestor to see if this is the cause of the elevated CPK--detected at [facility] also when she was seen for back pain    May need referral to [address] [place] [address] who has sent her in the past and had sent her out for back injection    Discussed weight and her effect on her joints    F/u here 2 weeks for recheck

## 2020-10-13 NOTE — PROGRESS NOTES
Influenza immunization administered left deltoid  10/13/2020 by Thor Phillips LPN with pt's consent. Patient tolerated procedure well. No reactions noted.

## 2020-10-13 NOTE — PROGRESS NOTES
ROOM # 4    Corinne Clause presents today for No chief complaint on file. Corinne Clause preferred language for health care discussion is english/other. Is someone accompanying this pt? no    Is the patient using any DME equipment during OV? no    Depression Screening:  3 most recent PHQ Screens 6/4/2020 10/18/2018 10/8/2018 9/25/2018 12/14/2017 7/18/2017 3/16/2017   PHQ Not Done - - - - - Active Diagnosis of Depression or Bipolar Disorder Active Diagnosis of Depression or Bipolar Disorder   Little interest or pleasure in doing things More than half the days Not at all Not at all Not at all Not at all Not at all -   Feeling down, depressed, irritable, or hopeless Nearly every day Not at all Not at all Several days Not at all Not at all -   Total Score PHQ 2 5 0 0 1 0 0 -       Learning Assessment:  Learning Assessment 11/24/2014   PRIMARY LEARNER Patient   HIGHEST LEVEL OF EDUCATION - PRIMARY LEARNER  2 YEARS [facility] PRIMARY LEARNER NONE   CO-LEARNER CAREGIVER No   PRIMARY LANGUAGE ENGLISH   LEARNER PREFERENCE PRIMARY DEMONSTRATION   ANSWERED BY self   RELATIONSHIP SELF       Abuse Screening:  No flowsheet data found. Fall Risk  No flowsheet data found. Health Maintenance reviewed and discussed per provider. Yes    Corinne Clause is due for   Health Maintenance Due   Topic Date Due    MICROALBUMIN Q1  01/02/2020    Flu Vaccine (1) 09/01/2020     Please order/place referral if appropriate. Advance Directive:  1. Do you have an advance directive in place? Patient Reply: no    2. If not, would you like material regarding how to put one in place? Patient Reply: no    Coordination of Care:  1. Have you been to the ER, urgent care clinic since your last visit? Hospitalized since your last visit? yes    2. Have you seen or consulted any other health care providers outside of the [address] [place] [address] since your last visit?  Include any pap smears or colon screening.  Neurologist

## 2020-10-15 LAB — ALDOLASE SERPL-CCNC: 5.5 U/L (ref 3.3–10.3)

## 2020-10-26 ENCOUNTER — TELEPHONE (OUTPATIENT)
Dept: INTERNAL MEDICINE CLINIC | Age: 60
End: 2020-10-26

## 2020-10-26 NOTE — TELEPHONE ENCOUNTER
Patient is calling to F/U on the MRI that was order a few weeks a go. States she has not heard form anyone to get this scheduled. Order was supposed to be sent to [facility]. Can this please be followed up on and call the patient to let her know what is happening. Also, patient has an appt this week to F/U on the MRI, should she RS?

## 2020-10-26 NOTE — TELEPHONE ENCOUNTER
Informed patient to check with [facility] for update on appt. Gave patient the number to out patient  appts desk.

## 2020-10-27 ENCOUNTER — TELEPHONE (OUTPATIENT)
Dept: INTERNAL MEDICINE CLINIC | Age: 60
End: 2020-10-27

## 2020-10-27 DIAGNOSIS — M54.50 CHRONIC LOW BACK PAIN, UNSPECIFIED BACK PAIN LATERALITY, UNSPECIFIED WHETHER SCIATICA PRESENT: Primary | ICD-10-CM

## 2020-10-27 DIAGNOSIS — G89.29 CHRONIC LOW BACK PAIN, UNSPECIFIED BACK PAIN LATERALITY, UNSPECIFIED WHETHER SCIATICA PRESENT: Primary | ICD-10-CM

## 2020-10-27 RX ORDER — LIDOCAINE 50 MG/G
PATCH TOPICAL
Qty: 30 EACH | Refills: 5 | Status: SHIPPED | OUTPATIENT
Start: 2020-10-27 | End: 2022-10-28 | Stop reason: SDUPTHER

## 2020-10-27 NOTE — TELEPHONE ENCOUNTER
Patient is calling in requesting Lidocaine patches or some type of pain patch to help with her back pain.

## 2020-11-10 LAB — HBA1C MFR BLD HPLC: 6.2 %

## 2020-11-18 ENCOUNTER — DOCUMENTATION ONLY (OUTPATIENT)
Dept: INTERNAL MEDICINE CLINIC | Age: 60
End: 2020-11-18

## 2020-11-18 ENCOUNTER — OFFICE VISIT (OUTPATIENT)
Dept: INTERNAL MEDICINE CLINIC | Age: 60
End: 2020-11-18
Payer: MEDICARE

## 2020-11-18 VITALS
OXYGEN SATURATION: 100 % | HEIGHT: 67 IN | DIASTOLIC BLOOD PRESSURE: 77 MMHG | RESPIRATION RATE: 22 BRPM | SYSTOLIC BLOOD PRESSURE: 114 MMHG | HEART RATE: 105 BPM | BODY MASS INDEX: 41.91 KG/M2 | TEMPERATURE: 97.4 F | WEIGHT: 267 LBS

## 2020-11-18 DIAGNOSIS — M54.41 BILATERAL LOW BACK PAIN WITH BILATERAL SCIATICA, UNSPECIFIED CHRONICITY: ICD-10-CM

## 2020-11-18 DIAGNOSIS — M54.42 BILATERAL LOW BACK PAIN WITH BILATERAL SCIATICA, UNSPECIFIED CHRONICITY: ICD-10-CM

## 2020-11-18 DIAGNOSIS — I73.9 PERIPHERAL VASCULAR DISEASE (HCC): ICD-10-CM

## 2020-11-18 DIAGNOSIS — G89.29 CHRONIC LOW BACK PAIN, UNSPECIFIED BACK PAIN LATERALITY, UNSPECIFIED WHETHER SCIATICA PRESENT: Primary | ICD-10-CM

## 2020-11-18 DIAGNOSIS — M54.50 CHRONIC LOW BACK PAIN, UNSPECIFIED BACK PAIN LATERALITY, UNSPECIFIED WHETHER SCIATICA PRESENT: Primary | ICD-10-CM

## 2020-11-18 PROCEDURE — G9899 SCRN MAM PERF RSLTS DOC: HCPCS | Performed by: INTERNAL MEDICINE

## 2020-11-18 PROCEDURE — G8427 DOCREV CUR MEDS BY ELIG CLIN: HCPCS | Performed by: INTERNAL MEDICINE

## 2020-11-18 PROCEDURE — 99214 OFFICE O/P EST MOD 30 MIN: CPT | Performed by: INTERNAL MEDICINE

## 2020-11-18 PROCEDURE — G8754 DIAS BP LESS 90: HCPCS | Performed by: INTERNAL MEDICINE

## 2020-11-18 PROCEDURE — G8752 SYS BP LESS 140: HCPCS | Performed by: INTERNAL MEDICINE

## 2020-11-18 PROCEDURE — G8417 CALC BMI ABV UP PARAM F/U: HCPCS | Performed by: INTERNAL MEDICINE

## 2020-11-18 PROCEDURE — 3017F COLORECTAL CA SCREEN DOC REV: CPT | Performed by: INTERNAL MEDICINE

## 2020-11-18 PROCEDURE — G8432 DEP SCR NOT DOC, RNG: HCPCS | Performed by: INTERNAL MEDICINE

## 2020-11-18 RX ORDER — METHYLPREDNISOLONE 4 MG/1
TABLET ORAL
Qty: 1 DOSE PACK | Refills: 0 | Status: SHIPPED | OUTPATIENT
Start: 2020-11-18 | End: 2020-12-28 | Stop reason: ALTCHOICE

## 2020-11-18 NOTE — PROGRESS NOTES
Lidocaine 5% patches    Approvedtoday   CaseId:[id number];Status:Approved; Review Type:Prior Auth; Coverage Start Date:10/19/2020; Coverage End Date:11/18/2023

## 2020-11-18 NOTE — PROGRESS NOTES
HISTORY OF PRESENT ILLNESS  Irving Smith is a 62 y.o. female. Visit Vitals  /77 (BP 1 Location: Right arm, BP Patient Position: Sitting)   Pulse (!) 105   Temp 97.4 °F (36.3 °C) (Temporal)   Resp 22   Ht 5' 7\" (1.702 m)   Wt 267 lb (121.1 kg)   SpO2 100%   BMI 41.82 kg/m²       Pt is here to f/u on her back pain. She had an MRI at [facility]    We also sent an RX for lidoderm patches. Somehow this was not filled due to needing an authorization. Her bilateral leg pain persists  She has h/o back pain. H/o back injection    Is on muscle relaxers                  Current Outpatient Medications:  lidocaine (LIDODERM) 5 %, Apply patch to the affected area for 12 hours a day and remove for 12 hours a day. Amitiza 24 mcg capsule, take 1 capsule by mouth once daily (WITH BREAKFAST)  omeprazole (PRILOSEC) 20 mg capsule, take 1 capsule by mouth twice a day  lubiPROStone (Amitiza) 8 mcg capsule, take 1 capsule by mouth once daily WITH SUPPER  Nyamyc powder, APPLY A THIN LAYER UNDER BREASTS TWICE A DAY AS NEEDED FOR FLARE  enalapril (VASOTEC) 20 mg tablet, take 1 tablet by mouth once daily  tiZANidine (ZANAFLEX) 2 mg tablet, Take 1 Tab by mouth nightly as needed for Muscle Spasm(s). SUMAtriptan succinate 6 mg/0.5 mL kit, inject 1 dose (6 MG) subcutaneously for migraines  TRULICITY 1.5 IA/5.8 mL sub-q pen,   cholecalciferol (VITAMIN D3) 25 mcg (1,000 unit) cap, Vitamin D3 25 mcg (1,000 unit) capsule   Take by oral route. DULoxetine (CYMBALTA) 30 mg capsule,   nitrofurantoin, macrocrystal-monohydrate, (MACROBID) 100 mg capsule, Take 1 Cap by mouth nightly. camphor-menthol (SARNA ORIGINAL) 0.5-0.5 % lotion, Apply  to affected area as needed for Itching. diclofenac (VOLTAREN) 1 % gel, Apply 2 g to affected area four (4) times daily. estradiol (ESTRACE) 0.01 % (0.1 mg/gram) vaginal cream, INSERT 2 GRAM INTO VAGINA DAILY.  APPLY FINGERTIP AMOUNT TO OUTSIDE OF VAGINAL OPENING  triamcinolone acetonide (KENALOG) 0.025 % topical cream, Apply  to affected area two (2) times a day. use thin layer  acetaminophen (TYLENOL EXTRA STRENGTH) 500 mg tablet, Take 1 Tab by mouth every six (6) hours as needed for Pain. cyanocobalamin 1,000 mcg tablet, Take 1 Tab by Mouth Once a Day. Insulin Needles, Disposable, (BD ULTRA-FINE MINI PEN NEEDLE) 31 gauge x 3/16\" ndle, BD Ultra-Fine Mini Pen Needle 31 gauge x 3/16\"  lancets (ONETOUCH DELICA LANCETS) 30 gauge misc, OneTouch Delica Lancets 30 gauge  EVELIA PEN NEEDLE 32 gauge x 5/32\" ndle, inject once daily  carvedilol (COREG) 12.5 mg tablet, take 1 tablet by mouth twice a day  DULoxetine (CYMBALTA) 60 mg capsule, 150 mg daily. hydrOXYzine (ATARAX) 50 mg tablet, take 1 tablet by mouth every 12 hours  multivitamin (ONE A DAY) tablet, Take 1 Tab by mouth daily. spironolactone (ALDACTONE) 25 mg tablet, Take 25 mg by mouth daily. coenzyme q10-vitamin e (COQ10 ) 100-100 mg-unit cap, Take  by mouth two (2) times a day. clonazepam (KLONOPIN) 0.5 mg tablet, Take  by mouth two (2) times a day. rosuvastatin (CRESTOR) 40 mg tablet, Take 40 mg by mouth daily. NON FORMULARY   fenofibrate micronized (LOFIBRA) 134 mg capsule, Take  by mouth every morning. aspirin 81 mg chewable tablet, Take 81 mg by mouth daily. CALCIUM CARBONATE/VITAMIN D3 (CALCIUM 600 + D,3, PO), Take 1 Cap by mouth daily. Leg Pain    The history is provided by the patient. This is a chronic problem. The current episode started more than 1 week ago. The problem occurs daily. The problem has been gradually worsening. Associated symptoms include limited range of motion and back pain. The symptoms are aggravated by activity. She has tried OTC pain medications, arthritis medications, heat and rest for the symptoms. The treatment provided mild relief. Review of Systems   Constitutional: Negative for chills and fever. HENT: Negative for congestion and sore throat.     Respiratory: Negative for shortness of breath. Cardiovascular: Negative for chest pain. Musculoskeletal: Positive for back pain and joint pain. Physical Exam  Vitals signs and nursing note reviewed. Constitutional:       Appearance: Normal appearance. She is well-developed. Cardiovascular:      Rate and Rhythm: Normal rate and regular rhythm. Pulmonary:      Effort: Pulmonary effort is normal.      Breath sounds: Normal breath sounds. Musculoskeletal:      Right lower leg: No edema. Left lower leg: No edema. Skin:     General: Skin is warm and dry. Neurological:      General: No focal deficit present. Mental Status: She is alert and oriented to person, place, and time. Psychiatric:         Mood and Affect: Mood normal.         Behavior: Behavior normal.         ASSESSMENT and PLAN    ICD-10-CM ICD-9-CM    1. Chronic low back pain, unspecified back pain laterality, unspecified whether sciatica present  M54.5 724.2 methylPREDNISolone (MEDROL DOSEPACK) 4 mg tablet    G89.29 338.29 REFERRAL TO PHYSICAL THERAPY   2. Bilateral low back pain with bilateral sciatica, unspecified chronicity  M54.42 724.3 methylPREDNISolone (MEDROL DOSEPACK) 4 mg tablet    M54.41 724.2 REFERRAL TO PHYSICAL THERAPY   3. Peripheral vascular disease (HCC)  I73.9 443.9      Reviewed MRI with patient. Nothing likely surgical seen. Advised referral to PT is indicated. [facility]  Consider referral to pain management    Will try a medrol dose pack--advised pt watch her blood sugar on this  Lidoderm patch PA was completed by LPN.  approved    F/u as appointed Dec 8th

## 2020-11-18 NOTE — PROGRESS NOTES
ROOM # 6    [facility] presents today for   Chief Complaint   Patient presents with    Leg Pain     bilateral        [facility] preferred language for health care discussion is english/other. Is someone accompanying this pt? no    Is the patient using any DME equipment during OV? no    Depression Screening:  3 most recent PHQ Screens 6/4/2020 10/18/2018 10/8/2018 9/25/2018 12/14/2017 7/18/2017 3/16/2017   PHQ Not Done - - - - - Active Diagnosis of Depression or Bipolar Disorder Active Diagnosis of Depression or Bipolar Disorder   Little interest or pleasure in doing things More than half the days Not at all Not at all Not at all Not at all Not at all -   Feeling down, depressed, irritable, or hopeless Nearly every day Not at all Not at all Several days Not at all Not at all -   Total Score PHQ 2 5 0 0 1 0 0 -       Learning Assessment:  Learning Assessment 11/24/2014   PRIMARY LEARNER Patient   HIGHEST LEVEL OF EDUCATION - PRIMARY LEARNER  2 YEARS [facility] PRIMARY LEARNER NONE   CO-LEARNER CAREGIVER No   PRIMARY LANGUAGE ENGLISH   LEARNER PREFERENCE PRIMARY DEMONSTRATION   ANSWERED BY self   RELATIONSHIP SELF       Abuse Screening:  No flowsheet data found. Fall Risk  No flowsheet data found. Health Maintenance reviewed and discussed per provider. Yes    [facility] is due for   Health Maintenance Due   Topic Date Due    MICROALBUMIN Q1  01/02/2020     Please order/place referral if appropriate. Advance Directive:  1. Do you have an advance directive in place? Patient Reply: no    2. If not, would you like material regarding how to put one in place? Patient Reply: no    Coordination of Care:  1. Have you been to the ER, urgent care clinic since your last visit? Hospitalized since your last visit? no    2. Have you seen or consulted any other health care providers outside of the [address] [place] [address] since your last visit?  Include any pap smears or colon screening.  no

## 2020-12-08 ENCOUNTER — OFFICE VISIT (OUTPATIENT)
Dept: INTERNAL MEDICINE CLINIC | Age: 60
End: 2020-12-08
Payer: MEDICARE

## 2020-12-08 VITALS
HEART RATE: 98 BPM | WEIGHT: 264 LBS | HEIGHT: 67 IN | BODY MASS INDEX: 41.44 KG/M2 | TEMPERATURE: 97.5 F | OXYGEN SATURATION: 99 % | SYSTOLIC BLOOD PRESSURE: 117 MMHG | RESPIRATION RATE: 22 BRPM | DIASTOLIC BLOOD PRESSURE: 70 MMHG

## 2020-12-08 DIAGNOSIS — E11.21 TYPE 2 DIABETES MELLITUS WITH NEPHROPATHY (HCC): ICD-10-CM

## 2020-12-08 DIAGNOSIS — I10 ESSENTIAL HYPERTENSION: ICD-10-CM

## 2020-12-08 DIAGNOSIS — Z12.31 SCREENING MAMMOGRAM FOR HIGH-RISK PATIENT: ICD-10-CM

## 2020-12-08 DIAGNOSIS — M85.80 OSTEOPENIA, UNSPECIFIED LOCATION: ICD-10-CM

## 2020-12-08 DIAGNOSIS — M54.2 NECK PAIN: Primary | ICD-10-CM

## 2020-12-08 DIAGNOSIS — R25.2 LEG CRAMPS: ICD-10-CM

## 2020-12-08 DIAGNOSIS — M89.9 DISORDER OF BONE AND CARTILAGE: ICD-10-CM

## 2020-12-08 DIAGNOSIS — M94.9 DISORDER OF BONE AND CARTILAGE: ICD-10-CM

## 2020-12-08 DIAGNOSIS — Z78.0 ASYMPTOMATIC MENOPAUSAL STATE: ICD-10-CM

## 2020-12-08 PROCEDURE — 3017F COLORECTAL CA SCREEN DOC REV: CPT | Performed by: INTERNAL MEDICINE

## 2020-12-08 PROCEDURE — G8417 CALC BMI ABV UP PARAM F/U: HCPCS | Performed by: INTERNAL MEDICINE

## 2020-12-08 PROCEDURE — 99214 OFFICE O/P EST MOD 30 MIN: CPT | Performed by: INTERNAL MEDICINE

## 2020-12-08 PROCEDURE — G8432 DEP SCR NOT DOC, RNG: HCPCS | Performed by: INTERNAL MEDICINE

## 2020-12-08 PROCEDURE — G8752 SYS BP LESS 140: HCPCS | Performed by: INTERNAL MEDICINE

## 2020-12-08 PROCEDURE — G9899 SCRN MAM PERF RSLTS DOC: HCPCS | Performed by: INTERNAL MEDICINE

## 2020-12-08 PROCEDURE — 2022F DILAT RTA XM EVC RTNOPTHY: CPT | Performed by: INTERNAL MEDICINE

## 2020-12-08 PROCEDURE — G8427 DOCREV CUR MEDS BY ELIG CLIN: HCPCS | Performed by: INTERNAL MEDICINE

## 2020-12-08 PROCEDURE — 3044F HG A1C LEVEL LT 7.0%: CPT | Performed by: INTERNAL MEDICINE

## 2020-12-08 PROCEDURE — G8754 DIAS BP LESS 90: HCPCS | Performed by: INTERNAL MEDICINE

## 2020-12-08 RX ORDER — TIZANIDINE 4 MG/1
4 TABLET ORAL
Qty: 30 TAB | Refills: 2 | Status: SHIPPED | OUTPATIENT
Start: 2020-12-08 | End: 2021-04-26 | Stop reason: SDUPTHER

## 2020-12-08 NOTE — PROGRESS NOTES
HISTORY OF PRESENT ILLNESS  Louie Andrews is a 62 y.o. female. Visit Vitals  /70 (BP 1 Location: Left arm, BP Patient Position: Sitting)   Pulse 98   Temp 97.5 °F (36.4 °C) (Temporal)   Resp 22   Ht 5' 7\" (1.702 m)   Wt 264 lb (119.7 kg)   SpO2 99%   BMI 41.35 kg/m²               Current Outpatient Medications:  tiZANidine (ZANAFLEX) 4 mg tablet, Take 1 Tab by mouth nightly as needed for Muscle Spasm(s). Indications: muscle spasm  methylPREDNISolone (MEDROL DOSEPACK) 4 mg tablet, Take as per package instructions  lidocaine (LIDODERM) 5 %, Apply patch to the affected area for 12 hours a day and remove for 12 hours a day. Amitiza 24 mcg capsule, take 1 capsule by mouth once daily (WITH BREAKFAST)  omeprazole (PRILOSEC) 20 mg capsule, take 1 capsule by mouth twice a day  lubiPROStone (Amitiza) 8 mcg capsule, take 1 capsule by mouth once daily WITH SUPPER  Nyamyc powder, APPLY A THIN LAYER UNDER BREASTS TWICE A DAY AS NEEDED FOR FLARE  enalapril (VASOTEC) 20 mg tablet, take 1 tablet by mouth once daily  SUMAtriptan succinate 6 mg/0.5 mL kit, inject 1 dose (6 MG) subcutaneously for migraines  TRULICITY 1.5 RL/6.9 mL sub-q pen,   cholecalciferol (VITAMIN D3) 25 mcg (1,000 unit) cap, Vitamin D3 25 mcg (1,000 unit) capsule   Take by oral route. DULoxetine (CYMBALTA) 30 mg capsule,   nitrofurantoin, macrocrystal-monohydrate, (MACROBID) 100 mg capsule, Take 1 Cap by mouth nightly. camphor-menthol (SARNA ORIGINAL) 0.5-0.5 % lotion, Apply  to affected area as needed for Itching. diclofenac (VOLTAREN) 1 % gel, Apply 2 g to affected area four (4) times daily. estradiol (ESTRACE) 0.01 % (0.1 mg/gram) vaginal cream, INSERT 2 GRAM INTO VAGINA DAILY. APPLY FINGERTIP AMOUNT TO OUTSIDE OF VAGINAL OPENING  triamcinolone acetonide (KENALOG) 0.025 % topical cream, Apply  to affected area two (2) times a day.  use thin layer  acetaminophen (TYLENOL EXTRA STRENGTH) 500 mg tablet, Take 1 Tab by mouth every six (6) hours as needed for Pain. cyanocobalamin 1,000 mcg tablet, Take 1 Tab by Mouth Once a Day. Insulin Needles, Disposable, (BD ULTRA-FINE MINI PEN NEEDLE) 31 gauge x 3/16\" ndle, BD Ultra-Fine Mini Pen Needle 31 gauge x 3/16\"  lancets (ONETOUCH DELICA LANCETS) 30 gauge misc, OneTouch Delica Lancets 30 gauge  EVELIA PEN NEEDLE 32 gauge x 5/32\" ndle, inject once daily  carvedilol (COREG) 12.5 mg tablet, take 1 tablet by mouth twice a day  DULoxetine (CYMBALTA) 60 mg capsule, 150 mg daily. hydrOXYzine (ATARAX) 50 mg tablet, take 1 tablet by mouth every 12 hours  multivitamin (ONE A DAY) tablet, Take 1 Tab by mouth daily. spironolactone (ALDACTONE) 25 mg tablet, Take 25 mg by mouth daily. coenzyme q10-vitamin e (COQ10 ) 100-100 mg-unit cap, Take  by mouth two (2) times a day. clonazepam (KLONOPIN) 0.5 mg tablet, Take  by mouth two (2) times a day. rosuvastatin (CRESTOR) 40 mg tablet, Take 40 mg by mouth daily. NON FORMULARY   fenofibrate micronized (LOFIBRA) 134 mg capsule, Take  by mouth every morning. aspirin 81 mg chewable tablet, Take 81 mg by mouth daily. CALCIUM CARBONATE/VITAMIN D3 (CALCIUM 600 + D,3, PO), Take 1 Cap by mouth daily. Hypertension    The history is provided by the patient. This is a chronic problem. The current episode started more than 1 week ago. The problem has not changed since onset. There are no associated agents to hypertension. Risk factors include obesity, postmenopause, a sedentary lifestyle, hypertension, diabetes mellitus and dyslipidemia. Diabetes   The history is provided by the patient (she is followed and managed at [facility]). This is a chronic problem. The current episode started more than 1 week ago. The problem occurs daily. The problem has been gradually improving. Exacerbated by: diet. The symptoms are relieved by medications (diet). Treatments tried: see med list.   Neck Pain   The history is provided by the patient. This is a new problem.  The current episode started more than 1 week ago. The problem occurs daily. The problem has not changed since onset. Review of Systems       Physical Exam  Vitals signs and nursing note reviewed. Constitutional:       Appearance: Normal appearance. She is well-developed. Neck:     Cardiovascular:      Rate and Rhythm: Normal rate and regular rhythm. Pulmonary:      Effort: Pulmonary effort is normal.      Breath sounds: Normal breath sounds. Skin:     General: Skin is warm and dry. Neurological:      General: No focal deficit present. Mental Status: She is alert and oriented to person, place, and time. Psychiatric:         Mood and Affect: Mood normal.         Behavior: Behavior normal.         ASSESSMENT and PLAN    ICD-10-CM ICD-9-CM    1. Neck pain  M54.2 723.1 XR SPINE CERV 4 OR 5 V   2. Type 2 diabetes mellitus with nephropathy (HCC)  E11.21 250.40      583.81    3. Essential hypertension  I10 401.9    4. Leg cramps  R25.2 729.82 tiZANidine (ZANAFLEX) 4 mg tablet   5. Osteopenia, unspecified location  M85.80 733.90 DEXA BONE DENSITY STUDY AXIAL   6. Asymptomatic menopausal state   Z78.0 V49.81 DEXA BONE DENSITY STUDY AXIAL   7. Disorder of bone and cartilage  M89.9 733.90 DEXA BONE DENSITY STUDY AXIAL    M94.9     8. Screening mammogram for high-risk patient  Z12.31 V76.11 K[facility]I MAMMO BI SCREENING INCL CAD         BP controlled    DM managed by [facility]--need records. Her HBA1c was 6.2 recently there per pt. Will request records    Neck pain with some spasm. Xray.   Increase tizanidine (which was for leg cramps) as it may help the neck  Pt is considering chiropractic care    F/u here 6 months for MWV, HTN, DM, chol

## 2020-12-08 NOTE — PROGRESS NOTES
ROOM # 5    Arnie Kinney presents today for   Chief Complaint   Patient presents with    Hypertension    Cholesterol Problem    Diabetes    Joint Pain       Arnie Kinney preferred language for health care discussion is english/other. Is someone accompanying this pt? no    Is the patient using any DME equipment during OV? no    Depression Screening:  3 most recent PHQ Screens 6/4/2020 10/18/2018 10/8/2018 9/25/2018 12/14/2017 7/18/2017 3/16/2017   PHQ Not Done - - - - - Active Diagnosis of Depression or Bipolar Disorder Active Diagnosis of Depression or Bipolar Disorder   Little interest or pleasure in doing things More than half the days Not at all Not at all Not at all Not at all Not at all -   Feeling down, depressed, irritable, or hopeless Nearly every day Not at all Not at all Several days Not at all Not at all -   Total Score PHQ 2 5 0 0 1 0 0 -       Learning Assessment:  Learning Assessment 11/24/2014   PRIMARY LEARNER Patient   HIGHEST LEVEL OF EDUCATION - PRIMARY LEARNER  2 YEARS [facility] PRIMARY LEARNER NONE   CO-LEARNER CAREGIVER No   PRIMARY LANGUAGE ENGLISH   LEARNER PREFERENCE PRIMARY DEMONSTRATION   ANSWERED BY self   RELATIONSHIP SELF       Abuse Screening:  No flowsheet data found. Fall Risk  No flowsheet data found. Health Maintenance reviewed and discussed per provider. Yes    Arnie Kinney is due for   Health Maintenance Due   Topic Date Due    MICROALBUMIN Q1  01/02/2020     Please order/place referral if appropriate. Advance Directive:  1. Do you have an advance directive in place? Patient Reply: no    2. If not, would you like material regarding how to put one in place? Patient Reply: no    Coordination of Care:  1. Have you been to the ER, urgent care clinic since your last visit? Hospitalized since your last visit? no    2.  Have you seen or consulted any other health care providers outside of the [address] [place] [address] since your last visit? Include any pap smears or colon screening.  no

## 2020-12-16 ENCOUNTER — DOCUMENTATION ONLY (OUTPATIENT)
Dept: INTERNAL MEDICINE CLINIC | Age: 60
End: 2020-12-16

## 2020-12-30 DIAGNOSIS — M79.10 MUSCLE PAIN: ICD-10-CM

## 2020-12-30 DIAGNOSIS — M54.41 BILATERAL LOW BACK PAIN WITH BILATERAL SCIATICA, UNSPECIFIED CHRONICITY: ICD-10-CM

## 2020-12-30 DIAGNOSIS — M54.42 BILATERAL LOW BACK PAIN WITH BILATERAL SCIATICA, UNSPECIFIED CHRONICITY: ICD-10-CM

## 2021-01-04 DIAGNOSIS — Z76.0 MEDICATION REFILL: ICD-10-CM

## 2021-01-04 RX ORDER — ENALAPRIL MALEATE 20 MG/1
TABLET ORAL
Qty: 30 TAB | Refills: 5 | Status: SHIPPED | OUTPATIENT
Start: 2021-01-04 | End: 2021-07-02

## 2021-01-11 ENCOUNTER — TELEPHONE (OUTPATIENT)
Dept: INTERNAL MEDICINE CLINIC | Age: 61
End: 2021-01-11

## 2021-01-19 NOTE — TELEPHONE ENCOUNTER
PA denied for Amitiza 24mg.  Patient would need to have tried at least three of the below:    Ping Cardoza

## 2021-01-26 NOTE — TELEPHONE ENCOUNTER
Pt contacted at home number. 2 pt identifiers confirmed. Pt informed of below. Pt states that the Linzess is $30 a bottle. She wants to know if we have any coupons. Informed pt to go online to [employer]. Pt also given names of other medications that she will proce with the pharmacy. Pt verbalized understanding. Pt also wants to know if Dr. Obdulio Fisher recommends she get COVID vaccination. Please advise.

## 2021-02-25 DIAGNOSIS — M85.80 OSTEOPENIA, UNSPECIFIED LOCATION: ICD-10-CM

## 2021-02-25 DIAGNOSIS — M94.9 DISORDER OF BONE AND CARTILAGE: ICD-10-CM

## 2021-02-25 DIAGNOSIS — Z78.0 ASYMPTOMATIC MENOPAUSAL STATE: ICD-10-CM

## 2021-02-25 DIAGNOSIS — M89.9 DISORDER OF BONE AND CARTILAGE: ICD-10-CM

## 2021-04-13 DIAGNOSIS — Z12.31 SCREENING MAMMOGRAM FOR HIGH-RISK PATIENT: ICD-10-CM

## 2021-04-15 ENCOUNTER — TELEPHONE (OUTPATIENT)
Dept: INTERNAL MEDICINE CLINIC | Age: 61
End: 2021-04-15

## 2021-04-15 DIAGNOSIS — E66.01 CLASS 3 SEVERE OBESITY DUE TO EXCESS CALORIES WITH SERIOUS COMORBIDITY AND BODY MASS INDEX (BMI) OF 40.0 TO 44.9 IN ADULT (HCC): Primary | ICD-10-CM

## 2021-04-15 DIAGNOSIS — E11.21 TYPE 2 DIABETES MELLITUS WITH NEPHROPATHY (HCC): ICD-10-CM

## 2021-04-15 NOTE — TELEPHONE ENCOUNTER
Patient is calling in stating she scheduled an appt with Dr. Marguerite Cramer for weigh loss. States she has an appt on 4/28/21 but was just informed they need a referral  from her PCP as they do not do \"self-referred\" appointments. Patient states they need the referral from her PCP and office notes faxed over before her appt.     Dr. Marguerite Cramer  [address]    [phone](NUD)

## 2021-04-26 ENCOUNTER — OFFICE VISIT (OUTPATIENT)
Dept: INTERNAL MEDICINE CLINIC | Age: 61
End: 2021-04-26
Payer: MEDICARE

## 2021-04-26 VITALS
HEART RATE: 99 BPM | TEMPERATURE: 97.2 F | WEIGHT: 267.2 LBS | RESPIRATION RATE: 20 BRPM | BODY MASS INDEX: 41.94 KG/M2 | SYSTOLIC BLOOD PRESSURE: 116 MMHG | HEIGHT: 67 IN | OXYGEN SATURATION: 97 % | DIASTOLIC BLOOD PRESSURE: 80 MMHG

## 2021-04-26 DIAGNOSIS — M76.61 ACHILLES TENDINITIS OF RIGHT LOWER EXTREMITY: ICD-10-CM

## 2021-04-26 DIAGNOSIS — M54.42 BILATERAL LOW BACK PAIN WITH BILATERAL SCIATICA, UNSPECIFIED CHRONICITY: ICD-10-CM

## 2021-04-26 DIAGNOSIS — M79.604 PAIN IN BOTH LOWER EXTREMITIES: Primary | ICD-10-CM

## 2021-04-26 DIAGNOSIS — E11.21 TYPE 2 DIABETES MELLITUS WITH NEPHROPATHY (HCC): ICD-10-CM

## 2021-04-26 DIAGNOSIS — I73.9 PERIPHERAL VASCULAR DISEASE (HCC): ICD-10-CM

## 2021-04-26 DIAGNOSIS — M79.605 PAIN IN BOTH LOWER EXTREMITIES: Primary | ICD-10-CM

## 2021-04-26 DIAGNOSIS — M54.41 BILATERAL LOW BACK PAIN WITH BILATERAL SCIATICA, UNSPECIFIED CHRONICITY: ICD-10-CM

## 2021-04-26 DIAGNOSIS — R25.2 LEG CRAMPS: ICD-10-CM

## 2021-04-26 PROCEDURE — G8752 SYS BP LESS 140: HCPCS | Performed by: INTERNAL MEDICINE

## 2021-04-26 PROCEDURE — 3017F COLORECTAL CA SCREEN DOC REV: CPT | Performed by: INTERNAL MEDICINE

## 2021-04-26 PROCEDURE — G8432 DEP SCR NOT DOC, RNG: HCPCS | Performed by: INTERNAL MEDICINE

## 2021-04-26 PROCEDURE — G8754 DIAS BP LESS 90: HCPCS | Performed by: INTERNAL MEDICINE

## 2021-04-26 PROCEDURE — G8427 DOCREV CUR MEDS BY ELIG CLIN: HCPCS | Performed by: INTERNAL MEDICINE

## 2021-04-26 PROCEDURE — 2022F DILAT RTA XM EVC RTNOPTHY: CPT | Performed by: INTERNAL MEDICINE

## 2021-04-26 PROCEDURE — G9899 SCRN MAM PERF RSLTS DOC: HCPCS | Performed by: INTERNAL MEDICINE

## 2021-04-26 PROCEDURE — 3046F HEMOGLOBIN A1C LEVEL >9.0%: CPT | Performed by: INTERNAL MEDICINE

## 2021-04-26 PROCEDURE — G8417 CALC BMI ABV UP PARAM F/U: HCPCS | Performed by: INTERNAL MEDICINE

## 2021-04-26 PROCEDURE — 99214 OFFICE O/P EST MOD 30 MIN: CPT | Performed by: INTERNAL MEDICINE

## 2021-04-26 RX ORDER — TIZANIDINE 4 MG/1
4 TABLET ORAL
Qty: 30 TAB | Refills: 2 | Status: SHIPPED | OUTPATIENT
Start: 2021-04-26 | End: 2022-10-28 | Stop reason: SDUPTHER

## 2021-04-26 NOTE — PROGRESS NOTES
HISTORY OF PRESENT ILLNESS  Dwain Patterson is a 62 y.o. female. /80 (BP 1 Location: Right arm, BP Patient Position: Sitting, BP Cuff Size: Adult)   Pulse 99   Temp 97.2 °F (36.2 °C) (Temporal)   Resp 20   Ht 5' 7\" (1.702 m)   Wt 247 lb (112 kg)   SpO2 97%   BMI 38.69 kg/m²     2-3 weeks dull aching deep pain in both legs. The right Achilles is also painful    After walking a while she has to go lie down. Has tried topical meds that have not helped. She has not tried any tylenol  Heat does not seem to help. Leg Pain   The history is provided by the patient. This is a new problem. The current episode started more than 1 week ago. The problem occurs daily. The problem has not changed since onset. Associated symptoms include back pain. Pertinent negatives include no tingling. Back Pain   The history is provided by the patient. This is a recurrent problem. The current episode started more than 1 week ago. The problem has been gradually worsening. Associated symptoms include leg pain. Pertinent negatives include no fever, no tingling and no weakness. Review of Systems   Constitutional: Positive for malaise/fatigue. Negative for chills and fever. Musculoskeletal: Positive for back pain, joint pain and myalgias. Neurological: Negative for tingling, sensory change and weakness. Physical Exam  Vitals signs and nursing note reviewed. Constitutional:       Appearance: Normal appearance. She is well-developed. Cardiovascular:      Rate and Rhythm: Normal rate. Pulmonary:      Effort: Pulmonary effort is normal.   Musculoskeletal:      Comments: Pain on compressing the right Achilles tendon. Feet have adequate DP pulses. Good color. No calf tenderness. No edema today     Skin:     General: Skin is warm and dry. Neurological:      General: No focal deficit present. Mental Status: She is alert and oriented to person, place, and time.    Psychiatric:         Mood and Affect: Mood normal.         Behavior: Behavior normal.         ASSESSMENT and PLAN    ICD-10-CM ICD-9-CM    1. Pain in both lower extremities  M79.604 729.5 DUPLEX LO EXTREM ART BILAT    M79.605  REFERRAL TO PHYSICAL THERAPY   2. Type 2 diabetes mellitus with nephropathy ([facility])  E11.21 250.40      583.81    3. Peripheral vascular disease ([facility])  I73.9 443.9    4. Body mass index (BMI)40.0-44.9, adult ([facility])  Z68.41 V85.41    5. Bilateral low back pain with bilateral sciatica, unspecified chronicity  M54.42 724.3 REFERRAL TO PHYSICAL THERAPY    M54.41 724.2    6. Achilles tendinitis of right lower extremity  M76.61 726.71 REFERRAL TO PHYSICAL THERAPY   7. Leg cramps  R25.2 729.82 tiZANidine (ZANAFLEX) 4 mg tablet         Nothing of note except the achilles tendonitis  Will refer to PT  Refill the tizandine    Will check vascular status  Consider also neuropathic.  But I suspect this is all orthopedic and partially related to her weight    Already on cymbalta, muscle relaxer    F/u as appointed in June

## 2021-04-26 NOTE — PROGRESS NOTES
Reviewed record in preparation for visit and have obtained necessary documentation. Trish Ruvalcaba is a 62 y.o.  female presents today for office visit for   Chief Complaint   Patient presents with    Leg Pain     biateral for 2-3 weeks   . Pt is not fasting. Patient is accompanied by self. Pt is in Room # [id number] preferred language for health care discussion is english/other. Is the patient using any DME equipment during OV? NO    Advance Directive:  1. Do you have an advance directive in place? Patient Reply: NO    2. If not, would you like material regarding how to put one in place? NO    Coordination of Care:  1. Have you been to the ER, urgent care clinic since your last visit? Hospitalized since your last visit? NO    2. Have you seen or consulted any other health care providers outside of the [address] [place] [address] since your last visit? Include any pap smears or colon screening. NO    Upcoming Appts  No    VORB: No orders of the defined types were placed in this encounter.  Isaias Peters MD/Ninoska Casey LPN    Trish Ruvalcaba is due for:   Health Maintenance Due   Topic    Eye Exam Retinal or Dilated     MICROALBUMIN Q1     Lipid Screen      Health Maintenance reviewed and discussed per provider  Please order/place referral if appropriate.     Requested Prescriptions      No prescriptions requested or ordered in this encounter       Learning Assessment:  Learning Assessment 11/24/2014   PRIMARY LEARNER Patient   HIGHEST LEVEL OF EDUCATION - PRIMARY LEARNER  2 YEARS OF COLLEGE   BARRIERS PRIMARY LEARNER NONE   CO-LEARNER CAREGIVER No   PRIMARY LANGUAGE ENGLISH   LEARNER PREFERENCE PRIMARY DEMONSTRATION   ANSWERED BY self   RELATIONSHIP SELF     Depression Screening:  3 most recent [facility] Screens 6/4/2020 10/18/2018 10/8/2018 9/25/2018 12/14/2017 7/18/2017 3/16/2017   PHQ Not Done - - - - - Active Diagnosis of Depression or Bipolar Disorder Active Diagnosis of Depression or Bipolar Disorder   Little interest or pleasure in doing things More than half the days Not at all Not at all Not at all Not at all Not at all -   Feeling down, depressed, irritable, or hopeless Nearly every day Not at all Not at all Several days Not at all Not at all -   Total Score PHQ 2 5 0 0 1 0 0 -     Abuse Screening:  No flowsheet data found. Fall Risk  No flowsheet data found.   Recent Travel Screening and Travel History documentation     Travel Screening      No screening recorded since 04/25/21 0000      Travel History   Travel since 03/26/21     No documented travel since 03/26/21

## 2021-05-12 LAB — HBA1C MFR BLD HPLC: 6.5 %

## 2021-05-25 ENCOUNTER — DOCUMENTATION ONLY (OUTPATIENT)
Dept: INTERNAL MEDICINE CLINIC | Age: 61
End: 2021-05-25

## 2021-06-10 ENCOUNTER — HOSPITAL ENCOUNTER (OUTPATIENT)
Dept: LAB | Age: 61
Discharge: HOME | End: 2021-06-10
Payer: MEDICARE

## 2021-06-10 ENCOUNTER — OFFICE VISIT (OUTPATIENT)
Dept: INTERNAL MEDICINE CLINIC | Age: 61
End: 2021-06-10
Payer: MEDICARE

## 2021-06-10 VITALS
BODY MASS INDEX: 41.56 KG/M2 | DIASTOLIC BLOOD PRESSURE: 69 MMHG | SYSTOLIC BLOOD PRESSURE: 98 MMHG | HEIGHT: 67 IN | OXYGEN SATURATION: 99 % | RESPIRATION RATE: 18 BRPM | HEART RATE: 85 BPM | WEIGHT: 264.8 LBS | TEMPERATURE: 97.9 F

## 2021-06-10 DIAGNOSIS — I10 ESSENTIAL HYPERTENSION: ICD-10-CM

## 2021-06-10 DIAGNOSIS — Z00.00 MEDICARE ANNUAL WELLNESS VISIT, SUBSEQUENT: Primary | ICD-10-CM

## 2021-06-10 DIAGNOSIS — E11.21 TYPE 2 DIABETES MELLITUS WITH NEPHROPATHY (HCC): ICD-10-CM

## 2021-06-10 LAB
CHOLEST SERPL-MCNC: 113 MG/DL
CREAT UR-MCNC: 172 MG/DL (ref 30–125)
HDLC SERPL-MCNC: 55 MG/DL (ref 40–60)
HDLC SERPL: 2.1 {RATIO} (ref 0–5)
LDLC SERPL CALC-MCNC: 40.4 MG/DL (ref 0–100)
LIPID PROFILE,FLP: NORMAL
MICROALBUMIN UR-MCNC: 1.85 MG/DL (ref 0–3)
MICROALBUMIN/CREAT UR-RTO: 11 MG/G (ref 0–30)
TRIGL SERPL-MCNC: 88 MG/DL (ref ?–150)
VLDLC SERPL CALC-MCNC: 17.6 MG/DL

## 2021-06-10 PROCEDURE — 80061 LIPID PANEL: CPT

## 2021-06-10 PROCEDURE — 36415 COLL VENOUS BLD VENIPUNCTURE: CPT

## 2021-06-10 PROCEDURE — 3044F HG A1C LEVEL LT 7.0%: CPT | Performed by: INTERNAL MEDICINE

## 2021-06-10 PROCEDURE — G9899 SCRN MAM PERF RSLTS DOC: HCPCS | Performed by: INTERNAL MEDICINE

## 2021-06-10 PROCEDURE — G8752 SYS BP LESS 140: HCPCS | Performed by: INTERNAL MEDICINE

## 2021-06-10 PROCEDURE — 82043 UR ALBUMIN QUANTITATIVE: CPT

## 2021-06-10 PROCEDURE — G8754 DIAS BP LESS 90: HCPCS | Performed by: INTERNAL MEDICINE

## 2021-06-10 PROCEDURE — G9717 DOC PT DX DEP/BP F/U NT REQ: HCPCS | Performed by: INTERNAL MEDICINE

## 2021-06-10 PROCEDURE — G0439 PPPS, SUBSEQ VISIT: HCPCS | Performed by: INTERNAL MEDICINE

## 2021-06-10 PROCEDURE — 99213 OFFICE O/P EST LOW 20 MIN: CPT | Performed by: INTERNAL MEDICINE

## 2021-06-10 PROCEDURE — 3017F COLORECTAL CA SCREEN DOC REV: CPT | Performed by: INTERNAL MEDICINE

## 2021-06-10 PROCEDURE — G8417 CALC BMI ABV UP PARAM F/U: HCPCS | Performed by: INTERNAL MEDICINE

## 2021-06-10 PROCEDURE — G8427 DOCREV CUR MEDS BY ELIG CLIN: HCPCS | Performed by: INTERNAL MEDICINE

## 2021-06-10 PROCEDURE — 2022F DILAT RTA XM EVC RTNOPTHY: CPT | Performed by: INTERNAL MEDICINE

## 2021-06-10 RX ORDER — FERROUS SULFATE 325(65) MG
1 TABLET, DELAYED RELEASE (ENTERIC COATED) ORAL 2 TIMES DAILY
COMMUNITY
Start: 2021-05-19 | End: 2021-12-09

## 2021-06-10 RX ORDER — METFORMIN HYDROCHLORIDE 500 MG/1
1 TABLET ORAL
COMMUNITY
Start: 2021-05-26

## 2021-06-10 NOTE — PROGRESS NOTES
This is the Subsequent Medicare Annual Wellness Exam, performed 12 months or more after the Initial AWV or the last Subsequent AWV    I have reviewed the patient's medical history in detail and updated the computerized patient record. BP 98/69 (BP 1 Location: Right arm, BP Patient Position: Sitting, BP Cuff Size: Adult) Comment: pre- medication, manual  Pulse 85   Temp 97.9 °F (36.6 °C) (Temporal)   Resp 18   Ht 5' 7\" (1.702 m)   Wt 264 lb 12.8 oz (120.1 kg)   SpO2 99%   BMI 41.47 kg/m²       Assessment/Plan   Education and counseling provided:  Are appropriate based on today's review and evaluation    1. Medicare annual wellness visit, subsequent       Depression Risk Factor Screening     3 most recent PHQ Screens 6/10/2021   PHQ Not Done Active Diagnosis of Depression or Bipolar Disorder   Little interest or pleasure in doing things -   Feeling down, depressed, irritable, or hopeless -   Total Score PHQ 2 -       Alcohol Risk Screen    Do you average more than 1 drink per night or more than 7 drinks a week:  No    On any one occasion in the past three months have you have had more than 3 drinks containing alcohol:  No        Functional Ability and Level of Safety    Hearing: Hearing is good. Activities of Daily Living: The home contains: no safety equipment. and added raised toilet seat  Patient does total self care      Ambulation: with difficulty, can't walk further than 2-3 blocks     Fall Risk:  Fall Risk Assessment, last 12 mths 6/10/2021   Able to walk? Yes   Fall in past 12 months? 0   Do you feel unsteady? 0   Are you worried about falling 0      Abuse Screen:  Patient is not abused       Cognitive Screening    Has your family/caregiver stated any concerns about your memory: no     Cognitive Screening: Normal - fruit naming this year.     Health Maintenance Due     Health Maintenance Due   Topic Date Due    Eye Exam Retinal or Dilated  01/16/2021    MICROALBUMIN Q1  01/31/2021    Lipid Screen  01/31/2021    Foot Exam Q1  06/04/2021       Patient Care Team   Patient Care Team:  Carmella Mccarty MD as PCP - General (Internal Medicine)  Carmella Mccarty MD as PCP - [address] [place] [address]  [facility] Provider  Dylan Arce MD as Consulting Provider (Endocrinology)  Patricia Banks MD as Consulting Provider (Obstetrics & Gynecology)  Sam Ware MD as Consulting Provider (Orthopedic Surgery)  Sujata Meyer MD as Consulting Provider (Urology)  Vernell Camilo MD as Physician (Optometry)  Jerod Claros MD as Consulting Provider (Orthopedic Surgery)  Rios Morgan MD as Physician (Gastroenterology)  Magy Stacy MD as Consulting Provider ([address] [place] [address] Vascular Surgery)  Buddy Soares MD (Audiology)  Gaston Sheth NP as Nurse Practitioner (Endocrinology)  Alejandro Alpers, MD as Physician (Internal Medicine)    History     Patient Active Problem List   Diagnosis Code    Migraine aura without headache G43. 109    Fatigue R53.83    Urge incontinence N39.41    OAB (overactive bladder) N32.81    Disorder of urinary tract N39.9    Constipation K59.00    Urinary incontinence R32    Dysfunctional voiding of urine N39.8    Hematuria R31.9    Type 2 diabetes mellitus without complication, without long-term current use of insulin (HCC) E11.9    Dyslipidemia E78.5    Essential hypertension I10    Type 2 diabetes mellitus with nephropathy (HCC) E11.21    Severe obesity (BMI 35.0-39. 9) with comorbidity ([address] E66.01    HTN (hypertension) I10    Diabetes ([address] E11.9    Peripheral vascular disease ([address] I73.9    Depression F32.9     Past Medical History:   Diagnosis Date    Abnormal bone density screening 3/10/2011    Anemia 1996    Arthritis     Baker's cyst     left    Cardiomyopathy ([address]     Chicken pox     Constipation     Constipation due to pain medication     CTS (carpal tunnel syndrome) 1999    bilat.     Depression 2000    Diabetes ([address]     Diabetes mellitus type 2 diabetes mellitus without complication, without long-term current use of insulin. type 2 diabetes mellitus with nephropathy     Disorder of urinary tract     Dysfunctional voiding of urine     Dyslipidemia     Essential hypertension     Fatigue     multifactorial    Fibromyalgia     GERD (gastroesophageal reflux disease)     H/O bone density study 2017    osteopenia, SEE MEDIA    Hematuria     History of meniscal tear     HTN (hypertension)     Incontinence     Metacarpal bone fracture     left 5th    Migraine     aura without headache    Nephropathy     due to diabetes?  OAB (overactive bladder)     documented on UDS     Obesity     Osteopenia 2006    Plantar fasciitis     Pre-syncope 16    Primary osteoarthritis of left knee     Renal insufficiency     PER DR. ABRAHAM CLEARANCE    Restless leg     Severe obesity (BMI 35.0-39. 9) with comorbidity [facility]     Sleep apnea     C-PAP    Type II or unspecified type diabetes mellitus with neurological manifestations, uncontrolled(250.62) (HCC)     Unspecified hereditary and idiopathic peripheral neuropathy     Urge incontinence     Urinary incontinence     unspec.  Urinary tract infection, site not specified     Vitamin D deficiency       Past Surgical History:   Procedure Laterality Date    CARDIAC CATHETERIZATION  2006    EGD  2008    HX ARTHROTOMY Left 2015    upper arm     HX CARPAL TUNNEL RELEASE Left 2016    wrist     HX  SECTION      x2    HX CHOLECYSTECTOMY      HX COLONOSCOPY  2014    HX ENDOSCOPY      ENDOSCOPY,COLON, DIAGNOSTIC    HX KNEE ARTHROSCOPY Left 2018    HX KNEE REPLACEMENT Left 2018    HX OTHER SURGICAL Left 2015    SHOULDER REPAIR     Current Outpatient Medications   Medication Sig Dispense Refill    metFORMIN (GLUCOPHAGE) 500 mg tablet Take 1 Tablet by mouth daily (with lunch).       ferrous sulfate (IRON) 325 mg (65 mg iron) EC tablet Take 1 Tablet by mouth two (2) times a day.  tiZANidine (ZANAFLEX) 4 mg tablet Take 1 Tab by mouth nightly as needed for Muscle Spasm(s). Indications: muscle spasm 30 Tab 2    linaCLOtide (Linzess) 290 mcg cap capsule Take 1 Cap by mouth Daily (before breakfast). Indications: chronic idiopathic constipation 30 Cap 5    enalapril (VASOTEC) 20 mg tablet take 1 tablet by mouth once daily 30 Tab 5    baclofen (LIORESAL) 10 mg tablet take 1 tablet by mouth three times a day      polyethylene glycol (MIRALAX) 17 gram/dose powder take 17GM (DISSOLVED IN WATER) by mouth once daily      estradioL (Estrace) 0.01 % (0.1 mg/gram) vaginal cream INSERT 2 GRAM INTO VAGINA DAILY. APPLY FINGERTIP AMOUNT TO OUTSIDE OF VAGINAL OPENING 42.5 g 11    lidocaine (LIDODERM) 5 % Apply patch to the affected area for 12 hours a day and remove for 12 hours a day. 30 Each 5    omeprazole (PRILOSEC) 20 mg capsule take 1 capsule by mouth twice a day 180 Cap 3    Nyamyc powder APPLY A THIN LAYER UNDER BREASTS TWICE A DAY AS NEEDED FOR FLARE 60 g 11    SUMAtriptan succinate 6 mg/0.5 mL kit inject 1 dose (6 MG) subcutaneously for migraines 1 Kit 11    TRULICITY 1.5 RA/3.9 mL sub-q pen       cholecalciferol (VITAMIN D3) 25 mcg (1,000 unit) cap Vitamin D3 25 mcg (1,000 unit) capsule   Take by oral route.  DULoxetine (CYMBALTA) 30 mg capsule       camphor-menthol (SARNA ORIGINAL) 0.5-0.5 % lotion Apply  to affected area as needed for Itching.  diclofenac (VOLTAREN) 1 % gel Apply 2 g to affected area four (4) times daily. 100 g 5    triamcinolone acetonide (KENALOG) 0.025 % topical cream Apply  to affected area two (2) times a day. use thin layer 15 g 5    acetaminophen (TYLENOL EXTRA STRENGTH) 500 mg tablet Take 1 Tab by mouth every six (6) hours as needed for Pain. 30 Tab 0    cyanocobalamin 1,000 mcg tablet Take 1 Tab by Mouth Once a Day.       Insulin Needles, Disposable, (BD ULTRA-FINE MINI PEN NEEDLE) 31 gauge x 3/16\" ndle BD Ultra-Fine Mini Pen Needle 31 gauge x 3/16\"      lancets (ONETOUCH DELICA LANCETS) 30 gauge misc OneTouch Delica Lancets 30 gauge      EVELIA PEN NEEDLE 32 gauge x 5/32\" ndle inject once daily  0    carvedilol (COREG) 12.5 mg tablet take 1 tablet by mouth twice a day  0    DULoxetine (CYMBALTA) 60 mg capsule 90 mg daily. 0    hydrOXYzine (ATARAX) 50 mg tablet take 1 tablet by mouth every 12 hours  0    multivitamin (ONE A DAY) tablet Take 1 Tab by mouth daily.  spironolactone (ALDACTONE) 25 mg tablet Take 25 mg by mouth daily.  coenzyme q10-vitamin e (COQ10 ) 100-100 mg-unit cap Take  by mouth two (2) times a day.  clonazepam (KLONOPIN) 0.5 mg tablet Take  by mouth two (2) times a day.  rosuvastatin (CRESTOR) 40 mg tablet Take 40 mg by mouth daily. NON FORMULARY       fenofibrate micronized (LOFIBRA) 134 mg capsule Take  by mouth every morning.  aspirin 81 mg chewable tablet Take 81 mg by mouth daily.  CALCIUM CARBONATE/VITAMIN D3 (CALCIUM 600 + D,3, PO) Take 1 Cap by mouth daily.        Allergies   Allergen Reactions    Digoxin Nausea Only    Niaspan [Niacin] Rash    Wellbutrin [Bupropion Hcl] Itching       Family History   Problem Relation Age of Onset    Hypertension Mother     Heart Disease Mother     Kidney Disease Mother     Heart Disease Father     Emphysema Father      Social History     Tobacco Use    Smoking status: Never Smoker    Smokeless tobacco: Never Used   Substance Use Topics    Alcohol use: No         Nhung Beatty MD

## 2021-06-10 NOTE — PROGRESS NOTES
Reviewed record in preparation for visit and have obtained necessary documentation. Amilcar Tong is a 62 y.o.  female presents today for office visit for   Chief Complaint   Patient presents with    Hypertension    Diabetes    Cholesterol Problem    Annual Wellness Visit   . Pt is  fasting. Patient is accompanied by self. [facility] [address] [facility] [address] preferred language for health care discussion is english/other. Is the patient using any DME equipment during OV? NO    Advance Directive:  1. Do you have an advance directive in place? Patient Reply: NO    2. If not, would you like material regarding how to put one in place? NO    Coordination of Care:  1. Have you been to the ER, urgent care clinic since your last visit? Hospitalized since your last visit? NO    2. Have you seen or consulted any other health care providers outside of the [address] [place] [address] since your last visit? Include any pap smears or colon screening. YES, Dr. Juarez, Cardiology 2021, Weight  2021, [facility] 2021    Upcoming Appts  Yes Weight Managment 6/2021    VORB: No orders of the defined types were placed in this encounter.  Carter Morin MD/Ninoska Mcclendon LPN    Amilcar Tong is due for:   Health Maintenance Due   Topic    Eye Exam Retinal or Dilated     MICROALBUMIN Q1     Lipid Screen     Foot Exam Q1     Medicare Yearly Exam      Health Maintenance reviewed and discussed per provider  Please order/place referral if appropriate.     Requested Prescriptions      No prescriptions requested or ordered in this encounter       Learning Assessment:  Learning Assessment 11/24/2014   PRIMARY LEARNER Patient   HIGHEST LEVEL OF EDUCATION - PRIMARY LEARNER  2 YEARS OF COLLEGE   BARRIERS PRIMARY LEARNER NONE   CO-LEARNER CAREGIVER No   PRIMARY LANGUAGE ENGLISH   LEARNER PREFERENCE PRIMARY DEMONSTRATION   ANSWERED BY self   RELATIONSHIP SELF     Depression Screening:  3 most recent [facility] 36 Screens 6/4/2020 10/18/2018 10/8/2018 9/25/2018 12/14/2017 7/18/2017 3/16/2017   PHQ Not Done - - - - - Active Diagnosis of Depression or Bipolar Disorder Active Diagnosis of Depression or Bipolar Disorder   Little interest or pleasure in doing things More than half the days Not at all Not at all Not at all Not at all Not at all -   Feeling down, depressed, irritable, or hopeless Nearly every day Not at all Not at all Several days Not at all Not at all -   Total Score PHQ 2 5 0 0 1 0 0 -     Abuse Screening:  No flowsheet data found. Fall Risk  No flowsheet data found.   Recent Travel Screening and Travel History documentation     Travel Screening      No screening recorded since 06/09/21 0000      Travel History   Travel since 05/10/21     No documented travel since 05/10/21

## 2021-06-10 NOTE — PROGRESS NOTES
HISTORY OF PRESENT ILLNESS  Savanna Givens is a 62 y.o. female. BP 98/69 (BP 1 Location: Right arm, BP Patient Position: Sitting, BP Cuff Size: Adult) Comment: pre- medication, manual  Pulse 85   Temp 97.9 °F (36.6 °C) (Temporal)   Resp 18   Ht 5' 7\" (1.702 m)   Wt 264 lb 12.8 oz (120.1 kg)   SpO2 99%   BMI 41.47 kg/m²     Hypertension   The history is provided by the patient. This is a chronic problem. The current episode started more than 1 week ago. The problem has not changed since onset. Pertinent negatives include no palpitations. Risk factors include diabetes mellitus and dyslipidemia. Diabetes  The history is provided by the patient. This is a chronic problem. The current episode started more than 1 week ago. The problem occurs daily. The symptoms are relieved by medications. Review of Systems   Constitutional: Negative for chills and fever. Respiratory: Negative for cough. Cardiovascular: Negative for palpitations. Musculoskeletal: Positive for joint pain. Endo/Heme/Allergies: Negative for polydipsia. Physical Exam  Vitals and nursing note reviewed. Constitutional:       Appearance: Normal appearance. She is well-developed. HENT:      Head: Normocephalic and atraumatic. Cardiovascular:      Rate and Rhythm: Normal rate and regular rhythm. Pulmonary:      Effort: Pulmonary effort is normal.      Breath sounds: Normal breath sounds. Musculoskeletal:      Right lower leg: No edema. Left lower leg: No edema. Skin:     General: Skin is warm and dry. Neurological:      General: No focal deficit present. Mental Status: She is alert and oriented to person, place, and time.       Comments: Diabetic foot exam:     Left Foot:   Visual Exam: hammertoes   Pulse DP: 2+ (normal)   Filament test: normal sensation    Vibratory sensation: diminished      Right Foot:   Visual Exam: hammertoes   Pulse DP: 2+ (normal)   Filament test: normal sensation    Vibratory sensation: diminished     Psychiatric:         Mood and Affect: Mood normal.         Behavior: Behavior normal.         ASSESSMENT and PLAN    ICD-10-CM ICD-9-CM           2. Essential hypertension  I10 401.9 LIPID PANEL   3. Type 2 diabetes mellitus with nephropathy (HCC)  E11.21 250.40 MICROALBUMIN, UR, RAND W/ MICROALB/CREAT RATIO     583.81  DIABETES FOOT EXAM      LIPID PANEL       BP controlled    DM has been good. She is folowed at the [facility]  Update missing lab--other lab is in the [email or web address] system    She is followed by cardiology. Most recent visit note not available    admits to struggling some with depression this year.  Has a therapist    Will request record for optometry    F/u 6 months for HTN, DM

## 2021-06-10 NOTE — PATIENT INSTRUCTIONS
Medicare Wellness Visit, Female     The best way to live healthy is to have a lifestyle where you eat a well-balanced diet, exercise regularly, limit alcohol use, and quit all forms of tobacco/nicotine, if applicable. Regular preventive services are another way to keep healthy. Preventive services (vaccines, screening tests, monitoring & exams) can help personalize your care plan, which helps you manage your own care. Screening tests can find health problems at the earliest stages, when they are easiest to treat. [facility] follows the current, evidence-based guidelines published by the [facility] ([facility]) when recommending preventive services for our patients. Because we follow these guidelines, sometimes recommendations change over time as research supports it. (For example, mammograms used to be recommended annually. Even though Medicare will still pay for an annual mammogram, the newer guidelines recommend a mammogram every two years for women of average risk). Of course, you and your doctor may decide to screen more often for some diseases, based on your risk and your co-morbidities (chronic disease you are already diagnosed with). Preventive services for you include:  - Medicare offers their members a free annual wellness visit, which is time for you and your primary care provider to discuss and plan for your preventive service needs. Take advantage of this benefit every year!  -All adults over the age of 72 should receive the recommended pneumonia vaccines. Current USPSTF guidelines recommend a series of two vaccines for the best pneumonia protection.   -All adults should have a flu vaccine yearly and a tetanus vaccine every 10 years.   -All adults age 48 and older should receive the shingles vaccines (series of two vaccines).       -All adults age 38-68 who are overweight should have a diabetes screening test once every three years.   -All adults born between [birth year] and 1965 should be screened once for Hepatitis C.  -Other screening tests and preventive services for persons with diabetes include: an eye exam to screen for diabetic retinopathy, a kidney function test, a foot exam, and stricter control over your cholesterol.   -Cardiovascular screening for adults with routine risk involves an electrocardiogram (ECG) at intervals determined by your doctor.   -Colorectal cancer screenings should be done for adults age 54-65 with no increased risk factors for colorectal cancer. There are a number of acceptable methods of screening for this type of cancer. Each test has its own benefits and drawbacks. Discuss with your doctor what is most appropriate for you during your annual wellness visit. The different tests include: colonoscopy (considered the best screening method), a fecal occult blood test, a fecal DNA test, and sigmoidoscopy.    -A bone mass density test is recommended when a woman turns 65 to screen for osteoporosis. This test is only recommended one time, as a screening. Some providers will use this same test as a disease monitoring tool if you already have osteoporosis. -Breast cancer screenings are recommended every other year for women of normal risk, age 54-69.  -Cervical cancer screenings for women over age 72 are only recommended with certain risk factors.      Here is a list of your current Health Maintenance items (your personalized list of preventive services) with a due date:  Health Maintenance Due   Topic Date Due    Eye Exam  01/16/2021    Albumin Urine Test  01/31/2021    Cholesterol Test   01/31/2021    Diabetic Foot Care  06/04/2021

## 2021-06-28 RX ORDER — DULAGLUTIDE 3 MG/.5ML
3 INJECTION, SOLUTION SUBCUTANEOUS
Qty: 4 ADJUSTABLE DOSE PRE-FILLED PEN SYRINGE | Refills: 0
Start: 2021-06-28

## 2021-07-02 DIAGNOSIS — Z76.0 MEDICATION REFILL: ICD-10-CM

## 2021-07-02 RX ORDER — ENALAPRIL MALEATE 20 MG/1
TABLET ORAL
Qty: 90 TABLET | Refills: 3 | Status: SHIPPED | OUTPATIENT
Start: 2021-07-02 | End: 2022-07-02

## 2021-07-19 DIAGNOSIS — G43.109 MIGRAINE AURA WITHOUT HEADACHE: ICD-10-CM

## 2021-07-19 RX ORDER — CEFUROXIME AXETIL 250 MG/1
TABLET ORAL
Qty: 3 KIT | Refills: 5 | Status: SHIPPED | OUTPATIENT
Start: 2021-07-19

## 2021-08-03 PROBLEM — I10 ESSENTIAL HYPERTENSION: Status: RESOLVED | Noted: 2017-07-18 | Resolved: 2021-08-03

## 2021-08-22 RX ORDER — LINACLOTIDE 290 UG/1
CAPSULE, GELATIN COATED ORAL
Qty: 30 CAPSULE | Refills: 5 | Status: SHIPPED | OUTPATIENT
Start: 2021-08-22 | End: 2021-12-06

## 2021-11-08 RX ORDER — OMEPRAZOLE 20 MG/1
CAPSULE, DELAYED RELEASE ORAL
Qty: 180 CAPSULE | Refills: 3 | Status: SHIPPED | OUTPATIENT
Start: 2021-11-08 | End: 2022-04-18

## 2021-11-29 ENCOUNTER — OFFICE VISIT (OUTPATIENT)
Dept: INTERNAL MEDICINE CLINIC | Age: 61
End: 2021-11-29
Payer: MEDICARE

## 2021-11-29 ENCOUNTER — HOSPITAL ENCOUNTER (OUTPATIENT)
Dept: LAB | Age: 61
Discharge: HOME | End: 2021-11-29
Payer: MEDICARE

## 2021-11-29 VITALS
HEART RATE: 93 BPM | OXYGEN SATURATION: 99 % | TEMPERATURE: 96.6 F | HEIGHT: 67 IN | BODY MASS INDEX: 41.15 KG/M2 | RESPIRATION RATE: 18 BRPM | WEIGHT: 262.2 LBS | SYSTOLIC BLOOD PRESSURE: 114 MMHG | DIASTOLIC BLOOD PRESSURE: 73 MMHG

## 2021-11-29 DIAGNOSIS — R10.9 FLANK PAIN: Primary | ICD-10-CM

## 2021-11-29 DIAGNOSIS — Z23 NEEDS FLU SHOT: ICD-10-CM

## 2021-11-29 DIAGNOSIS — R10.32 LLQ PAIN: ICD-10-CM

## 2021-11-29 DIAGNOSIS — R10.9 FLANK PAIN: ICD-10-CM

## 2021-11-29 LAB
ALBUMIN SERPL-MCNC: 3.8 G/DL (ref 3.4–5)
ALBUMIN/GLOB SERPL: 1.1 {RATIO} (ref 0.8–1.7)
ALP SERPL-CCNC: 60 U/L (ref 45–117)
ALT SERPL-CCNC: 25 U/L (ref 13–56)
ANION GAP SERPL CALC-SCNC: 4 MMOL/L (ref 3–18)
APPEARANCE UR: CLEAR
AST SERPL-CCNC: 16 U/L (ref 10–38)
BASOPHILS # BLD: 0 K/UL (ref 0–0.1)
BASOPHILS NFR BLD: 0 % (ref 0–2)
BILIRUB SERPL-MCNC: 0.3 MG/DL (ref 0.2–1)
BILIRUB UR QL: NEGATIVE
BUN SERPL-MCNC: 21 MG/DL (ref 7–18)
BUN/CREAT SERPL: 18 (ref 12–20)
CALCIUM SERPL-MCNC: 9.6 MG/DL (ref 8.5–10.1)
CHLORIDE SERPL-SCNC: 109 MMOL/L (ref 100–111)
CO2 SERPL-SCNC: 28 MMOL/L (ref 21–32)
COLOR UR: YELLOW
CREAT SERPL-MCNC: 1.16 MG/DL (ref 0.6–1.3)
DIFFERENTIAL METHOD BLD: ABNORMAL
EOSINOPHIL # BLD: 0.1 K/UL (ref 0–0.4)
EOSINOPHIL NFR BLD: 2 % (ref 0–5)
ERYTHROCYTE [DISTWIDTH] IN BLOOD BY AUTOMATED COUNT: 14.6 % (ref 11.6–14.5)
GLOBULIN SER CALC-MCNC: 3.6 G/DL (ref 2–4)
GLUCOSE SERPL-MCNC: 109 MG/DL (ref 74–99)
GLUCOSE UR STRIP.AUTO-MCNC: NEGATIVE MG/DL
HCT VFR BLD AUTO: 35.1 % (ref 35–45)
HGB BLD-MCNC: 10.9 G/DL (ref 12–16)
HGB UR QL STRIP: NEGATIVE
IMM GRANULOCYTES # BLD AUTO: 0 K/UL (ref 0–0.04)
IMM GRANULOCYTES NFR BLD AUTO: 1 % (ref 0–0.5)
KETONES UR QL STRIP.AUTO: NEGATIVE MG/DL
LEUKOCYTE ESTERASE UR QL STRIP.AUTO: NEGATIVE
LYMPHOCYTES # BLD: 4 K/UL (ref 0.9–3.6)
LYMPHOCYTES NFR BLD: 47 % (ref 21–52)
MCH RBC QN AUTO: 26.7 PG (ref 24–34)
MCHC RBC AUTO-ENTMCNC: 31.1 G/DL (ref 31–37)
MCV RBC AUTO: 86 FL (ref 78–100)
MONOCYTES # BLD: 0.5 K/UL (ref 0.05–1.2)
MONOCYTES NFR BLD: 6 % (ref 3–10)
NEUTS SEG # BLD: 3.8 K/UL (ref 1.8–8)
NEUTS SEG NFR BLD: 44 % (ref 40–73)
NITRITE UR QL STRIP.AUTO: NEGATIVE
NRBC # BLD: 0 K/UL (ref 0–0.01)
NRBC BLD-RTO: 0 PER 100 WBC
PH UR STRIP: 5.5 [PH] (ref 5–8)
PLATELET # BLD AUTO: 361 K/UL (ref 135–420)
PMV BLD AUTO: 9.9 FL (ref 9.2–11.8)
POTASSIUM SERPL-SCNC: 4.4 MMOL/L (ref 3.5–5.5)
PROT SERPL-MCNC: 7.4 G/DL (ref 6.4–8.2)
PROT UR STRIP-MCNC: NEGATIVE MG/DL
RBC # BLD AUTO: 4.08 M/UL (ref 4.2–5.3)
SODIUM SERPL-SCNC: 141 MMOL/L (ref 136–145)
SP GR UR REFRACTOMETRY: 1.02 (ref 1–1.03)
UROBILINOGEN UR QL STRIP.AUTO: 0.2 EU/DL (ref 0.2–1)
WBC # BLD AUTO: 8.5 K/UL (ref 4.6–13.2)

## 2021-11-29 PROCEDURE — G9717 DOC PT DX DEP/BP F/U NT REQ: HCPCS | Performed by: INTERNAL MEDICINE

## 2021-11-29 PROCEDURE — G8417 CALC BMI ABV UP PARAM F/U: HCPCS | Performed by: INTERNAL MEDICINE

## 2021-11-29 PROCEDURE — G9899 SCRN MAM PERF RSLTS DOC: HCPCS | Performed by: INTERNAL MEDICINE

## 2021-11-29 PROCEDURE — 99214 OFFICE O/P EST MOD 30 MIN: CPT | Performed by: INTERNAL MEDICINE

## 2021-11-29 PROCEDURE — 80053 COMPREHEN METABOLIC PANEL: CPT

## 2021-11-29 PROCEDURE — G8752 SYS BP LESS 140: HCPCS | Performed by: INTERNAL MEDICINE

## 2021-11-29 PROCEDURE — G8427 DOCREV CUR MEDS BY ELIG CLIN: HCPCS | Performed by: INTERNAL MEDICINE

## 2021-11-29 PROCEDURE — 3017F COLORECTAL CA SCREEN DOC REV: CPT | Performed by: INTERNAL MEDICINE

## 2021-11-29 PROCEDURE — 85025 COMPLETE CBC W/AUTO DIFF WBC: CPT

## 2021-11-29 PROCEDURE — G0008 ADMIN INFLUENZA VIRUS VAC: HCPCS | Performed by: INTERNAL MEDICINE

## 2021-11-29 PROCEDURE — 36415 COLL VENOUS BLD VENIPUNCTURE: CPT

## 2021-11-29 PROCEDURE — 81003 URINALYSIS AUTO W/O SCOPE: CPT

## 2021-11-29 PROCEDURE — 87086 URINE CULTURE/COLONY COUNT: CPT

## 2021-11-29 PROCEDURE — 90686 IIV4 VACC NO PRSV 0.5 ML IM: CPT | Performed by: INTERNAL MEDICINE

## 2021-11-29 PROCEDURE — G8754 DIAS BP LESS 90: HCPCS | Performed by: INTERNAL MEDICINE

## 2021-11-29 RX ORDER — DICYCLOMINE HYDROCHLORIDE 10 MG/1
10 CAPSULE ORAL
Qty: 12 CAPSULE | Refills: 0 | Status: SHIPPED | OUTPATIENT
Start: 2021-11-29

## 2021-11-29 NOTE — PROGRESS NOTES
HISTORY OF PRESENT ILLNESS  Gris Tyler is a 62 y.o. female. /73 (BP 1 Location: Left upper arm, BP Patient Position: Sitting, BP Cuff Size: Large adult)   Pulse 93   Temp (!) 96.6 °F (35.9 °C) (Temporal)   Resp 18   Ht 5' 7\" (1.702 m)   Wt 262 lb 3.2 oz (118.9 kg)   SpO2 99%   BMI 41.07 kg/m²         Here for left flank pain. In 2016 for similar sxs U/S was negative    Just had a proteus UTI infection 11/9/21.  11/17/21 urine was clear. Is followed by urologist, Dr. Martinez Hyde and will be having a procedure in early December. She had a bladder botox injection a week ago. The pain may have gotten a little worse then. Abdominal Pain  The history is provided by the patient (left flank/upper mid lateral abdominal pain. ). This is a new problem. Associated symptoms include abdominal pain. Pertinent negatives include no chest pain and no shortness of breath. Review of Systems   Constitutional: Negative for chills and fever. Respiratory: Negative for shortness of breath. Cardiovascular: Negative for chest pain. Gastrointestinal: Positive for abdominal pain. Bowels are fairly well regulated now on current meds. No nausea or vomiting or fever or chills  Lying down makes it worse       Physical Exam  Vitals and nursing note reviewed. Constitutional:       Appearance: Normal appearance. She is well-developed. HENT:      Head: Normocephalic and atraumatic. Cardiovascular:      Rate and Rhythm: Normal rate and regular rhythm. Pulmonary:      Effort: Pulmonary effort is normal.      Breath sounds: Normal breath sounds. Abdominal:      General: Abdomen is flat. Tenderness: There is abdominal tenderness. There is no guarding or rebound. Skin:     General: Skin is warm and dry. Neurological:      General: No focal deficit present. Mental Status: She is alert and oriented to person, place, and time.    Psychiatric:         Mood and Affect: Mood normal. Behavior: Behavior normal.         ASSESSMENT and PLAN    ICD-10-CM ICD-9-CM    1. Flank pain  R10.9 789.09 URINALYSIS W/ RFLX MICROSCOPIC      CULTURE, URINE      CT ABD PELV W WO CONT      METABOLIC PANEL, COMPREHENSIVE      CBC WITH AUTOMATED DIFF      dicyclomine (BENTYL) 10 mg capsule   2. LLQ pain  R10.32 789.04 CT ABD PELV W WO CONT      METABOLIC PANEL, COMPREHENSIVE      CBC WITH AUTOMATED DIFF      dicyclomine (BENTYL) 10 mg capsule   3. Needs flu shot  Z23 V04.81 INFLUENZA VIRUS VAC QUAD,SPLIT,PRESV FREE SYRINGE IM       Flank/side pain--will request CT scan  Recent UTI  Suspect this is most likely musculoskeletal, but given her h/o bowel problems/constipation must consider possible stool burden in the splenic flexure. Lab today as noted    Recheck urine. She is followed by urology for overactive bladder and has begun botox     Aim for a good BM also  Trial bentyl--though advised may make constipation worse.     F/u as appointed in December

## 2021-11-29 NOTE — PROGRESS NOTES
Reviewed record in preparation for visit and have obtained necessary documentation. Art Veal is a 62 y.o.  female presents today for office visit for   Chief Complaint   Patient presents with    Abdominal Pain     left side, 1 w   . Pt is not fasting. Patient is accompanied by self. I have received verbal consent from Art Vela to discuss any/all medical information while they are present in the room. [facility] preferred language for health care discussion is english/other. Is the patient using any DME equipment during OV? NO    Advance Directive:  1. Do you have an advance directive in place? Patient Reply: NO    2. If not, would you like material regarding how to put one in place? NO      1. \"Have you been to the ER, urgent care clinic since your last visit? Hospitalized since your last visit? \" No    2. \"Have you seen or consulted any other health care providers outside of the [address] [place] [address] since your last visit? \" No     3. For patients aged 39-70: Has the patient had a colonoscopy? Yes, HM satisfied with blue hyperlink     If the patient is female:    4. For patients aged 41-77: Has the patient had a mammogram within the past 2 years? Yes, HM satisfied with blue hyperlink    5. For patients aged 21-65: Has the patient had a pap smear? No    Upcoming Appts  No    VORB:   Orders Placed This Encounter    CULTURE, URINE    CT ABD PELV W WO CONT    URINALYSIS W/ RFLX MICROSCOPIC   /Saritha Benson MD/Ninoska Manjarrez LPN    Art Vela is due for:   Health Maintenance Due   Topic    Cervical cancer screen     Eye Exam Retinal or Dilated     Flu Vaccine (1)     Health Maintenance reviewed and discussed per provider  Please order/place referral if appropriate.     Requested Prescriptions      No prescriptions requested or ordered in this encounter       Learning Assessment:  Learning Assessment 11/24/2014   PRIMARY LEARNER Patient   HIGHEST LEVEL OF EDUCATION - PRIMARY LEARNER  2 YEARS OF COLLEGE   BARRIERS PRIMARY LEARNER NONE   CO-LEARNER CAREGIVER No   PRIMARY LANGUAGE ENGLISH   LEARNER PREFERENCE PRIMARY DEMONSTRATION   ANSWERED BY self   RELATIONSHIP SELF     Depression Screening:  3 most recent [facility] Screens 6/10/2021 6/4/2020 10/18/2018 10/8/2018 9/25/2018 12/14/2017 7/18/2017   PHQ Not Done Active Diagnosis of Depression or Bipolar Disorder - - - - - Active Diagnosis of Depression or Bipolar Disorder   Little interest or pleasure in doing things - More than half the days Not at all Not at all Not at all Not at all Not at all   Feeling down, depressed, irritable, or hopeless - Nearly every day Not at all Not at all Several days Not at all Not at all   Total Score PHQ 2 - 5 0 0 1 0 0     Abuse Screening:  Abuse Screening Questionnaire 6/10/2021   Do you ever feel afraid of your partner? N   Are you in a relationship with someone who physically or mentally threatens you? N   Is it safe for you to go home? Y     Fall Risk  Fall Risk Assessment, last 12 mths 6/10/2021   Able to walk? Yes   Fall in past 12 months? 0   Do you feel unsteady? 0   Are you worried about falling 0     Recent Travel Screening and Travel History documentation     Travel Screening     Question   Response    In the last month, have you been in contact with someone who was confirmed or suspected to have Coronavirus / COVID-19? No / Unsure    Have you had a COVID-19 viral test in the last 14 days? No    Do you have any of the following new or worsening symptoms? Abdominal pain    Have you traveled internationally or domestically in the last month? No      Travel History   Travel since 10/29/21    No documented travel since 10/29/21         Fransico Eugene is a 62 y.o. female who presents for influenza immunization. she denies any symptoms , reactions or allergies that would exclude them from being immunized today.  Risks and adverse reactions were discussed and the VIS was given to them. All questions were addressed. she was observed for 15 min post injection. There were no reactions observed.     Mike Khoury LPN

## 2021-12-01 LAB
BACTERIA SPEC CULT: NORMAL
SERVICE CMNT-IMP: NORMAL

## 2021-12-09 ENCOUNTER — OFFICE VISIT (OUTPATIENT)
Dept: INTERNAL MEDICINE CLINIC | Age: 61
End: 2021-12-09
Payer: MEDICARE

## 2021-12-09 VITALS
TEMPERATURE: 97.4 F | HEIGHT: 67 IN | HEART RATE: 109 BPM | BODY MASS INDEX: 40.72 KG/M2 | OXYGEN SATURATION: 98 % | SYSTOLIC BLOOD PRESSURE: 113 MMHG | RESPIRATION RATE: 18 BRPM | DIASTOLIC BLOOD PRESSURE: 73 MMHG

## 2021-12-09 DIAGNOSIS — I10 ESSENTIAL HYPERTENSION: Primary | ICD-10-CM

## 2021-12-09 DIAGNOSIS — R10.12 LEFT UPPER QUADRANT ABDOMINAL PAIN: ICD-10-CM

## 2021-12-09 DIAGNOSIS — E11.21 TYPE 2 DIABETES MELLITUS WITH NEPHROPATHY (HCC): ICD-10-CM

## 2021-12-09 PROCEDURE — G8427 DOCREV CUR MEDS BY ELIG CLIN: HCPCS | Performed by: INTERNAL MEDICINE

## 2021-12-09 PROCEDURE — G9717 DOC PT DX DEP/BP F/U NT REQ: HCPCS | Performed by: INTERNAL MEDICINE

## 2021-12-09 PROCEDURE — 3017F COLORECTAL CA SCREEN DOC REV: CPT | Performed by: INTERNAL MEDICINE

## 2021-12-09 PROCEDURE — G8752 SYS BP LESS 140: HCPCS | Performed by: INTERNAL MEDICINE

## 2021-12-09 PROCEDURE — G8417 CALC BMI ABV UP PARAM F/U: HCPCS | Performed by: INTERNAL MEDICINE

## 2021-12-09 PROCEDURE — G8754 DIAS BP LESS 90: HCPCS | Performed by: INTERNAL MEDICINE

## 2021-12-09 PROCEDURE — 99213 OFFICE O/P EST LOW 20 MIN: CPT | Performed by: INTERNAL MEDICINE

## 2021-12-09 PROCEDURE — G9899 SCRN MAM PERF RSLTS DOC: HCPCS | Performed by: INTERNAL MEDICINE

## 2021-12-09 PROCEDURE — 3044F HG A1C LEVEL LT 7.0%: CPT | Performed by: INTERNAL MEDICINE

## 2021-12-09 PROCEDURE — 2022F DILAT RTA XM EVC RTNOPTHY: CPT | Performed by: INTERNAL MEDICINE

## 2021-12-09 NOTE — PROGRESS NOTES
Reviewed record in preparation for visit and have obtained necessary documentation. Jhonny Wong is a 62 y.o.  female presents today for office visit for   Chief Complaint   Patient presents with    Hypertension    Diabetes    Cholesterol Problem   . Pt is not fasting. Patient is accompanied by self. I have received verbal consent from Jhonny Wong to discuss any/all medical information while they are present in the room. Pt is in Room # [address] preferred language for health care discussion is english/other. Is the patient using any DME equipment during OV? NO    Advance Directive:  1. Do you have an advance directive in place? Patient Reply: NO    2. If not, would you like material regarding how to put one in place? NO      1. \"Have you been to the ER, urgent care clinic since your last visit? Hospitalized since your last visit? \" No    2. \"Have you seen or consulted any other health care providers outside of the [address] [place] [address] since your last visit? \" No     3. For patients aged 39-70: Has the patient had a colonoscopy? Yes, HM satisfied with blue hyperlink     If the patient is female:    4. For patients aged 41-77: Has the patient had a mammogram within the past 2 years? Yes, HM satisfied with blue hyperlink    5. For patients aged 21-65: Has the patient had a pap smear? No    Upcoming Appts  No    VORB: No orders of the defined types were placed in this encounter.  Gayle Wilcox MD/Ninoska Camilo LPN    Jhonny Wong is due for:   Health Maintenance Due   Topic    Cervical cancer screen     Eye Exam Retinal or Dilated     COVID-19 Vaccine (3 - Booster for Moderna series)     Health Maintenance reviewed and discussed per provider  Please order/place referral if appropriate.     Requested Prescriptions      No prescriptions requested or ordered in this encounter       Learning Assessment:  Learning Assessment 11/24/2014   PRIMARY LEARNER Patient   HIGHEST LEVEL OF EDUCATION - PRIMARY LEARNER  2 YEARS OF COLLEGE   BARRIERS PRIMARY LEARNER NONE   CO-LEARNER CAREGIVER No   PRIMARY LANGUAGE ENGLISH   LEARNER PREFERENCE PRIMARY DEMONSTRATION   ANSWERED BY self   RELATIONSHIP SELF     Depression Screening:  3 most recent [facility] Screens 6/10/2021 6/4/2020 10/18/2018 10/8/2018 9/25/2018 12/14/2017 7/18/2017   PHQ Not Done Active Diagnosis of Depression or Bipolar Disorder - - - - - Active Diagnosis of Depression or Bipolar Disorder   Little interest or pleasure in doing things - More than half the days Not at all Not at all Not at all Not at all Not at all   Feeling down, depressed, irritable, or hopeless - Nearly every day Not at all Not at all Several days Not at all Not at all   Total Score PHQ 2 - 5 0 0 1 0 0     Abuse Screening:  Abuse Screening Questionnaire 6/10/2021   Do you ever feel afraid of your partner? N   Are you in a relationship with someone who physically or mentally threatens you? N   Is it safe for you to go home? Y     Fall Risk  Fall Risk Assessment, last 12 mths 6/10/2021   Able to walk? Yes   Fall in past 12 months? 0   Do you feel unsteady?  0   Are you worried about falling 0     Recent Travel Screening and Travel History documentation     Travel Screening     No screening recorded since 12/08/21 0000     Travel History   Travel since 11/09/21    No documented travel since 11/09/21

## 2021-12-09 NOTE — PROGRESS NOTES
HISTORY OF PRESENT ILLNESS  Allison Sandoval is a 62 y.o. female. /73 (BP 1 Location: Right arm, BP Patient Position: Sitting, BP Cuff Size: Large adult)   Pulse (!) 109   Temp 97.4 °F (36.3 °C) (Temporal)   Resp 18   Ht 5' 7\" (1.702 m)   SpO2 98%   BMI 40.72 kg/m²         Sees endocrinology. Last seen there in August  Her blood sugar has been great. In may her HBA1c was 6.5%        Also says her left side abdominal sxs are better since last visit. But CT has not been scheduled via [facility] due to staffing problems                Current Outpatient Medications:     lubiPROStone (Amitiza) 8 mcg capsule, Amitiza 8 mcg capsule  take 1 capsule by mouth once daily WITH SUPPER, Disp: , Rfl:     dicyclomine (BENTYL) 10 mg capsule, Take 1 Capsule by mouth three (3) times daily as needed for Abdominal Cramps. Indications: bowel spasm, Disp: 12 Capsule, Rfl: 0    omeprazole (PRILOSEC) 20 mg capsule, take 1 capsule by mouth twice a day, Disp: 180 Capsule, Rfl: 3    SUMAtriptan succinate 6 mg/0.5 mL kit, inject 1 dose (6 MG) subcutaneously for migraines, Disp: 3 Kit, Rfl: 5    enalapril (VASOTEC) 20 mg tablet, take 1 tablet by mouth once daily, Disp: 90 Tablet, Rfl: 3    dulaglutide (Trulicity) 3 DP/4.0 mL pnij, 3 mg by SubCUTAneous route every seven (7) days. Indications: type 2 diabetes mellitus, Disp: 4 Adjustable Dose Pre-filled Pen Syringe, Rfl: 0    metFORMIN (GLUCOPHAGE) 500 mg tablet, Take 1 Tablet by mouth daily (with lunch). , Disp: , Rfl:     tiZANidine (ZANAFLEX) 4 mg tablet, Take 1 Tab by mouth nightly as needed for Muscle Spasm(s). Indications: muscle spasm, Disp: 30 Tab, Rfl: 2    baclofen (LIORESAL) 10 mg tablet, take 1 tablet by mouth three times a day, Disp: , Rfl:     polyethylene glycol (MIRALAX) 17 gram/dose powder, take 17GM (DISSOLVED IN WATER) by mouth once daily, Disp: , Rfl:     estradioL (Estrace) 0.01 % (0.1 mg/gram) vaginal cream, INSERT 2 GRAM INTO VAGINA DAILY.  APPLY FINGERTIP AMOUNT TO OUTSIDE OF VAGINAL OPENING, Disp: 42.5 g, Rfl: 11    lidocaine (LIDODERM) 5 %, Apply patch to the affected area for 12 hours a day and remove for 12 hours a day., Disp: 30 Each, Rfl: 5    Nyamyc powder, APPLY A THIN LAYER UNDER BREASTS TWICE A DAY AS NEEDED FOR FLARE, Disp: 60 g, Rfl: 11    cholecalciferol (VITAMIN D3) 25 mcg (1,000 unit) cap, Vitamin D3 25 mcg (1,000 unit) capsule  Take by oral route., Disp: , Rfl:     DULoxetine (CYMBALTA) 30 mg capsule, , Disp: , Rfl:     camphor-menthol (SARNA ORIGINAL) 0.5-0.5 % lotion, Apply  to affected area as needed for Itching., Disp: , Rfl:     diclofenac (VOLTAREN) 1 % gel, Apply 2 g to affected area four (4) times daily. , Disp: 100 g, Rfl: 5    triamcinolone acetonide (KENALOG) 0.025 % topical cream, Apply  to affected area two (2) times a day. use thin layer, Disp: 15 g, Rfl: 5    acetaminophen (TYLENOL EXTRA STRENGTH) 500 mg tablet, Take 1 Tab by mouth every six (6) hours as needed for Pain., Disp: 30 Tab, Rfl: 0    cyanocobalamin 1,000 mcg tablet, Take 1 Tab by Mouth Once a Day. , Disp: , Rfl:     Insulin Needles, Disposable, (BD ULTRA-FINE MINI PEN NEEDLE) 31 gauge x 3/16\" ndle, BD Ultra-Fine Mini Pen Needle 31 gauge x 3/16\", Disp: , Rfl:     lancets (ONETOUCH DELICA LANCETS) 30 gauge misc, OneTouch Delica Lancets 30 gauge, Disp: , Rfl:     EVELIA PEN NEEDLE 32 gauge x 5/32\" ndle, inject once daily, Disp: , Rfl: 0    carvedilol (COREG) 12.5 mg tablet, take 1 tablet by mouth twice a day, Disp: , Rfl: 0    DULoxetine (CYMBALTA) 60 mg capsule, 90 mg daily. , Disp: , Rfl: 0    hydrOXYzine (ATARAX) 50 mg tablet, take 1 tablet by mouth every 12 hours, Disp: , Rfl: 0    multivitamin (ONE A DAY) tablet, Take 1 Tab by mouth daily. , Disp: , Rfl:     spironolactone (ALDACTONE) 25 mg tablet, Take 25 mg by mouth daily. , Disp: , Rfl:     coenzyme q10-vitamin e (COQ10 ) 100-100 mg-unit cap, Take  by mouth two (2) times a day., Disp: , Rfl:   clonazepam (KLONOPIN) 0.5 mg tablet, Take  by mouth two (2) times a day., Disp: , Rfl:     rosuvastatin (CRESTOR) 40 mg tablet, Take 40 mg by mouth daily. NON FORMULARY , Disp: , Rfl:     fenofibrate micronized (LOFIBRA) 134 mg capsule, Take  by mouth every morning., Disp: , Rfl:     aspirin 81 mg chewable tablet, Take 81 mg by mouth daily. , Disp: , Rfl:     CALCIUM CARBONATE/VITAMIN D3 (CALCIUM 600 + D,3, PO), Take 1 Cap by mouth daily. , Disp: , Rfl:     ferrous sulfate (IRON) 325 mg (65 mg iron) EC tablet, Take 1 Tablet by mouth two (2) times a day. (Patient not taking: Reported on 11/29/2021), Disp: , Rfl:       Hypertension   The history is provided by the patient. This is a chronic problem. The current episode started more than 1 week ago. The problem has not changed since onset. Pertinent negatives include no chest pain, no palpitations, no headaches, no dizziness and no shortness of breath. There are no associated agents to hypertension. Risk factors include dyslipidemia and diabetes mellitus. Diabetes  The history is provided by the patient. This is a chronic problem. The current episode started more than 1 week ago. The problem occurs daily. Associated symptoms include abdominal pain. Pertinent negatives include no chest pain, no headaches and no shortness of breath. Cholesterol Problem  The history is provided by the patient. This is a chronic problem. Associated symptoms include abdominal pain. Pertinent negatives include no chest pain, no headaches and no shortness of breath. Review of Systems   Constitutional: Negative for chills and fever. Respiratory: Negative for cough and shortness of breath. Cardiovascular: Negative for chest pain and palpitations. Gastrointestinal: Positive for abdominal pain. Neurological: Negative for dizziness and headaches. Physical Exam  Vitals and nursing note reviewed. Constitutional:       Appearance: Normal appearance.  She is well-developed. HENT:      Head: Normocephalic and atraumatic. Cardiovascular:      Rate and Rhythm: Normal rate and regular rhythm. Pulmonary:      Effort: Pulmonary effort is normal.      Breath sounds: Normal breath sounds. Abdominal:      Palpations: Abdomen is soft. Tenderness: There is no abdominal tenderness. Skin:     General: Skin is warm and dry. Neurological:      General: No focal deficit present. Mental Status: She is alert and oriented to person, place, and time. Psychiatric:         Mood and Affect: Mood normal.         Behavior: Behavior normal.         ASSESSMENT and PLAN    ICD-10-CM ICD-9-CM    1. Essential hypertension  I10 401.9    2. Type 2 diabetes mellitus with nephropathy (HCC)  E11.21 250.40      583.81    3. Left upper quadrant abdominal pain  R10.12 789.02        BP controlled  DM has been controlled. Goes to [facility]    Discussed BMI/weight, lifestyle, diet and exercise. Discussed effect on blood pressure, blood sugar, and joints especially  Focus on limiting white carbs, portion control, and moving more. Persistent abdominal pain--CT still pending.  Pt to call scheduling herself at [facility]    F/u 6 months for MWV, HTN, DM

## 2021-12-30 ENCOUNTER — TELEPHONE (OUTPATIENT)
Dept: INTERNAL MEDICINE CLINIC | Age: 61
End: 2021-12-30

## 2022-01-01 NOTE — TELEPHONE ENCOUNTER
FROM [facility]:        [address]  Outside Information     CT ABDOMEN/PELVIS W/ CONTRAST    Anatomical Region Laterality Modality   Abdomen -- Computed Tomography   Pelvis -- --       Impression  Performed by RADIOLOGY    1. No acute findings. 2. Colon diverticulosis. 3. Small hiatal hernia. 4. Fibroid uterus. 5. Small hiatal hernia. Signed By: Roshan Whitmore MD on 12/29/2021 2:11 PM    Narrative  Performed by RADIOLOGY    EXAM: CT ABDOMEN/PELVIS W/ CONTRAST     CLINICAL INDICATION/HISTORY : R10.9: Unspecified abdominal pain   R10.32: Left lower quadrant pain. COMPARISON: 10/8/2018     TECHNIQUE: Helical scan of the abdomen and pelvis was obtained  from the diaphragm to the symphysis with oral and with uneventful IV contrast administration. All CT scans at this facility are performed using dose optimization technique as appropriate to a performed exam, to include automated exposure control, adjustment of the MA and/or kV according to patient size (including appropriate matching for site specific examinations), or use of iterative reconstruction technique. FINDINGS:   Lung Bases:  Free of active disease. Calcified subcarinal lymph node     Liver: Unremarkable     Gallbladder:  Cholecystectomy   Biliary: Unremarkable     Spleen: Unremarkable     Adrenals:  Unremarkable     Pancreas: Unremarkable     Kidneys: Small exophytic cyst anterior mid right kidney similar to prior. Stomach, small bowel, colon:  Colon diverticulosis  Normal appendix. Small hiatal hernia     Lymph nodes:  No adenopathy     Vasculature/Aorta:  Unremarkable     Peritoneal spaces:  No ascites or free air. Bladder:  Bladder is not well-distended and not well evaluated but appears unremarkable as seen. Pelvic structures: Calcified uterine fibroids     Bones:  Lower lumbar spine spondylosis. Mild bilateral sacroiliac joint sclerosis.      Procedure Note

## 2022-01-03 ENCOUNTER — PATIENT MESSAGE (OUTPATIENT)
Dept: INTERNAL MEDICINE CLINIC | Age: 62
End: 2022-01-03

## 2022-01-04 NOTE — TELEPHONE ENCOUNTER
FROM [facility]:      [address]  Outside Information             CT ABDOMEN/PELVIS W/ CONTRAST    Anatomical Region Laterality Modality   Abdomen -- Computed Tomography   Pelvis -- --       Impression  Performed by RADIOLOGY    1. No acute findings. 2. Colon diverticulosis. 3. Small hiatal hernia. 4. Fibroid uterus. 5. Small hiatal hernia. Signed By: Alvino Lozada MD on 12/29/2021 2:11 PM    Narrative  Performed by RADIOLOGY    EXAM: CT ABDOMEN/PELVIS W/ CONTRAST     CLINICAL INDICATION/HISTORY : R10.9: Unspecified abdominal pain   R10.32: Left lower quadrant pain. COMPARISON: 10/8/2018     TECHNIQUE: Helical scan of the abdomen and pelvis was obtained  from the diaphragm to the symphysis with oral and with uneventful IV contrast administration. All CT scans at this facility are performed using dose optimization technique as appropriate to a performed exam, to include automated exposure control, adjustment of the MA and/or kV according to patient size (including appropriate matching for site specific examinations), or use of iterative reconstruction technique. FINDINGS:   Lung Bases:  Free of active disease. Calcified subcarinal lymph node     Liver: Unremarkable     Gallbladder:  Cholecystectomy   Biliary: Unremarkable     Spleen: Unremarkable     Adrenals:  Unremarkable     Pancreas: Unremarkable     Kidneys: Small exophytic cyst anterior mid right kidney similar to prior. Stomach, small bowel, colon:  Colon diverticulosis  Normal appendix. Small hiatal hernia     Lymph nodes:  No adenopathy     Vasculature/Aorta:  Unremarkable     Peritoneal spaces:  No ascites or free air. Bladder:  Bladder is not well-distended and not well evaluated but appears unremarkable as seen. Pelvic structures: Calcified uterine fibroids     Bones:  Lower lumbar spine spondylosis. Mild bilateral sacroiliac joint sclerosis.     Procedure Note    Sherri Guerrero MD - 12/29/2021   Formatting of this note might be different from the original.     EXAM: CT ABDOMEN/PELVIS W/ CONTRAST     CLINICAL INDICATION/HISTORY : R10.9: Unspecified abdominal pain   R10.32: Left lower quadrant pain. COMPARISON: 10/8/2018     TECHNIQUE: Helical scan of the abdomen and pelvis was obtained  from the diaphragm to the symphysis with oral and with uneventful IV contrast administration. All CT scans at this facility are performed using dose optimization technique as appropriate to a performed exam, to include automated exposure control, adjustment of the MA and/or kV according to patient size (including appropriate matching for site specific examinations), or use of iterative reconstruction technique. FINDINGS:   Lung Bases:  Free of active disease. Calcified subcarinal lymph node     Liver: Unremarkable     Gallbladder:  Cholecystectomy   Biliary: Unremarkable     Spleen: Unremarkable     Adrenals:  Unremarkable     Pancreas: Unremarkable     Kidneys: Small exophytic cyst anterior mid right kidney similar to prior. Stomach, small bowel, colon:  Colon diverticulosis  Normal appendix. Small hiatal hernia     Lymph nodes:  No adenopathy     Vasculature/Aorta:  Unremarkable     Peritoneal spaces:  No ascites or free air. Bladder:  Bladder is not well-distended and not well evaluated but appears unremarkable as seen. Pelvic structures: Calcified uterine fibroids     Bones:  Lower lumbar spine spondylosis. Mild bilateral sacroiliac joint sclerosis. IMPRESSION     1. No acute findings. 2. Colon diverticulosis. 3. Small hiatal hernia. 4. Fibroid uterus. 5. Small hiatal hernia.        Signed By: Agatha Guerrero MD on 12/29/2021 2:11 PM  Specimen Collected: 12/29/21  2:06 PM Last Resulted: 12/29/21  2:11 PM   Received From: [facility]  Result Received: 01/01/22  1:22 AM   View Encounter

## 2022-01-04 NOTE — TELEPHONE ENCOUNTER
Called pt and left message. Call back number left and I myself or one of the other nurses will attempt to contact again. The call was to inform pt of test results. From: Carolynn Alan  To: Virgilio Torres MD  Sent: 1/3/2022  9:49 PM EST  Subject: Results from Bettie Balderas my results are in from the CAT Scan. Please contact me to discuss the results.   Thanks, Clarissa Santiago

## 2022-01-07 ENCOUNTER — TELEPHONE (OUTPATIENT)
Dept: INTERNAL MEDICINE CLINIC | Age: 62
End: 2022-01-07

## 2022-01-07 NOTE — TELEPHONE ENCOUNTER
Spoke with pt in regards to CT results. Relayed the 's note. Pt inquires to what to do with the pain in her abdominal area. Pt states it is continuing. Will notify.

## 2022-01-07 NOTE — TELEPHONE ENCOUNTER
My best guess is that is it IBS with constipation features. Diverticulosis also makes her bowel more sensitive.  This is hard to treat and the mainstay is keeping bowels regular

## 2022-01-19 LAB — HBA1C MFR BLD HPLC: 6.4 %

## 2022-03-02 NOTE — TELEPHONE ENCOUNTER
Called pt and left message. Call back number left. The call was to inquire if the pt is continuing with periodic abdominal pain.

## 2022-03-13 NOTE — TELEPHONE ENCOUNTER
Please clarify with patient. Is she taking amitiza or linzess? Refill request is for Linzess, but I filled Amitiza in December.

## 2022-03-14 RX ORDER — LINACLOTIDE 290 UG/1
CAPSULE, GELATIN COATED ORAL
Qty: 30 CAPSULE | Refills: 5 | Status: SHIPPED | OUTPATIENT
Start: 2022-03-14

## 2022-03-14 NOTE — TELEPHONE ENCOUNTER
S/W the pt regarding to medication clarification. Pt states she has been taking the Linzess and not the Amitiza. Will notify.

## 2022-03-18 DIAGNOSIS — R10.9 FLANK PAIN: ICD-10-CM

## 2022-03-18 DIAGNOSIS — R10.32 LLQ PAIN: ICD-10-CM

## 2022-03-18 PROBLEM — E11.21 TYPE 2 DIABETES MELLITUS WITH NEPHROPATHY (HCC): Status: ACTIVE | Noted: 2017-12-14

## 2022-03-18 PROBLEM — E66.01 SEVERE OBESITY (BMI 35.0-39.9) WITH COMORBIDITY (HCC): Status: ACTIVE | Noted: 2018-04-05

## 2022-03-19 PROBLEM — E11.9 TYPE 2 DIABETES MELLITUS WITHOUT COMPLICATION, WITHOUT LONG-TERM CURRENT USE OF INSULIN (HCC): Status: ACTIVE | Noted: 2017-01-16

## 2022-03-19 PROBLEM — E78.5 DYSLIPIDEMIA: Status: ACTIVE | Noted: 2017-07-18

## 2022-03-19 PROBLEM — I73.9 PERIPHERAL VASCULAR DISEASE (HCC): Status: ACTIVE | Noted: 2020-11-18

## 2022-03-31 ENCOUNTER — DOCUMENTATION ONLY (OUTPATIENT)
Dept: INTERNAL MEDICINE CLINIC | Age: 62
End: 2022-03-31

## 2022-04-18 ENCOUNTER — HOSPITAL ENCOUNTER (OUTPATIENT)
Dept: LAB | Age: 62
Discharge: HOME | End: 2022-04-18
Payer: MEDICARE

## 2022-04-18 ENCOUNTER — OFFICE VISIT (OUTPATIENT)
Dept: INTERNAL MEDICINE CLINIC | Age: 62
End: 2022-04-18
Payer: MEDICARE

## 2022-04-18 VITALS
TEMPERATURE: 97.3 F | BODY MASS INDEX: 39.87 KG/M2 | HEART RATE: 85 BPM | HEIGHT: 67 IN | DIASTOLIC BLOOD PRESSURE: 68 MMHG | WEIGHT: 254 LBS | SYSTOLIC BLOOD PRESSURE: 107 MMHG | OXYGEN SATURATION: 98 % | RESPIRATION RATE: 18 BRPM

## 2022-04-18 DIAGNOSIS — E11.21 TYPE 2 DIABETES MELLITUS WITH NEPHROPATHY (HCC): ICD-10-CM

## 2022-04-18 DIAGNOSIS — R07.89 CHEST WALL PAIN: ICD-10-CM

## 2022-04-18 DIAGNOSIS — R82.90 ABNORMAL URINE ODOR: ICD-10-CM

## 2022-04-18 DIAGNOSIS — I73.9 PERIPHERAL VASCULAR DISEASE (HCC): ICD-10-CM

## 2022-04-18 DIAGNOSIS — R82.90 ABNORMAL URINE ODOR: Primary | ICD-10-CM

## 2022-04-18 DIAGNOSIS — E66.01 SEVERE OBESITY (BMI 35.0-39.9) WITH COMORBIDITY (HCC): ICD-10-CM

## 2022-04-18 DIAGNOSIS — R82.81 PYURIA: ICD-10-CM

## 2022-04-18 LAB
BILIRUB UR QL STRIP: NEGATIVE
GLUCOSE UR-MCNC: NEGATIVE MG/DL
KETONES P FAST UR STRIP-MCNC: NEGATIVE MG/DL
PH UR STRIP: 7 [PH] (ref 4.6–8)
PROT UR QL STRIP: NEGATIVE
SP GR UR STRIP: 1.02 (ref 1–1.03)
UA UROBILINOGEN AMB POC: NORMAL (ref 0.2–1)
URINALYSIS CLARITY POC: NORMAL
URINALYSIS COLOR POC: YELLOW
URINE BLOOD POC: NEGATIVE
URINE LEUKOCYTES POC: NEGATIVE
URINE NITRITES POC: POSITIVE

## 2022-04-18 PROCEDURE — G8427 DOCREV CUR MEDS BY ELIG CLIN: HCPCS | Performed by: INTERNAL MEDICINE

## 2022-04-18 PROCEDURE — G8754 DIAS BP LESS 90: HCPCS | Performed by: INTERNAL MEDICINE

## 2022-04-18 PROCEDURE — G8752 SYS BP LESS 140: HCPCS | Performed by: INTERNAL MEDICINE

## 2022-04-18 PROCEDURE — G8417 CALC BMI ABV UP PARAM F/U: HCPCS | Performed by: INTERNAL MEDICINE

## 2022-04-18 PROCEDURE — G9717 DOC PT DX DEP/BP F/U NT REQ: HCPCS | Performed by: INTERNAL MEDICINE

## 2022-04-18 PROCEDURE — 99213 OFFICE O/P EST LOW 20 MIN: CPT | Performed by: INTERNAL MEDICINE

## 2022-04-18 PROCEDURE — 2022F DILAT RTA XM EVC RTNOPTHY: CPT | Performed by: INTERNAL MEDICINE

## 2022-04-18 PROCEDURE — 87086 URINE CULTURE/COLONY COUNT: CPT

## 2022-04-18 PROCEDURE — 87186 SC STD MICRODIL/AGAR DIL: CPT

## 2022-04-18 PROCEDURE — 87077 CULTURE AEROBIC IDENTIFY: CPT

## 2022-04-18 PROCEDURE — G9899 SCRN MAM PERF RSLTS DOC: HCPCS | Performed by: INTERNAL MEDICINE

## 2022-04-18 PROCEDURE — 3017F COLORECTAL CA SCREEN DOC REV: CPT | Performed by: INTERNAL MEDICINE

## 2022-04-18 PROCEDURE — 3046F HEMOGLOBIN A1C LEVEL >9.0%: CPT | Performed by: INTERNAL MEDICINE

## 2022-04-18 PROCEDURE — 81001 URINALYSIS AUTO W/SCOPE: CPT | Performed by: INTERNAL MEDICINE

## 2022-04-18 RX ORDER — PANTOPRAZOLE SODIUM 40 MG/1
1 TABLET, DELAYED RELEASE ORAL
COMMUNITY
Start: 2022-02-25

## 2022-04-18 RX ORDER — NITROFURANTOIN 25; 75 MG/1; MG/1
100 CAPSULE ORAL 2 TIMES DAILY
Qty: 14 CAPSULE | Refills: 1 | Status: SHIPPED | OUTPATIENT
Start: 2022-04-18 | End: 2022-08-22 | Stop reason: ALTCHOICE

## 2022-04-18 NOTE — PROGRESS NOTES
HISTORY OF PRESENT ILLNESS  Julio Blanco is a 62 y.o. female. /68 (BP 1 Location: Right arm, BP Patient Position: Sitting, BP Cuff Size: Large adult)   Pulse 85   Temp 97.3 °F (36.3 °C) (Temporal)   Resp 18   Ht 5' 7\" (1.702 m)   Wt 254 lb (115.2 kg)   SpO2 98%   BMI 39.78 kg/m²     Asked the nurse for a check on her urine for odor and color change    Migraine   The history is provided by the patient (had a severe migraine last week. Since then she feels a pain behind her left breast.). This is a recurrent problem. The current episode started more than 1 week ago. Associated with: \"electrical shock\" sensation after a bad migraine. She has persistent left breast pain. Pertinent negatives include no fever, no palpitations and no shortness of breath. Urinary Odor  Pertinent negatives include no chest pain and no shortness of breath. Review of Systems   Constitutional: Negative for chills and fever. Respiratory: Negative for cough and shortness of breath. Cardiovascular: Negative for chest pain and palpitations. Physical Exam  Vitals and nursing note reviewed. Constitutional:       Appearance: Normal appearance. She is well-developed. Cardiovascular:      Rate and Rhythm: Normal rate and regular rhythm. Pulmonary:      Effort: Pulmonary effort is normal.      Breath sounds: Normal breath sounds. Abdominal:      Tenderness: There is no right CVA tenderness or left CVA tenderness. Skin:     General: Skin is warm and dry. Neurological:      General: No focal deficit present. Mental Status: She is alert and oriented to person, place, and time. Psychiatric:         Mood and Affect: Mood normal.         Behavior: Behavior normal.         ASSESSMENT and PLAN    ICD-10-CM ICD-9-CM    1. Abnormal urine odor  R82.90 791.9 AMB POC URINALYSIS DIP STICK AUTO W/ MICRO      CULTURE, URINE   2. Chest wall pain  R07.89 786.52    3. Severe obesity (BMI 35.0-39. 9) with comorbidity ([facility] [address]  E66.01 278.01    4. Type 2 diabetes mellitus with nephropathy (HCC)  E11.21 250.40      583.81    5. Peripheral vascular disease ([facility])  I73.9 443.9        Urine dip + nitrites  Will tx for UTI  She sees urology this week  She has had several UTIs in the past    Chest wall pain. Most likely cartilage injury. Reassurance this will pass but will take time  Heat. Cushioning when sleeping. Topical NSAID prn.  Tylenol prn    F/u as appointed June 9

## 2022-04-18 NOTE — PROGRESS NOTES
1. \"Have you been to the ER, urgent care clinic since your last visit? Hospitalized since your last visit? \" Yes Urgent Care 2022    2. \"Have you seen or consulted any other health care providers outside of the [address] [place] [address] since your last visit? \" Yes Well woman exam - [facility] 2022     3. For patients aged 39-70: Has the patient had a colonoscopy / FIT/ Cologuard? Yes - no Care Gap present      If the patient is female:    4. For patients aged 41-77: Has the patient had a mammogram within the past 2 years? Yes - no Care Gap present      5. For patients aged 21-65: Has the patient had a pap smear? Yes - Care Gap present.  Rooming MA/LPN to request most recent results

## 2022-04-23 LAB
BACTERIA SPEC CULT: ABNORMAL
CC UR VC: ABNORMAL
SERVICE CMNT-IMP: ABNORMAL

## 2022-04-24 RX ORDER — AMOXICILLIN 500 MG/1
500 CAPSULE ORAL 3 TIMES DAILY
Qty: 15 CAPSULE | Refills: 0 | Status: SHIPPED | OUTPATIENT
Start: 2022-04-24 | End: 2022-04-29

## 2022-06-09 ENCOUNTER — HOSPITAL ENCOUNTER (OUTPATIENT)
Dept: LAB | Age: 62
Discharge: HOME | End: 2022-06-09
Payer: MEDICARE

## 2022-06-09 ENCOUNTER — OFFICE VISIT (OUTPATIENT)
Dept: INTERNAL MEDICINE CLINIC | Age: 62
End: 2022-06-09
Payer: MEDICARE

## 2022-06-09 VITALS
SYSTOLIC BLOOD PRESSURE: 106 MMHG | HEART RATE: 90 BPM | WEIGHT: 245.4 LBS | RESPIRATION RATE: 16 BRPM | DIASTOLIC BLOOD PRESSURE: 72 MMHG | OXYGEN SATURATION: 98 % | BODY MASS INDEX: 38.52 KG/M2 | TEMPERATURE: 96.4 F | HEIGHT: 67 IN

## 2022-06-09 DIAGNOSIS — J31.0 OTHER RHINITIS: ICD-10-CM

## 2022-06-09 DIAGNOSIS — E11.21 TYPE 2 DIABETES MELLITUS WITH NEPHROPATHY (HCC): ICD-10-CM

## 2022-06-09 DIAGNOSIS — Z00.00 MEDICARE ANNUAL WELLNESS VISIT, SUBSEQUENT: Primary | ICD-10-CM

## 2022-06-09 DIAGNOSIS — I10 PRIMARY HYPERTENSION: ICD-10-CM

## 2022-06-09 DIAGNOSIS — N18.30 STAGE 3 CHRONIC KIDNEY DISEASE, UNSPECIFIED WHETHER STAGE 3A OR 3B CKD (HCC): ICD-10-CM

## 2022-06-09 LAB
CREAT UR-MCNC: 150 MG/DL (ref 30–125)
MICROALBUMIN UR-MCNC: 1.33 MG/DL (ref 0–3)
MICROALBUMIN/CREAT UR-RTO: 9 MG/G (ref 0–30)

## 2022-06-09 PROCEDURE — G8754 DIAS BP LESS 90: HCPCS | Performed by: INTERNAL MEDICINE

## 2022-06-09 PROCEDURE — G8752 SYS BP LESS 140: HCPCS | Performed by: INTERNAL MEDICINE

## 2022-06-09 PROCEDURE — 82043 UR ALBUMIN QUANTITATIVE: CPT

## 2022-06-09 PROCEDURE — G8417 CALC BMI ABV UP PARAM F/U: HCPCS | Performed by: INTERNAL MEDICINE

## 2022-06-09 PROCEDURE — 3017F COLORECTAL CA SCREEN DOC REV: CPT | Performed by: INTERNAL MEDICINE

## 2022-06-09 PROCEDURE — 2022F DILAT RTA XM EVC RTNOPTHY: CPT | Performed by: INTERNAL MEDICINE

## 2022-06-09 PROCEDURE — G9899 SCRN MAM PERF RSLTS DOC: HCPCS | Performed by: INTERNAL MEDICINE

## 2022-06-09 PROCEDURE — G0439 PPPS, SUBSEQ VISIT: HCPCS | Performed by: INTERNAL MEDICINE

## 2022-06-09 PROCEDURE — 3044F HG A1C LEVEL LT 7.0%: CPT | Performed by: INTERNAL MEDICINE

## 2022-06-09 PROCEDURE — G9717 DOC PT DX DEP/BP F/U NT REQ: HCPCS | Performed by: INTERNAL MEDICINE

## 2022-06-09 PROCEDURE — G8427 DOCREV CUR MEDS BY ELIG CLIN: HCPCS | Performed by: INTERNAL MEDICINE

## 2022-06-09 PROCEDURE — 99213 OFFICE O/P EST LOW 20 MIN: CPT | Performed by: INTERNAL MEDICINE

## 2022-06-09 RX ORDER — LEVOCETIRIZINE DIHYDROCHLORIDE 5 MG/1
5 TABLET, FILM COATED ORAL DAILY
Qty: 30 TABLET | Refills: 5 | Status: SHIPPED | OUTPATIENT
Start: 2022-06-09

## 2022-06-09 NOTE — PROGRESS NOTES
HISTORY OF PRESENT ILLNESS  Ousmane Uribe is a 62 y.o. female. /72 (BP 1 Location: Left upper arm, BP Patient Position: Sitting, BP Cuff Size: Large adult)   Pulse 90   Temp (!) 96.4 °F (35.8 °C) (Temporal)   Resp 16   Ht 5' 7\" (1.702 m)   Wt 245 lb 6.4 oz (111.3 kg)   SpO2 98%   BMI 38.44 kg/m²     Saw urology since last visit    Will f/u with GI  Next week        Hypertension   The history is provided by the patient. This is a chronic problem. The current episode started more than 1 week ago. Pertinent negatives include no chest pain, no palpitations and no shortness of breath. Risk factors include dyslipidemia and diabetes mellitus. Diabetes  The history is provided by the patient. This is a chronic problem. The current episode started more than 1 week ago. Pertinent negatives include no chest pain and no shortness of breath. Cholesterol Problem  The history is provided by the patient. This is a chronic problem. The current episode started more than 1 week ago. The problem occurs daily. Pertinent negatives include no chest pain and no shortness of breath. Review of Systems   Constitutional: Negative for chills and fever. HENT: Positive for congestion (and sinus drainage). Respiratory: Negative for cough and shortness of breath. Cardiovascular: Negative for chest pain and palpitations. Physical Exam  Vitals and nursing note reviewed. Constitutional:       Appearance: Normal appearance. She is well-developed. HENT:      Head: Normocephalic and atraumatic. Right Ear: Tympanic membrane and ear canal normal.      Left Ear: Tympanic membrane and ear canal normal.      Nose: Congestion and rhinorrhea present. Comments: Deviated septum to right     Mouth/Throat:      Mouth: Mucous membranes are moist.   Eyes:      Conjunctiva/sclera: Conjunctivae normal.   Cardiovascular:      Rate and Rhythm: Normal rate and regular rhythm.    Pulmonary:      Effort: Pulmonary effort is normal.      Breath sounds: Normal breath sounds. Musculoskeletal:      Right lower leg: No edema. Left lower leg: No edema. Skin:     General: Skin is warm and dry. Neurological:      General: No focal deficit present. Mental Status: She is alert and oriented to person, place, and time. Comments: Diabetic foot exam:     Left Foot:   Visual Exam: normal    Pulse DP: 2+ (normal)   Filament test: normal sensation    Vibratory sensation: diminished      Right Foot:   Visual Exam: normal    Pulse DP: 2+ (normal)   Filament test: normal sensation    Vibratory sensation: diminished     Psychiatric:         Mood and Affect: Mood normal.         Behavior: Behavior normal.         ASSESSMENT and PLAN    ICD-10-CM ICD-9-CM           2. Type 2 diabetes mellitus with nephropathy (HCC)  E11.21 250.40  DIABETES FOOT EXAM     583.81 MICROALBUMIN, UR, RAND W/ MICROALB/CREAT RATIO   3. Primary hypertension  I10 401.9    4. Other rhinitis  J31.0 472.0 levocetirizine (XYZAL) 5 mg tablet   5. Stage 3 chronic kidney disease, unspecified whether stage 3a or 3b CKD ([facility] [address]  N18.30 585. 3            BP looks great as usual    DM has been controlled. Is managed at [facility]--she will see them next month. She will forward lab from them next month  Will get urine here today    Will add xyzal for rhinitis. She asked for tussionex, but there is no indication for this.   She had been seen at an urgent care recently and given an antibiotic because she had other sxs including some fevers    F/u 6 months for HTN, DM, chol

## 2022-06-09 NOTE — PATIENT INSTRUCTIONS
Medicare Wellness Visit, Female     The best way to live healthy is to have a lifestyle where you eat a well-balanced diet, exercise regularly, limit alcohol use, and quit all forms of tobacco/nicotine, if applicable. Regular preventive services are another way to keep healthy. Preventive services (vaccines, screening tests, monitoring & exams) can help personalize your care plan, which helps you manage your own care. Screening tests can find health problems at the earliest stages, when they are easiest to treat. [facility] follows the current, evidence-based guidelines published by the [facility] [employer] ([facility]) when recommending preventive services for our patients. Because we follow these guidelines, sometimes recommendations change over time as research supports it. (For example, mammograms used to be recommended annually. Even though Medicare will still pay for an annual mammogram, the newer guidelines recommend a mammogram every two years for women of average risk). Of course, you and your doctor may decide to screen more often for some diseases, based on your risk and your co-morbidities (chronic disease you are already diagnosed with). Preventive services for you include:  - Medicare offers their members a free annual wellness visit, which is time for you and your primary care provider to discuss and plan for your preventive service needs. Take advantage of this benefit every year!  -All adults over the age of 72 should receive the recommended pneumonia vaccines. Current USPSTF guidelines recommend a series of two vaccines for the best pneumonia protection.   -All adults should have a flu vaccine yearly and a tetanus vaccine every 10 years.   -All adults age 48 and older should receive the shingles vaccines (series of two vaccines).       -All adults age 38-68 who are overweight should have a diabetes screening test once every three years.   -All adults born between [birth year] and 1964 should be screened once for Hepatitis C.  -Other screening tests and preventive services for persons with diabetes include: an eye exam to screen for diabetic retinopathy, a kidney function test, a foot exam, and stricter control over your cholesterol.   -Cardiovascular screening for adults with routine risk involves an electrocardiogram (ECG) at intervals determined by your doctor.   -Colorectal cancer screenings should be done for adults age 54-65 with no increased risk factors for colorectal cancer. There are a number of acceptable methods of screening for this type of cancer. Each test has its own benefits and drawbacks. Discuss with your doctor what is most appropriate for you during your annual wellness visit. The different tests include: colonoscopy (considered the best screening method), a fecal occult blood test, a fecal DNA test, and sigmoidoscopy.    -A bone mass density test is recommended when a woman turns 65 to screen for osteoporosis. This test is only recommended one time, as a screening. Some providers will use this same test as a disease monitoring tool if you already have osteoporosis. -Breast cancer screenings are recommended every other year for women of normal risk, age 54-69.  -Cervical cancer screenings for women over age 72 are only recommended with certain risk factors.      Here is a list of your current Health Maintenance items (your personalized list of preventive services) with a due date:  Health Maintenance Due   Topic Date Due    Diabetic Foot Care  06/10/2022    Albumin Urine Test  06/10/2022    Cholesterol Test   06/10/2022

## 2022-06-09 NOTE — PROGRESS NOTES
HISTORY OF PRESENT ILLNESS  Tony Balderas is a 62 y.o. female. /72 (BP 1 Location: Left upper arm, BP Patient Position: Sitting, BP Cuff Size: Large adult)   Pulse 90   Temp (!) 96.4 °F (35.8 °C) (Temporal)   Resp 16   Ht 5' 7\" (1.702 m)   Wt 245 lb 6.4 oz (111.3 kg)   SpO2 98%   BMI 38.44 kg/m²     ASSESSMENT and PLAN    ICD-10-CM ICD-9-CM    1. Medicare annual wellness visit, subsequent  Z00.00 V70.0       This is the Subsequent Medicare Annual Wellness Exam, performed 12 months or more after the Initial AWV or the last Subsequent AWV    I have reviewed the patient's medical history in detail and updated the computerized patient record. Assessment/Plan   Education and counseling provided:  Are appropriate based on today's review and evaluation    1.  Medicare annual wellness visit, subsequent       Depression Risk Factor Screening     3 most recent PHQ Screens 6/9/2022   PHQ Not Done -   Little interest or pleasure in doing things Not at all   Feeling down, depressed, irritable, or hopeless More than half the days   Total Score PHQ 2 2   Trouble falling or staying asleep, or sleeping too much Not at all   Feeling tired or having little energy Not at all   Poor appetite, weight loss, or overeating Not at all   Feeling bad about yourself - or that you are a failure or have let yourself or your family down Not at all   Trouble concentrating on things such as school, work, reading, or watching TV Not at all   Moving or speaking so slowly that other people could have noticed; or the opposite being so fidgety that others notice Not at all   Thoughts of being better off dead, or hurting yourself in some way Not at all   PHQ 9 Score 2   How difficult have these problems made it for you to do your work, take care of your home and get along with others Not difficult at all       Alcohol & Drug Abuse Risk Screen    Do you average more than 1 drink per night or more than 7 drinks a week:  No    On any one occasion in the past three months have you have had more than 3 drinks containing alcohol:  No          Functional Ability and Level of Safety    Hearing: Hearing is good. Activities of Daily Living: The home contains: raised toilet seat  Patient does total self care      Ambulation: with difficulty, can't walk further than 2-3 blocks     Fall Risk:  Fall Risk Assessment, last 12 mths 6/9/2022   Able to walk? Yes   Fall in past 12 months? 0   Do you feel unsteady? 0   Are you worried about falling 0      Abuse Screen:  Patient is not abused       Cognitive Screening    Has your family/caregiver stated any concerns about your memory: no  But she is under a lot of stress right now. Cognitive Screening: Normal -    remember an address given to her 5 minutes,    Health Maintenance Due     Health Maintenance Due   Topic Date Due    Foot Exam Q1  06/10/2022    MICROALBUMIN Q1  06/10/2022    Lipid Screen  06/10/2022       Patient Care Team   Patient Care Team:  Robley Goltz, MD as PCP - General (Internal Medicine Physician)  Robley Goltz, MD as PCP - [facility]  Erna Chamberlain MD as Consulting Provider (Endocrinology Physician)  Gilma Gomez MD as Consulting Provider (Obstetrics & Gynecology)  Ivory Ware MD as Consulting Provider (Orthopedic Surgery)  Anmol Elizabeth MD as Consulting Provider (Urology)  Lexi Shaikh MD as Physician (Optometry)  Jolly Charles MD as Consulting Provider (Orthopedic Surgery)  Constantine Najera MD as Physician (Gastroenterology)  Anahi Grimes MD as Consulting Provider ([address] [place] [address] Vascular Surgery)  Zarina Khan MD (Audiology)  Carol Boone NP as Nurse Practitioner (Endocrinology Physician)  Rich Youssef MD as Physician (Internal Medicine Physician)    History     Patient Active Problem List   Diagnosis Code    Migraine aura without headache G43. 109    Fatigue R53.83    Urge incontinence N39.41    OAB (overactive bladder) N32.81    Disorder of urinary tract N39.9    Constipation K59.00    Urinary incontinence R32    Dysfunctional voiding of urine N39.8    Hematuria R31.9    Type 2 diabetes mellitus without complication, without long-term current use of insulin (HCC) E11.9    Dyslipidemia E78.5    Type 2 diabetes mellitus with nephropathy (HCC) E11.21    Severe obesity (BMI 35.0-39. 9) with comorbidity ([address] E66.01    HTN (hypertension) I10    Diabetes ([address] E11.9    Peripheral vascular disease ([address] I73.9    Depression F32. A     Past Medical History:   Diagnosis Date    Abnormal bone density screening 3/10/2011    Anemia 1996    Arthritis     Baker's cyst     left    Cardiomyopathy (HCC)     Chicken pox     Constipation     Constipation due to pain medication     CTS (carpal tunnel syndrome) 1999    bilat.  Depression 2000    Diabetes ([address]     Diabetes mellitus     type 2 diabetes mellitus without complication, without long-term current use of insulin. type 2 diabetes mellitus with nephropathy     Disorder of urinary tract     Dysfunctional voiding of urine     Dyslipidemia     Essential hypertension     Fatigue     multifactorial    Fibromyalgia 2000    GERD (gastroesophageal reflux disease)     H/O bone density study 04/03/2017    osteopenia, SEE MEDIA    Hematuria     History of meniscal tear     HTN (hypertension)     Incontinence     Metacarpal bone fracture 2003    left 5th    Migraine     aura without headache    Nephropathy     due to diabetes?  OAB (overactive bladder)     documented on UDS 2009    Obesity     Osteopenia 03/2006    Plantar fasciitis 1996    Pre-syncope 7/19/16    Primary osteoarthritis of left knee     Renal insufficiency     PER DR. JARAD WARREN    Restless leg     Severe obesity (BMI 35.0-39. 9) with comorbidity ([address]     Sleep apnea     C-PAP    Type II or unspecified type diabetes mellitus with neurological manifestations, uncontrolled(250.62) [facility]     Unspecified hereditary and idiopathic peripheral neuropathy     Urge incontinence     Urinary incontinence     unspec.  Urinary tract infection, site not specified     Vitamin D deficiency       Past Surgical History:   Procedure Laterality Date    CARDIAC CATHETERIZATION  2006    EGD  2008    HX ARTHROTOMY Left 2015    upper arm     HX CARPAL TUNNEL RELEASE Left 2016    wrist     HX  SECTION      x2    HX CHOLECYSTECTOMY      HX COLONOSCOPY  2014    HX ENDOSCOPY      ENDOSCOPY,COLON, DIAGNOSTIC    HX KNEE ARTHROSCOPY Left 2018    HX KNEE REPLACEMENT Left 2018    HX OTHER SURGICAL Left 2015    SHOULDER REPAIR    HX UROLOGICAL  2021    CYSTOSCOPY BLADDER BOTOX INJECTION 200 UNITS; Dr. Erika Saeed HX UROLOGICAL  2022    CYSTOSCOPY WITH BLADDER BOTOX INJECTIONS 200 UNITS; Dr. Nate Roque     Current Outpatient Medications   Medication Sig Dispense Refill    pantoprazole (PROTONIX) 40 mg tablet Take 1 Tablet by mouth Daily (before breakfast).  Linzess 290 mcg cap capsule take 1 capsule by mouth once daily before breakfast 30 Capsule 5    SUMAtriptan succinate 6 mg/0.5 mL kit inject 1 dose (6 MG) subcutaneously for migraines 3 Kit 5    enalapril (VASOTEC) 20 mg tablet take 1 tablet by mouth once daily 90 Tablet 3    dulaglutide (Trulicity) 3 FN/4.5 mL pnij 3 mg by SubCUTAneous route every seven (7) days. Indications: type 2 diabetes mellitus 4 Adjustable Dose Pre-filled Pen Syringe 0    metFORMIN (GLUCOPHAGE) 500 mg tablet Take 1 Tablet by mouth daily (with lunch).  tiZANidine (ZANAFLEX) 4 mg tablet Take 1 Tab by mouth nightly as needed for Muscle Spasm(s).  Indications: muscle spasm 30 Tab 2    baclofen (LIORESAL) 10 mg tablet take 1 tablet by mouth three times a day      polyethylene glycol (MIRALAX) 17 gram/dose powder take 17GM (DISSOLVED IN WATER) by mouth once daily      estradioL (Estrace) 0.01 % (0.1 mg/gram) vaginal cream INSERT 2 GRAM INTO VAGINA DAILY. APPLY FINGERTIP AMOUNT TO OUTSIDE OF VAGINAL OPENING 42.5 g 11    lidocaine (LIDODERM) 5 % Apply patch to the affected area for 12 hours a day and remove for 12 hours a day. 30 Each 5    Nyamyc powder APPLY A THIN LAYER UNDER BREASTS TWICE A DAY AS NEEDED FOR FLARE 60 g 11    cholecalciferol (VITAMIN D3) 25 mcg (1,000 unit) cap Vitamin D3 25 mcg (1,000 unit) capsule   Take by oral route.  camphor-menthol (SARNA ORIGINAL) 0.5-0.5 % lotion Apply  to affected area as needed for Itching.  diclofenac (VOLTAREN) 1 % gel Apply 2 g to affected area four (4) times daily. 100 g 5    triamcinolone acetonide (KENALOG) 0.025 % topical cream Apply  to affected area two (2) times a day. use thin layer 15 g 5    acetaminophen (TYLENOL EXTRA STRENGTH) 500 mg tablet Take 1 Tab by mouth every six (6) hours as needed for Pain. 30 Tab 0    cyanocobalamin 1,000 mcg tablet Take 1 Tab by Mouth Once a Day.  Insulin Needles, Disposable, (BD ULTRA-FINE MINI PEN NEEDLE) 31 gauge x 3/16\" ndle BD Ultra-Fine Mini Pen Needle 31 gauge x 3/16\"      lancets (ONETOUCH DELICA LANCETS) 30 gauge misc OneTouch Delica Lancets 30 gauge      EVELIA PEN NEEDLE 32 gauge x 5/32\" ndle inject once daily  0    carvedilol (COREG) 12.5 mg tablet take 1 tablet by mouth twice a day  0    DULoxetine (CYMBALTA) 60 mg capsule Take 120 mg by mouth daily. 0    hydrOXYzine (ATARAX) 50 mg tablet take 1 tablet by mouth every 12 hours  0    multivitamin (ONE A DAY) tablet Take 1 Tab by mouth daily.  spironolactone (ALDACTONE) 25 mg tablet Take 25 mg by mouth daily.  coenzyme q10-vitamin e (COQ10 ) 100-100 mg-unit cap Take  by mouth two (2) times a day.  clonazepam (KLONOPIN) 0.5 mg tablet Take  by mouth two (2) times a day.  rosuvastatin (CRESTOR) 40 mg tablet Take 40 mg by mouth daily.  NON FORMULARY       fenofibrate micronized (LOFIBRA) 134 mg capsule Take  by mouth every morning.  aspirin 81 mg chewable tablet Take 81 mg by mouth daily.  CALCIUM CARBONATE/VITAMIN D3 (CALCIUM 600 + D,3, PO) Take 1 Cap by mouth daily.  nitrofurantoin, macrocrystal-monohydrate, (Macrobid) 100 mg capsule Take 1 Capsule by mouth two (2) times a day. (Patient not taking: Reported on 6/9/2022) 14 Capsule 1    dicyclomine (BENTYL) 10 mg capsule Take 1 Capsule by mouth three (3) times daily as needed for Abdominal Cramps.  Indications: bowel spasm (Patient not taking: Reported on 4/18/2022) 12 Capsule 0     Allergies   Allergen Reactions    Digoxin Nausea Only    Niaspan [Niacin] Rash    Wellbutrin [Bupropion Hcl] Itching       Family History   Problem Relation Age of Onset    Hypertension Mother     Heart Disease Mother     Kidney Disease Mother     Heart Disease Father     Emphysema Father      Social History     Tobacco Use    Smoking status: Never Smoker    Smokeless tobacco: Never Used   Substance Use Topics    Alcohol use: No         Madyson Prather MD

## 2022-06-09 NOTE — PROGRESS NOTES
1. \"Have you been to the ER, urgent care clinic since your last visit? Hospitalized since your last visit? \" No    2. \"Have you seen or consulted any other health care providers outside of the [address] [place] [address] since your last visit? \" No     3. For patients aged 39-70: Has the patient had a colonoscopy / FIT/ Cologuard? Yes - no Care Gap present      If the patient is female:    4. For patients aged 41-77: Has the patient had a mammogram within the past 2 years? Yes - no Care Gap present      5. For patients aged 21-65: Has the patient had a pap smear? Yes - Care Gap present.  Rooming MA/LPN to request most recent results

## 2022-07-02 DIAGNOSIS — Z76.0 MEDICATION REFILL: ICD-10-CM

## 2022-07-02 RX ORDER — ENALAPRIL MALEATE 20 MG/1
TABLET ORAL
Qty: 90 TABLET | Refills: 3 | Status: SHIPPED | OUTPATIENT
Start: 2022-07-02

## 2022-07-05 LAB — HBA1C MFR BLD HPLC: 6.5 %

## 2022-08-22 ENCOUNTER — OFFICE VISIT (OUTPATIENT)
Dept: INTERNAL MEDICINE CLINIC | Age: 62
End: 2022-08-22
Payer: MEDICARE

## 2022-08-22 VITALS
TEMPERATURE: 98 F | WEIGHT: 250 LBS | SYSTOLIC BLOOD PRESSURE: 103 MMHG | BODY MASS INDEX: 39.24 KG/M2 | RESPIRATION RATE: 18 BRPM | OXYGEN SATURATION: 99 % | HEIGHT: 67 IN | HEART RATE: 90 BPM | DIASTOLIC BLOOD PRESSURE: 64 MMHG

## 2022-08-22 DIAGNOSIS — E11.21 TYPE 2 DIABETES MELLITUS WITH NEPHROPATHY (HCC): ICD-10-CM

## 2022-08-22 DIAGNOSIS — R42 VERTIGO: Primary | ICD-10-CM

## 2022-08-22 DIAGNOSIS — N64.59 BREAST COMPLAINT: ICD-10-CM

## 2022-08-22 PROCEDURE — 3017F COLORECTAL CA SCREEN DOC REV: CPT | Performed by: INTERNAL MEDICINE

## 2022-08-22 PROCEDURE — G8752 SYS BP LESS 140: HCPCS | Performed by: INTERNAL MEDICINE

## 2022-08-22 PROCEDURE — G9717 DOC PT DX DEP/BP F/U NT REQ: HCPCS | Performed by: INTERNAL MEDICINE

## 2022-08-22 PROCEDURE — G8417 CALC BMI ABV UP PARAM F/U: HCPCS | Performed by: INTERNAL MEDICINE

## 2022-08-22 PROCEDURE — 99213 OFFICE O/P EST LOW 20 MIN: CPT | Performed by: INTERNAL MEDICINE

## 2022-08-22 PROCEDURE — 3044F HG A1C LEVEL LT 7.0%: CPT | Performed by: INTERNAL MEDICINE

## 2022-08-22 PROCEDURE — 2022F DILAT RTA XM EVC RTNOPTHY: CPT | Performed by: INTERNAL MEDICINE

## 2022-08-22 PROCEDURE — G8754 DIAS BP LESS 90: HCPCS | Performed by: INTERNAL MEDICINE

## 2022-08-22 PROCEDURE — G8427 DOCREV CUR MEDS BY ELIG CLIN: HCPCS | Performed by: INTERNAL MEDICINE

## 2022-08-22 PROCEDURE — G9899 SCRN MAM PERF RSLTS DOC: HCPCS | Performed by: INTERNAL MEDICINE

## 2022-08-22 RX ORDER — MECLIZINE HYDROCHLORIDE 25 MG/1
25 TABLET ORAL
Qty: 45 TABLET | Refills: 3 | Status: SHIPPED | OUTPATIENT
Start: 2022-08-22 | End: 2022-09-01

## 2022-08-22 NOTE — PROGRESS NOTES
1. \"Have you been to the ER, urgent care clinic since your last visit? Hospitalized since your last visit? \" No    2. \"Have you seen or consulted any other health care providers outside of the [address] [place] [address] since your last visit? \" Yes Dr. Brice Boland, Dr. Duc Wayne, Therapist, Brittney Euceda, Dr. Stoney Jo, Cardiology, Sleep Medicine      3. For patients aged 39-70: Has the patient had a colonoscopy / FIT/ Cologuard? Yes - no Care Gap present      If the patient is female:    4. For patients aged 41-77: Has the patient had a mammogram within the past 2 years? Yes - no Care Gap present      5. For patients aged 21-65: Has the patient had a pap smear?  No

## 2022-08-22 NOTE — PROGRESS NOTES
HISTORY OF PRESENT ILLNESS  Maddie Thornton is a 65 y.o. female. /64 (BP 1 Location: Right arm, BP Patient Position: Sitting, BP Cuff Size: Large adult)   Pulse 90   Temp 98 °F (36.7 °C) (Temporal)   Resp 18   Ht 5' 7\" (1.702 m)   Wt 250 lb (113.4 kg)   SpO2 99%   BMI 39.16 kg/m²     About 2 week h/o vertigo  States her eyes were fluttering from time to time. Has seen her neurologist ans was told it was vertigo. She did not give her an RX    Since then she has Dr. Garcia. They wrote an RX for 12.5 mg meclizine. This has helped minimally    She has not seen her ENT for this, Dr. Bradford Mariee. She had vestibular testing pre-COVID and says she was told \"everything was OK\"    Worse with head movement in all directions! But susu bad when leaning over, then straightening up. No other associated sxs. Dizziness   The history is provided by the Patient. This is a new problem. The current episode started more than 1 week ago. Associated symptoms include congestion (nasal drainage in the morning (wears c-PAP at night)) and dizziness. Breast Problem  The history is provided by the Patient (thinks she might have felt a mass on her right breast). This is a new problem. Review of Systems   HENT:  Positive for congestion (nasal drainage in the morning (wears c-PAP at night)). Negative for ear discharge, ear pain, hearing loss and sore throat. Neurological:  Positive for dizziness. Physical Exam  Vitals and nursing note reviewed. Constitutional:       Appearance: Normal appearance. She is well-developed. HENT:      Head: Normocephalic and atraumatic. Right Ear: Tympanic membrane and ear canal normal.      Left Ear: Tympanic membrane and ear canal normal.      Nose: Congestion and rhinorrhea present. Eyes:      Extraocular Movements: Extraocular movements intact. Pupils: Pupils are equal, round, and reactive to light.    Cardiovascular:      Rate and Rhythm: Normal rate and regular rhythm. Pulmonary:      Effort: Pulmonary effort is normal.      Breath sounds: Normal breath sounds. Chest:   Breasts:     Left: Normal.          Comments: Some fibrous densities on left breast without single discrete mass  Musculoskeletal:      Right lower leg: No edema. Left lower leg: No edema. Skin:     General: Skin is warm and dry. Neurological:      General: No focal deficit present. Mental Status: She is alert and oriented to person, place, and time. Psychiatric:         Mood and Affect: Mood normal.         Behavior: Behavior normal.       ASSESSMENT and PLAN    ICD-10-CM ICD-9-CM    1. Vertigo  R42 780.4       2. Type 2 diabetes mellitus with nephropathy (HCC)  E11.21 250.40 REFERRAL TO NUTRITION     583.81       3. Breast complaint  N64.59 611.79         Recurrent sxs with prior negative vestibular testing  No clear etiology today for worsening sxs  ?  Related to recent COVID infections  Will inc meclizine to 25 mg TID prn  Nothing apparent noted on left breast today    F/u as appointed

## 2022-10-26 ENCOUNTER — HOSPITAL ENCOUNTER (OUTPATIENT)
Dept: NUTRITION | Age: 62
Discharge: HOME | End: 2022-10-26
Payer: MEDICARE

## 2022-10-26 PROCEDURE — 97802 MEDICAL NUTRITION INDIV IN: CPT

## 2022-10-28 DIAGNOSIS — G89.29 CHRONIC LOW BACK PAIN, UNSPECIFIED BACK PAIN LATERALITY, UNSPECIFIED WHETHER SCIATICA PRESENT: ICD-10-CM

## 2022-10-28 DIAGNOSIS — R25.2 LEG CRAMPS: ICD-10-CM

## 2022-10-28 DIAGNOSIS — M54.50 CHRONIC LOW BACK PAIN, UNSPECIFIED BACK PAIN LATERALITY, UNSPECIFIED WHETHER SCIATICA PRESENT: ICD-10-CM

## 2022-10-28 RX ORDER — LIDOCAINE 50 MG/G
PATCH TOPICAL
Qty: 30 EACH | Refills: 5 | Status: SHIPPED | OUTPATIENT
Start: 2022-10-28

## 2022-10-28 RX ORDER — TIZANIDINE 4 MG/1
4 TABLET ORAL
Qty: 30 TABLET | Refills: 2 | Status: SHIPPED | OUTPATIENT
Start: 2022-10-28

## 2022-10-28 NOTE — TELEPHONE ENCOUNTER
Pharmacy fax request for refill. Last OV 8/2/22, next scheduled 11/01/22. Requested Prescriptions     Pending Prescriptions Disp Refills    tiZANidine (ZANAFLEX) 4 mg tablet 30 Tablet 2     Sig: Take 1 Tablet by mouth nightly as needed for Muscle Spasm(s). Indications: muscle spasm    lidocaine (LIDODERM) 5 % 30 Each 5     Sig: Apply patch to the affected area for 12 hours a day and remove for 12 hours a day.

## 2022-10-30 NOTE — PROGRESS NOTES
[address] [place] [address]     Nutrition Assessment - Medical Nutrition Therapy   Outpatient Initial Evaluation         Patient Name: Vu Finley : 1960   Treatment Diagnosis: Type 2 diabetes     Referral Source: Delores Fischer MD [facility] of [facility]): 10/26/2022     Gender: female Age: 65 y.o. Ht: 67 in Wt: 246  lb  kg   BMI: 44 BMR   Male  BMR Female 1400     Past Medical History:  Type 2 DM  HTN         Pertinent Medications:   Protonix     Biochemical Data:   Lab Results   Component Value Date/Time    Hemoglobin A1c 6.5 (H) 2017 04:07 PM    Hemoglobin A1c (POC) 5.9 2018 12:10 PM    Hemoglobin A1c, External 6.4 2022 12:00 AM     Lab Results   Component Value Date/Time    Sodium 141 2021 04:32 PM    Potassium 4.4 2021 04:32 PM    Chloride 109 2021 04:32 PM    CO2 28 2021 04:32 PM    Anion gap 4 2021 04:32 PM    Glucose 109 (H) 2021 04:32 PM    BUN 21 (H) 2021 04:32 PM    Creatinine 1.16 2021 04:32 PM    BUN/Creatinine ratio 18 2021 04:32 PM    GFR est AA 58 (L) 2021 04:32 PM    GFR est non-AA 48 (L) 2021 04:32 PM    Calcium 9.6 2021 04:32 PM    Bilirubin, total 0.3 2021 04:32 PM    Alk.  phosphatase 60 2021 04:32 PM    Protein, total 7.4 2021 04:32 PM    Albumin 3.8 2021 04:32 PM    Globulin 3.6 2021 04:32 PM    A-G Ratio 1.1 2021 04:32 PM    ALT (SGPT) 25 2021 04:32 PM    AST (SGOT) 16 2021 04:32 PM     Lab Results   Component Value Date/Time    Cholesterol, total 113 06/10/2021 12:01 PM    HDL Cholesterol 55 06/10/2021 12:01 PM    LDL-C, External 48 2019 12:00 AM    LDL, calculated 40.4 06/10/2021 12:01 PM    VLDL, calculated 17.6 06/10/2021 12:01 PM    Triglyceride 88 06/10/2021 12:01 PM    CHOL/HDL Ratio 2.1 06/10/2021 12:01 PM     Lab Results   Component Value Date/Time    ALT (SGPT) 25 2021 04:32 PM    AST (SGOT) 16 11/29/2021 04:32 PM    Alk. phosphatase 60 11/29/2021 04:32 PM    Bilirubin, total 0.3 11/29/2021 04:32 PM     Lab Results   Component Value Date/Time    Creatinine 1.16 11/29/2021 04:32 PM     Lab Results   Component Value Date/Time    BUN 21 (H) 11/29/2021 04:32 PM     Lab Results   Component Value Date/Time    Microalbumin/Creat ratio (mg/g creat) 9 06/09/2022 12:24 PM    Microalbumin,urine random 1.33 06/09/2022 12:24 PM        Assessment:   Patient is a 65year old female with type 2 DM and a BMI of 39 who visits RD for insight on how to improve Hgb A1c and how to lose weight. Patient struggles with depression at times. Patient's activity consists of a sedentary lifestyle. Patient's sleeping habits need improvement. Food & Nutrition: Patient eats 3 meals a day. Breakfast may be instant oatmeal.  Lunch may be salad, cold cuts. Dinner may be chicken and vegetables. Snacks may be an assortment of sweets when stressed. Estimate Needs   Calories: 1400  Protein: 60 Carbs: <175 Fat: 80   Kcal/day  g/day  g/day  g/day                            Nutrition Diagnosis Altered nutrition related laboratory values related to diabetes as evidenced by Hgb A1c of 6.5. Overweight/obesity related to excessive energy intake as evidenced by a BMI of 39. Undesirable food choices as evidenced by patients food recall; patient may eat sweets when stressed. Disordered eating pattern; patient may overeat because of stress. Excessive Carbohydrate intake related to food -and nutrition- knowledge deficit and food and nutrition compliance limitations as evidenced by Hyperglycemia (fasting BG > 126 mg/dL), and Hemoglobin A1c > 6%, and pt's food recall reveals high carbohydrate intake at meals and snacks. Nutrition Intervention &  Education: Encouraged pt to drink >64 ounces of only calorie free or very low calorie/carb beverages only.      Educated pt on all carbohydrates found in foods and encouraged no more than 30 gm/meal and 15-20 gm/snack. Patient has been educated on combining all carbohydrate containing food with a protein source and/or a vegetable source. Educated pt on the pathophysiology of Type II Diabetes and insulin resistance and the rationale for dietary modification and increased activity. Patient was educated on the importance of making lifestyle changes such as:  Eating off a smaller plate and drinking from smaller glasses. Increasing activity. Menu planning and tracking. Recommend My Fitness Pal for tracking. The importance of eating breakfast.  Appropriate snacks. Portion control. The importance of good sleeping habits. How to read a label. Patient was encouraged to do this before purchasing and consuming an item. Handouts Provided: [x]  Carbohydrates  [x]  Protein  [x]  Non-starchy Vegatbles  [x]  Food Label  [x]  Meal and Snack Ideas  [x]  Food Journals []  Diabetes  []  Cholesterol  []  Sodium  [x]  Gen Nutr Guidelines  []  SBGM Guidelines  []  Others:   Information Reviewed with: Patient    Readiness to Change Stage:   []  Pre-contemplative    []  Contemplative  [x]  Preparation               []  Action                  []  Maintenance   Potential Barriers to Learning: []  Decline in memory    []  Language barrier   []  Other:  []  Emotional                  []  Limited mobility  []  Lack of motivation     [] Vision, hearing or cognitive impairment   Expected Compliance: Good     Nutritional Goal - To promote lifestyle changes to result in:    [x]  Weight loss  [x]  Improved diabetic control  []  Decreased cholesterol levels  []  Decreased blood pressure  []  Weight maintenance []  Preventing any interactions associated with food allergies  []  Adequate weight gain toward goal weight  []  Other:        Patient Goals:   >64 ounces of water. Monitor carb intake. Dietitian Signature:  Kay Guerrero RD Date: 10/26/2022   Follow-up: Scheduled not scheduled

## 2022-11-01 ENCOUNTER — OFFICE VISIT (OUTPATIENT)
Dept: INTERNAL MEDICINE CLINIC | Age: 62
End: 2022-11-01
Payer: MEDICARE

## 2022-11-01 VITALS
RESPIRATION RATE: 15 BRPM | OXYGEN SATURATION: 95 % | HEIGHT: 67 IN | BODY MASS INDEX: 38.48 KG/M2 | DIASTOLIC BLOOD PRESSURE: 67 MMHG | SYSTOLIC BLOOD PRESSURE: 109 MMHG | HEART RATE: 97 BPM | TEMPERATURE: 98.2 F | WEIGHT: 245.2 LBS

## 2022-11-01 DIAGNOSIS — R42 VERTIGO: Primary | ICD-10-CM

## 2022-11-01 DIAGNOSIS — Z23 NEEDS FLU SHOT: ICD-10-CM

## 2022-11-01 PROCEDURE — G8427 DOCREV CUR MEDS BY ELIG CLIN: HCPCS | Performed by: INTERNAL MEDICINE

## 2022-11-01 PROCEDURE — G0008 ADMIN INFLUENZA VIRUS VAC: HCPCS | Performed by: INTERNAL MEDICINE

## 2022-11-01 PROCEDURE — G9717 DOC PT DX DEP/BP F/U NT REQ: HCPCS | Performed by: INTERNAL MEDICINE

## 2022-11-01 PROCEDURE — 3078F DIAST BP <80 MM HG: CPT | Performed by: INTERNAL MEDICINE

## 2022-11-01 PROCEDURE — 90686 IIV4 VACC NO PRSV 0.5 ML IM: CPT | Performed by: INTERNAL MEDICINE

## 2022-11-01 PROCEDURE — 99213 OFFICE O/P EST LOW 20 MIN: CPT | Performed by: INTERNAL MEDICINE

## 2022-11-01 PROCEDURE — G8754 DIAS BP LESS 90: HCPCS | Performed by: INTERNAL MEDICINE

## 2022-11-01 PROCEDURE — G8417 CALC BMI ABV UP PARAM F/U: HCPCS | Performed by: INTERNAL MEDICINE

## 2022-11-01 PROCEDURE — 3074F SYST BP LT 130 MM HG: CPT | Performed by: INTERNAL MEDICINE

## 2022-11-01 PROCEDURE — G8752 SYS BP LESS 140: HCPCS | Performed by: INTERNAL MEDICINE

## 2022-11-01 PROCEDURE — G9899 SCRN MAM PERF RSLTS DOC: HCPCS | Performed by: INTERNAL MEDICINE

## 2022-11-01 PROCEDURE — 3017F COLORECTAL CA SCREEN DOC REV: CPT | Performed by: INTERNAL MEDICINE

## 2022-11-01 RX ORDER — SCOLOPAMINE TRANSDERMAL SYSTEM 1 MG/1
1 PATCH, EXTENDED RELEASE TRANSDERMAL
Qty: 4 PATCH | Refills: 1 | Status: SHIPPED | OUTPATIENT
Start: 2022-11-01

## 2022-11-01 NOTE — PROGRESS NOTES
HISTORY OF PRESENT ILLNESS  Tajik Sons is a 65 y.o. female. /67 (BP 1 Location: Left upper arm, BP Patient Position: Sitting)   Pulse 97   Temp 98.2 °F (36.8 °C) (Temporal)   Resp 15   Ht 5' 7\" (1.702 m)   Wt 245 lb 3.2 oz (111.2 kg)   SpO2 95%   BMI 38.40 kg/m²       States her vertigo is getting worse. She now has eye movement which is disconcerting    She has an appointment with Dr. Mike Roy coming up about her vertigo    She says she also has problems with her eyeglasses. She thinks they are not helping    Laying on either side makes it worse      Dizziness   The history is provided by the Patient. This is a chronic problem. The current episode started more than 1 week ago. Associated symptoms include dizziness. Pertinent negatives include no chest pain and no palpitations. Review of Systems   HENT:          No ringing in her ears. No black out spells. Has fallen a couple of times. Cardiovascular:  Negative for chest pain and palpitations. Neurological:  Positive for dizziness. Physical Exam  Vitals and nursing note reviewed. Constitutional:       Appearance: Normal appearance. She is well-developed. HENT:      Head: Normocephalic and atraumatic. Eyes:      Extraocular Movements: Extraocular movements intact. Conjunctiva/sclera: Conjunctivae normal.   Cardiovascular:      Rate and Rhythm: Normal rate and regular rhythm. Pulmonary:      Effort: Pulmonary effort is normal.      Breath sounds: Normal breath sounds. Musculoskeletal:      Right lower leg: No edema. Left lower leg: No edema. Skin:     General: Skin is warm and dry. Neurological:      General: No focal deficit present. Mental Status: She is alert and oriented to person, place, and time. Comments: No nystagmus noted with closed eye head movement   Psychiatric:         Mood and Affect: Mood normal.         Behavior: Behavior normal.       ASSESSMENT and PLAN    ICD-10-CM ICD-9-CM    1. Vertigo  R42 780.4 scopolamine (TRANSDERM-SCOP) 1 mg over 3 days pt3d      2. Needs flu shot  Z23 V04.81 INFLUENZA, FLUARIX, FLULAVAL, FLUZONE (AGE 6 MO+), AFLURIA(AGE 3Y+) IM, PF, 0.5 ML        Will try adding scopolamine patch pending evaluation by neurology, Dr. Peggy Jules most recent lab.  Blood sugar has been good  F/u here as usual in December

## 2022-11-01 NOTE — ADDENDUM NOTE
Addended by: Neftaly Harding on: 11/1/2022 04:11 PM     Modules accepted: Orders, Level of Service, SmartSet

## 2022-11-15 ENCOUNTER — OFFICE VISIT (OUTPATIENT)
Dept: INTERNAL MEDICINE CLINIC | Age: 62
End: 2022-11-15
Payer: MEDICARE

## 2022-11-15 VITALS
TEMPERATURE: 97 F | OXYGEN SATURATION: 99 % | HEIGHT: 67 IN | SYSTOLIC BLOOD PRESSURE: 111 MMHG | BODY MASS INDEX: 39.24 KG/M2 | HEART RATE: 95 BPM | WEIGHT: 250 LBS | RESPIRATION RATE: 18 BRPM | DIASTOLIC BLOOD PRESSURE: 65 MMHG

## 2022-11-15 DIAGNOSIS — L29.3 PERINEAL ITCHING, FEMALE: Primary | ICD-10-CM

## 2022-11-15 DIAGNOSIS — R30.0 DYSURIA: ICD-10-CM

## 2022-11-15 PROCEDURE — G9899 SCRN MAM PERF RSLTS DOC: HCPCS | Performed by: INTERNAL MEDICINE

## 2022-11-15 PROCEDURE — 99213 OFFICE O/P EST LOW 20 MIN: CPT | Performed by: INTERNAL MEDICINE

## 2022-11-15 PROCEDURE — G9717 DOC PT DX DEP/BP F/U NT REQ: HCPCS | Performed by: INTERNAL MEDICINE

## 2022-11-15 PROCEDURE — G8417 CALC BMI ABV UP PARAM F/U: HCPCS | Performed by: INTERNAL MEDICINE

## 2022-11-15 PROCEDURE — G8754 DIAS BP LESS 90: HCPCS | Performed by: INTERNAL MEDICINE

## 2022-11-15 PROCEDURE — 3017F COLORECTAL CA SCREEN DOC REV: CPT | Performed by: INTERNAL MEDICINE

## 2022-11-15 PROCEDURE — G8752 SYS BP LESS 140: HCPCS | Performed by: INTERNAL MEDICINE

## 2022-11-15 PROCEDURE — G8427 DOCREV CUR MEDS BY ELIG CLIN: HCPCS | Performed by: INTERNAL MEDICINE

## 2022-11-15 PROCEDURE — 3078F DIAST BP <80 MM HG: CPT | Performed by: INTERNAL MEDICINE

## 2022-11-15 PROCEDURE — 3074F SYST BP LT 130 MM HG: CPT | Performed by: INTERNAL MEDICINE

## 2022-11-15 RX ORDER — AMOXICILLIN AND CLAVULANATE POTASSIUM 875; 125 MG/1; MG/1
1 TABLET, FILM COATED ORAL 2 TIMES DAILY
Qty: 14 TABLET | Refills: 0 | Status: SHIPPED | OUTPATIENT
Start: 2022-11-15 | End: 2022-11-22

## 2022-11-15 NOTE — PROGRESS NOTES
1. \"Have you been to the ER, urgent care clinic since your last visit? Hospitalized since your last visit? \" No    2. \"Have you seen or consulted any other health care providers outside of the [address] [place] [address] since your last visit? \" No     3. For patients aged 39-70: Has the patient had a colonoscopy / FIT/ Cologuard? Yes - no Care Gap present      If the patient is female:    4. For patients aged 41-77: Has the patient had a mammogram within the past 2 years? Yes - no Care Gap present      5. For patients aged 21-65: Has the patient had a pap smear?  NA - based on age or sex

## 2022-11-15 NOTE — PROGRESS NOTES
HISTORY OF PRESENT ILLNESS  Lesvia Arndt is a 65 y.o. female. /65 (BP 1 Location: Right arm, BP Patient Position: Sitting, BP Cuff Size: Adult)   Pulse 95   Temp 97 °F (36.1 °C) (Temporal)   Resp 18   Ht 5' 7\" (1.702 m)   Wt 250 lb (113.4 kg)   SpO2 99%   BMI 39.16 kg/m²       Noted a foul odor when she voids. Started a couple of days ago  She has a slight perineal itching. Denies discharge    She has been going every couple of months for botox in her bladder and they note an infection when she goes in    She states she had her WWE with PAP recently        Review of Systems   Constitutional:  Negative for chills and fever. Gastrointestinal:  Negative for nausea. Genitourinary:  Positive for dysuria, frequency and urgency. Physical Exam  Vitals and nursing note reviewed. Constitutional:       Appearance: Normal appearance. She is well-developed. HENT:      Head: Normocephalic and atraumatic. Cardiovascular:      Rate and Rhythm: Normal rate and regular rhythm. Pulmonary:      Effort: Pulmonary effort is normal.      Breath sounds: Normal breath sounds. Genitourinary:     Pubic Area: No rash. Labia:         Right: No rash. Left: No rash. Urethra: No prolapse. Comments: Vaginal and perineal swab taken  Nothing obvious on external exam. No odor appreciated. No discharge from vaginal orifice noted  Musculoskeletal:      Right lower leg: No edema. Left lower leg: No edema. Skin:     General: Skin is warm and dry. Neurological:      General: No focal deficit present. Mental Status: She is alert and oriented to person, place, and time. Psychiatric:         Mood and Affect: Mood normal.         Behavior: Behavior normal.       ASSESSMENT and PLAN    ICD-10-CM ICD-9-CM    1.  Perineal itching, female  L29.3 698.0 URINALYSIS W/ RFLX MICROSCOPIC      CULTURE, URINE      NUSWAB VG PLUS+MYCOPLASMAS,RHONA      amoxicillin-clavulanate (AUGMENTIN) 875-125 mg per tablet      NUSWAB VG PLUS+MYCOPLASMAS,RHONA      2. Dysuria  R30.0 788. 1 URINALYSIS W/ RFLX MICROSCOPIC      CULTURE, URINE      NUSWAB VG PLUS+MYCOPLASMAS,RHONA      amoxicillin-clavulanate (AUGMENTIN) 875-125 mg per tablet      NUSWAB VG PLUS+MYCOPLASMAS,RHONA        Will check urine--send for micro and culture  The last few dips at urology with + for radha es about half the time. But no recent culture seen.   Will send NuSwab as well though sxs sound more urinary in origin

## 2022-11-16 ENCOUNTER — HOSPITAL ENCOUNTER (OUTPATIENT)
Dept: LAB | Age: 62
Discharge: HOME | End: 2022-11-16
Payer: MEDICARE

## 2022-11-16 DIAGNOSIS — L29.3 PERINEAL ITCHING, FEMALE: ICD-10-CM

## 2022-11-16 DIAGNOSIS — R30.0 DYSURIA: ICD-10-CM

## 2022-11-16 LAB
APPEARANCE UR: CLEAR
BILIRUB UR QL: NEGATIVE
COLOR UR: YELLOW
GLUCOSE UR STRIP.AUTO-MCNC: NEGATIVE MG/DL
HGB UR QL STRIP: NEGATIVE
KETONES UR QL STRIP.AUTO: NEGATIVE MG/DL
LEUKOCYTE ESTERASE UR QL STRIP.AUTO: NEGATIVE
NITRITE UR QL STRIP.AUTO: NEGATIVE
PH UR STRIP: 6 [PH] (ref 5–8)
PROT UR STRIP-MCNC: NEGATIVE MG/DL
SP GR UR REFRACTOMETRY: 1.02 (ref 1–1.03)
UROBILINOGEN UR QL STRIP.AUTO: 0.2 EU/DL (ref 0.2–1)

## 2022-11-16 PROCEDURE — 87086 URINE CULTURE/COLONY COUNT: CPT

## 2022-11-16 PROCEDURE — 81003 URINALYSIS AUTO W/O SCOPE: CPT

## 2022-11-18 LAB
BACTERIA SPEC CULT: NORMAL
SERVICE CMNT-IMP: NORMAL

## 2022-11-20 LAB
A VAGINAE DNA VAG QL NAA+PROBE: NORMAL SCORE
BVAB2 DNA VAG QL NAA+PROBE: NORMAL SCORE
C ALBICANS DNA VAG QL NAA+PROBE: NEGATIVE
C GLABRATA DNA VAG QL NAA+PROBE: NEGATIVE
C TRACH DNA VAG QL NAA+PROBE: NEGATIVE
M GENITALIUM DNA SPEC QL NAA+PROBE: NEGATIVE
M HOMINIS DNA SPEC QL NAA+PROBE: NEGATIVE
MEGA1 DNA VAG QL NAA+PROBE: NORMAL SCORE
N GONORRHOEA DNA VAG QL NAA+PROBE: NEGATIVE
T VAGINALIS DNA VAG QL NAA+PROBE: NEGATIVE
UREAPLASMA DNA SPEC QL NAA+PROBE: NEGATIVE

## 2022-12-05 ENCOUNTER — OFFICE VISIT (OUTPATIENT)
Dept: INTERNAL MEDICINE CLINIC | Age: 62
End: 2022-12-05
Payer: MEDICARE

## 2022-12-05 VITALS
BODY MASS INDEX: 39.08 KG/M2 | TEMPERATURE: 97.7 F | OXYGEN SATURATION: 97 % | WEIGHT: 249 LBS | HEART RATE: 97 BPM | HEIGHT: 67 IN | SYSTOLIC BLOOD PRESSURE: 116 MMHG | RESPIRATION RATE: 16 BRPM | DIASTOLIC BLOOD PRESSURE: 72 MMHG

## 2022-12-05 DIAGNOSIS — M79.671 PAIN IN BOTH FEET: ICD-10-CM

## 2022-12-05 DIAGNOSIS — E11.21 TYPE 2 DIABETES MELLITUS WITH NEPHROPATHY (HCC): ICD-10-CM

## 2022-12-05 DIAGNOSIS — M54.50 MIDLINE LOW BACK PAIN, UNSPECIFIED CHRONICITY, UNSPECIFIED WHETHER SCIATICA PRESENT: Primary | ICD-10-CM

## 2022-12-05 DIAGNOSIS — I10 PRIMARY HYPERTENSION: ICD-10-CM

## 2022-12-05 DIAGNOSIS — M79.672 PAIN IN BOTH FEET: ICD-10-CM

## 2022-12-05 PROCEDURE — G8427 DOCREV CUR MEDS BY ELIG CLIN: HCPCS | Performed by: INTERNAL MEDICINE

## 2022-12-05 PROCEDURE — G8754 DIAS BP LESS 90: HCPCS | Performed by: INTERNAL MEDICINE

## 2022-12-05 PROCEDURE — 3078F DIAST BP <80 MM HG: CPT | Performed by: INTERNAL MEDICINE

## 2022-12-05 PROCEDURE — G9899 SCRN MAM PERF RSLTS DOC: HCPCS | Performed by: INTERNAL MEDICINE

## 2022-12-05 PROCEDURE — 3074F SYST BP LT 130 MM HG: CPT | Performed by: INTERNAL MEDICINE

## 2022-12-05 PROCEDURE — 3044F HG A1C LEVEL LT 7.0%: CPT | Performed by: INTERNAL MEDICINE

## 2022-12-05 PROCEDURE — G8417 CALC BMI ABV UP PARAM F/U: HCPCS | Performed by: INTERNAL MEDICINE

## 2022-12-05 PROCEDURE — 3017F COLORECTAL CA SCREEN DOC REV: CPT | Performed by: INTERNAL MEDICINE

## 2022-12-05 PROCEDURE — 2022F DILAT RTA XM EVC RTNOPTHY: CPT | Performed by: INTERNAL MEDICINE

## 2022-12-05 PROCEDURE — 99214 OFFICE O/P EST MOD 30 MIN: CPT | Performed by: INTERNAL MEDICINE

## 2022-12-05 PROCEDURE — G9717 DOC PT DX DEP/BP F/U NT REQ: HCPCS | Performed by: INTERNAL MEDICINE

## 2022-12-05 PROCEDURE — G8752 SYS BP LESS 140: HCPCS | Performed by: INTERNAL MEDICINE

## 2022-12-05 RX ORDER — BLOOD SUGAR DIAGNOSTIC
STRIP MISCELLANEOUS
COMMUNITY
Start: 2022-10-26

## 2022-12-05 RX ORDER — METHOCARBAMOL 750 MG/1
750 TABLET, FILM COATED ORAL 3 TIMES DAILY
Qty: 60 TABLET | Refills: 1 | Status: SHIPPED | OUTPATIENT
Start: 2022-12-05

## 2022-12-05 RX ORDER — ARIPIPRAZOLE 2 MG/1
2 TABLET ORAL DAILY
COMMUNITY
Start: 2022-11-14

## 2022-12-05 RX ORDER — FLUTICASONE PROPIONATE 50 MCG
SPRAY, SUSPENSION (ML) NASAL
COMMUNITY

## 2022-12-05 NOTE — PROGRESS NOTES
Anali Mckeon presents today for   Chief Complaint   Patient presents with    Follow-up    Back Pain     Pt states started a week ago no known cause       Vitals:    12/05/22 1117   BP: 116/72   Pulse: 97   Resp: 16   Temp: 97.7 °F (36.5 °C)   TempSrc: Temporal   SpO2: 97%   Weight: 249 lb (112.9 kg)   Height: 5' 7\" (1.702 m)        Anali Mckeon preferred language for health care discussion is english/other. Is someone accompanying this pt? no    Is the patient using any DME equipment during [address]? no    Depression Screening:  3 most recent PHQ Screens 11/1/2022   PHQ Not Done -   Little interest or pleasure in doing things Not at all   Feeling down, depressed, irritable, or hopeless Not at all   Total Score PHQ 2 0   Trouble falling or staying asleep, or sleeping too much -   Feeling tired or having little energy -   Poor appetite, weight loss, or overeating -   Feeling bad about yourself - or that you are a failure or have let yourself or your family down -   Trouble concentrating on things such as school, work, reading, or watching TV -   Moving or speaking so slowly that other people could have noticed; or the opposite being so fidgety that others notice -   Thoughts of being better off dead, or hurting yourself in some way -   PHQ 9 Score -   How difficult have these problems made it for you to do your work, take care of your home and get along with others -       Learning Assessment:  Learning Assessment 11/24/2014   PRIMARY LEARNER Patient   HIGHEST LEVEL OF EDUCATION - PRIMARY LEARNER  2 YEARS OF COLLEGE   BARRIERS PRIMARY LEARNER NONE   CO-LEARNER CAREGIVER No   PRIMARY LANGUAGE ENGLISH   LEARNER PREFERENCE PRIMARY DEMONSTRATION   ANSWERED BY self   RELATIONSHIP SELF       Abuse Screening:  Abuse Screening Questionnaire 6/9/2022   Do you ever feel afraid of your partner? N   Are you in a relationship with someone who physically or mentally threatens you? N   Is it safe for you to go home?  Mar Bowman Generalized Anxiety  No flowsheet data found. Health Maintenance Due   Topic Date Due    Shingrix Vaccine Age 49> (1 of 2) Never done    Cervical cancer screen  08/30/2020    COVID-19 Vaccine (4 - Booster for Moderna series) 01/06/2022    DTaP/Tdap/Td series (2 - Td or Tdap) 11/01/2022   . Health Maintenance reviewed and discussed and ordered per Provider. VACCINES DUE   SCREENINGS DUE       Butch Alba is updated on all HM    1. \"Have you been to the ER, urgent care clinic since your last visit? Hospitalized since your last visit? \" No    2. \"Have you seen or consulted any other health care providers outside of the [address] [place] [address] since your last visit? \" No     3. For patients aged 39-70: Has the patient had a colonoscopy / FIT/ Cologuard? Yes - no Care Gap present     If the patient is female:    4. For patients aged 41-77: Has the patient had a mammogram within the past 2 years? Yes - Care Gap present. Most recent result on file    5. For patients aged 21-65: Has the patient had a pap smear? Yes - Care Gap present.  Most recent result on file

## 2022-12-05 NOTE — PROGRESS NOTES
HISTORY OF PRESENT ILLNESS  Keshia Mascorro is a 65 y.o. female. /72 (BP 1 Location: Left upper arm, BP Patient Position: Sitting)   Pulse 97   Temp 97.7 °F (36.5 °C) (Temporal)   Resp 16   Ht 5' 7\" (1.702 m)   Wt 249 lb (112.9 kg)   SpO2 97%   BMI 39.00 kg/m²             Current Outpatient Medications:   ·  ARIPiprazole (ABILIFY) 2 mg tablet, Take 2 mg by mouth daily. , Disp: , Rfl:   ·  OneTouch Ultra Test strip, use 1 TEST STRIP to TEST BLOOD SUGAR twice a day, Disp: , Rfl:   ·  fluticasone propionate (FLONASE) 50 mcg/actuation nasal spray, fluticasone propionate 50 mcg/actuation nasal spray,suspension  instill 1 spray into each nostril once daily, Disp: , Rfl:   ·  scopolamine (TRANSDERM-SCOP) 1 mg over 3 days pt3d, 1 Patch by TransDERmal route every seventy-two (72) hours. , Disp: 4 Patch, Rfl: 1  ·  tiZANidine (ZANAFLEX) 4 mg tablet, Take 1 Tablet by mouth nightly as needed for Muscle Spasm(s). Indications: muscle spasm, Disp: 30 Tablet, Rfl: 2  ·  lidocaine (LIDODERM) 5 %, Apply patch to the affected area for 12 hours a day and remove for 12 hours a day., Disp: 30 Each, Rfl: 5  ·  enalapril (VASOTEC) 20 mg tablet, take 1 tablet by mouth once daily, Disp: 90 Tablet, Rfl: 3  ·  levocetirizine (XYZAL) 5 mg tablet, Take 1 Tablet by mouth daily. Indications: inflammation of the nose due to an allergy, Disp: 30 Tablet, Rfl: 5  ·  pantoprazole (PROTONIX) 40 mg tablet, Take 1 Tablet by mouth Daily (before breakfast). , Disp: , Rfl:   ·  Linzess 290 mcg cap capsule, take 1 capsule by mouth once daily before breakfast, Disp: 30 Capsule, Rfl: 5  ·  dicyclomine (BENTYL) 10 mg capsule, Take 1 Capsule by mouth three (3) times daily as needed for Abdominal Cramps.  Indications: bowel spasm, Disp: 12 Capsule, Rfl: 0  ·  SUMAtriptan succinate 6 mg/0.5 mL kit, inject 1 dose (6 MG) subcutaneously for migraines, Disp: 3 Kit, Rfl: 5  ·  dulaglutide (Trulicity) 3 TG/7.1 mL pnij, 3 mg by SubCUTAneous route every seven (7) days. Indications: type 2 diabetes mellitus, Disp: 4 Adjustable Dose Pre-filled Pen Syringe, Rfl: 0  ·  metFORMIN (GLUCOPHAGE) 500 mg tablet, Take 1 Tablet by mouth daily (with lunch). , Disp: , Rfl:   ·  baclofen (LIORESAL) 10 mg tablet, take 1 tablet by mouth three times a day, Disp: , Rfl:   ·  polyethylene glycol (MIRALAX) 17 gram/dose powder, take 17GM (DISSOLVED IN WATER) by mouth once daily, Disp: , Rfl:   ·  estradioL (Estrace) 0.01 % (0.1 mg/gram) vaginal cream, INSERT 2 GRAM INTO VAGINA DAILY. APPLY FINGERTIP AMOUNT TO OUTSIDE OF VAGINAL OPENING, Disp: 42.5 g, Rfl: 11  ·  Nyamyc powder, APPLY A THIN LAYER UNDER BREASTS TWICE A DAY AS NEEDED FOR FLARE, Disp: 60 g, Rfl: 11  ·  cholecalciferol (VITAMIN D3) 25 mcg (1,000 unit) cap, Vitamin D3 25 mcg (1,000 unit) capsule  Take by oral route., Disp: , Rfl:   ·  camphor-menthoL (SARNA) 0.5-0.5 % lotion, Apply  to affected area as needed for Itching., Disp: , Rfl:   ·  diclofenac (VOLTAREN) 1 % gel, Apply 2 g to affected area four (4) times daily. , Disp: 100 g, Rfl: 5  ·  triamcinolone acetonide (KENALOG) 0.025 % topical cream, Apply  to affected area two (2) times a day. use thin layer, Disp: 15 g, Rfl: 5  ·  acetaminophen (TYLENOL EXTRA STRENGTH) 500 mg tablet, Take 1 Tab by mouth every six (6) hours as needed for Pain., Disp: 30 Tab, Rfl: 0  ·  cyanocobalamin 1,000 mcg tablet, Take 1 Tab by Mouth Once a Day. , Disp: , Rfl:   ·  Insulin Needles, Disposable, 31 gauge x 3/16\" ndle, BD Ultra-Fine Mini Pen Needle 31 gauge x 3/16\", Disp: , Rfl:   ·  lancets 30 gauge misc, OneTouch Delica Lancets 30 gauge, Disp: , Rfl:   ·  EVELIA PEN NEEDLE 32 gauge x 5/32\" ndle, inject once daily, Disp: , Rfl: 0  ·  carvedilol (COREG) 12.5 mg tablet, take 1 tablet by mouth twice a day, Disp: , Rfl: 0  ·  DULoxetine (CYMBALTA) 60 mg capsule, Take 120 mg by mouth daily. , Disp: , Rfl: 0  ·  hydrOXYzine (ATARAX) 50 mg tablet, , Disp: , Rfl: 0  ·  multivitamin (ONE A DAY) tablet, Take 1 Tab by mouth daily. , Disp: , Rfl:   ·  spironolactone (ALDACTONE) 25 mg tablet, Take 12.5 mg by mouth daily. , Disp: , Rfl:   ·  coenzyme q10-vitamin e 100-100 mg-unit cap, Take  by mouth two (2) times a day., Disp: , Rfl:   ·  clonazepam (KLONOPIN) 0.5 mg tablet, Take  by mouth two (2) times a day., Disp: , Rfl:   ·  rosuvastatin (CRESTOR) 40 mg tablet, Take 40 mg by mouth daily. NON FORMULARY , Disp: , Rfl:   ·  fenofibrate micronized (LOFIBRA) 134 mg capsule, Take  by mouth every morning., Disp: , Rfl:   ·  aspirin 81 mg chewable tablet, Take 81 mg by mouth daily. , Disp: , Rfl:   ·  CALCIUM CARBONATE/VITAMIN D3 (CALCIUM 600 + D,3, PO), Take 1 Cap by mouth daily. , Disp: , Rfl:       Back Pain   The history is provided by the Patient (states the tizanidine is not helping her back). This is a chronic problem. The current episode started more than 1 week ago. Pertinent negatives include no chest pain and no fever. Hypertension   The history is provided by the Patient. This is a chronic problem. Pertinent negatives include no chest pain, no palpitations and no shortness of breath. Diabetes  The history is provided by the Patient (followed at [facility]. Ms. Davida Salgado). This is a chronic problem. Pertinent negatives include no chest pain and no shortness of breath. Review of Systems   Constitutional:  Negative for chills and fever. Respiratory:  Negative for shortness of breath. Cardiovascular:  Negative for chest pain and palpitations. Musculoskeletal:  Positive for back pain and joint pain (foot pain bilaterally). My back and legs and feet hurt \"all of the time\"  Has been to the [facility] but they wanted a lot of money for custom inserts. Neurological:         Neg SLR from sitting position. Physical Exam  Vitals and nursing note reviewed. Constitutional:       Appearance: Normal appearance. She is well-developed. HENT:      Head: Normocephalic and atraumatic.    Neck: Cardiovascular:      Rate and Rhythm: Normal rate and regular rhythm. Pulmonary:      Effort: Pulmonary effort is normal.      Breath sounds: Normal breath sounds. Musculoskeletal:      Right lower leg: No edema. Left lower leg: No edema. Skin:     General: Skin is warm and dry. Neurological:      General: No focal deficit present. Mental Status: She is alert and oriented to person, place, and time. Psychiatric:         Mood and Affect: Mood normal.         Behavior: Behavior normal.       ASSESSMENT and PLAN    ICD-10-CM ICD-9-CM    1. Midline low back pain, unspecified chronicity, unspecified whether sciatica present  M54.50 724.2 methocarbamoL (ROBAXIN) 750 mg tablet      XR SPINE LUMB COMP W BEND      REFERRAL TO ORTHOPEDICS      2. Type 2 diabetes mellitus with nephropathy (HCC)  E11.21 250.40      583.81       3. Primary hypertension  I10 401.9       4. Pain in both feet  M79.671 729.5 REFERRAL TO ORTHOPEDICS    M79.672          Will update low back xrays. 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 Justino Haque for an evaluation  Refer to Dr. Ana Horner for foot pain  I have advised pt that this is mostly wear and tear. She needs to do a home exercise program as well. She should make every effort to lose weight.   Request records from [facility] endocrinology  F/u 6 months for [facility]

## 2022-12-06 ENCOUNTER — TELEPHONE (OUTPATIENT)
Dept: INTERNAL MEDICINE CLINIC | Age: 62
End: 2022-12-06

## 2022-12-06 NOTE — TELEPHONE ENCOUNTER
Fax sent for OV notes to    [facility] [place]    [address] [place] [address],  [place]    [phone] (Work)    [phone] (Fax)

## 2023-01-17 ENCOUNTER — OFFICE VISIT (OUTPATIENT)
Dept: ORTHOPEDIC SURGERY | Age: 63
End: 2023-01-17
Payer: MEDICARE

## 2023-01-17 DIAGNOSIS — G89.29 CHRONIC BILATERAL LOW BACK PAIN WITH BILATERAL SCIATICA: Primary | ICD-10-CM

## 2023-01-17 DIAGNOSIS — M54.42 CHRONIC BILATERAL LOW BACK PAIN WITH BILATERAL SCIATICA: Primary | ICD-10-CM

## 2023-01-17 DIAGNOSIS — M54.41 CHRONIC BILATERAL LOW BACK PAIN WITH BILATERAL SCIATICA: Primary | ICD-10-CM

## 2023-01-17 PROCEDURE — G9717 DOC PT DX DEP/BP F/U NT REQ: HCPCS | Performed by: PHYSICAL MEDICINE & REHABILITATION

## 2023-01-17 PROCEDURE — G8417 CALC BMI ABV UP PARAM F/U: HCPCS | Performed by: PHYSICAL MEDICINE & REHABILITATION

## 2023-01-17 PROCEDURE — 3017F COLORECTAL CA SCREEN DOC REV: CPT | Performed by: PHYSICAL MEDICINE & REHABILITATION

## 2023-01-17 PROCEDURE — G8427 DOCREV CUR MEDS BY ELIG CLIN: HCPCS | Performed by: PHYSICAL MEDICINE & REHABILITATION

## 2023-01-17 PROCEDURE — 99203 OFFICE O/P NEW LOW 30 MIN: CPT | Performed by: PHYSICAL MEDICINE & REHABILITATION

## 2023-01-17 NOTE — PROGRESS NOTES
[address]  [address]  [address]  Phone: [phone]  Fax: [phone]      Patient: Nishant Beauchamp                                                                              MRN: [id number]        YOB: 1960          AGE: 65 y.o. PCP: Shoaib Nazario MD  Date:  01/17/23    Reason for Consultation: low back pain      HPI:  Nishant Beauchamp is a 65 y.o. female with relevant PMH of DM, HTN,  who presents with low back pain. The pain began around 2022. Denies any precipitating incident or trauma. Neurologic symptoms: No numbness, tingling, weakness, bowel or bladder changes. No recent falls      Location: The pain is located in the low back    Radiation: The pain does radiate posterior thigh bilaterally . Pain Score: Currently: 5/10  Quality: Pain is of a Achy, Stiff, and Dull quality. Aggravating: Pain is exacerbated by walking and exercise  Alleviating: The pain is alleviated by lying down    Prior Treatments:  Physical therapy: NO  Injections:NO    Previous Medications: tizanidine, baclofen  Current Medications:methocarbamol 750mg, cymbalta 60mg   Previous work-up has included:   X-ray lumbar spine 12/5/2022  Vertebral body heights preserved. L5-S1 mild disc space loss. Multilevel minimal  endplate spurring. Grade 1 L4-L5 anterolisthesis measuring roughly 4 mm during  flexion and roughly 4 mm during extension. Notable lower lumbar facet  arthropathy. No definite pars defects identified within limits of exam. No  definite acute fracture identified. Past Medical History:   Past Medical History:   Diagnosis Date    Abnormal bone density screening 03/10/2011    Anemia 1996    Arthritis     Baker's cyst     left    Cardiomyopathy (HCC)     Chicken pox     Constipation     Constipation due to pain medication     COVID-19 08/02/2022    CTS (carpal tunnel syndrome) 1999    bilat. Depression 2000    Diabetes ([facility] [address]     Diabetes mellitus     type 2 diabetes mellitus without complication, without long-term current use of insulin. type 2 diabetes mellitus with nephropathy     Disorder of urinary tract     Dysfunctional voiding of urine     Dyslipidemia     Essential hypertension     Fatigue     multifactorial    Fibromyalgia     GERD (gastroesophageal reflux disease)     H/O bone density study 2017    osteopenia, SEE MEDIA    Hematuria     History of meniscal tear     HTN (hypertension)     Incontinence     Metacarpal bone fracture     left 5th    Migraine     aura without headache    Nephropathy     due to diabetes? OAB (overactive bladder)     documented on UDS     Obesity     Osteopenia 2006    Plantar fasciitis 1996    Pre-syncope 2016    Primary osteoarthritis of left knee     Renal insufficiency     PER DR. JARAD WARREN    Restless leg     Severe obesity (BMI 35.0-39. 9) with comorbidity ([facility] [address]     Sleep apnea     C-PAP    Type II or unspecified type diabetes mellitus with neurological manifestations, uncontrolled(250.62) (HCC)     Unspecified hereditary and idiopathic peripheral neuropathy     Urge incontinence     Urinary incontinence     unspec.      Urinary tract infection, site not specified     Vitamin D deficiency       Past Surgical History:   Past Surgical History:   Procedure Laterality Date    CARDIAC CATHETERIZATION  2006    EGD  2008    HX ARTHROTOMY Left 2015    upper arm     HX CARPAL TUNNEL RELEASE Left 2016    wrist     HX  SECTION      x2    HX CHOLECYSTECTOMY      HX COLONOSCOPY  2014    HX ENDOSCOPY      ENDOSCOPY,COLON, DIAGNOSTIC    HX KNEE ARTHROSCOPY Left 2018    HX KNEE REPLACEMENT Left 2018    HX OTHER SURGICAL Left 2015    SHOULDER REPAIR    HX UROLOGICAL  2021    CYSTOSCOPY BLADDER BOTOX INJECTION 200 UNITS; Dr. Luh Andersen UROLOGICAL  2022    CYSTOSCOPY WITH BLADDER BOTOX INJECTIONS 200 UNITS; Dr. Mich Baptiste      SocHx:   Social History     Tobacco Use    Smoking status: Never    Smokeless tobacco: Never   Substance Use Topics    Alcohol use: No      FamHx:? Family History   Problem Relation Age of Onset    Hypertension Mother     Heart Disease Mother     Kidney Disease Mother     Heart Disease Father     Emphysema Father        Current Medications:    Current Outpatient Medications   Medication Sig Dispense Refill    ARIPiprazole (ABILIFY) 2 mg tablet Take 2 mg by mouth daily. OneTouch Ultra Test strip use 1 TEST STRIP to TEST BLOOD SUGAR twice a day      fluticasone propionate (FLONASE) 50 mcg/actuation nasal spray fluticasone propionate 50 mcg/actuation nasal spray,suspension   instill 1 spray into each nostril once daily      methocarbamoL (ROBAXIN) 750 mg tablet Take 1 Tablet by mouth three (3) times daily. Indications: muscle spasm 60 Tablet 1    scopolamine (TRANSDERM-SCOP) 1 mg over 3 days pt3d 1 Patch by TransDERmal route every seventy-two (72) hours. 4 Patch 1    lidocaine (LIDODERM) 5 % Apply patch to the affected area for 12 hours a day and remove for 12 hours a day. 30 Each 5    enalapril (VASOTEC) 20 mg tablet take 1 tablet by mouth once daily 90 Tablet 3    levocetirizine (XYZAL) 5 mg tablet Take 1 Tablet by mouth daily. Indications: inflammation of the nose due to an allergy 30 Tablet 5    pantoprazole (PROTONIX) 40 mg tablet Take 1 Tablet by mouth Daily (before breakfast). Linzess 290 mcg cap capsule take 1 capsule by mouth once daily before breakfast 30 Capsule 5    dicyclomine (BENTYL) 10 mg capsule Take 1 Capsule by mouth three (3) times daily as needed for Abdominal Cramps. Indications: bowel spasm 12 Capsule 0    SUMAtriptan succinate 6 mg/0.5 mL kit inject 1 dose (6 MG) subcutaneously for migraines 3 Kit 5    dulaglutide (Trulicity) 3 DANIEL/2.1 mL pnij 3 mg by SubCUTAneous route every seven (7) days.  Indications: type 2 diabetes mellitus 4 Adjustable Dose Pre-filled Pen Syringe 0    metFORMIN (GLUCOPHAGE) 500 mg tablet Take 1 Tablet by mouth daily (with lunch). polyethylene glycol (MIRALAX) 17 gram/dose powder take 17GM (DISSOLVED IN WATER) by mouth once daily      estradioL (Estrace) 0.01 % (0.1 mg/gram) vaginal cream INSERT 2 GRAM INTO VAGINA DAILY. APPLY FINGERTIP AMOUNT TO OUTSIDE OF VAGINAL OPENING 42.5 g 11    Nyamyc powder APPLY A THIN LAYER UNDER BREASTS TWICE A DAY AS NEEDED FOR FLARE 60 g 11    cholecalciferol (VITAMIN D3) 25 mcg (1,000 unit) cap Vitamin D3 25 mcg (1,000 unit) capsule   Take by oral route. camphor-menthoL (SARNA) 0.5-0.5 % lotion Apply  to affected area as needed for Itching. diclofenac (VOLTAREN) 1 % gel Apply 2 g to affected area four (4) times daily. 100 g 5    triamcinolone acetonide (KENALOG) 0.025 % topical cream Apply  to affected area two (2) times a day. use thin layer 15 g 5    acetaminophen (TYLENOL EXTRA STRENGTH) 500 mg tablet Take 1 Tab by mouth every six (6) hours as needed for Pain. 30 Tab 0    cyanocobalamin 1,000 mcg tablet Take 1 Tab by Mouth Once a Day. Insulin Needles, Disposable, 31 gauge x 3/16\" ndle BD Ultra-Fine Mini Pen Needle 31 gauge x 3/16\"      lancets 30 gauge misc OneTouch Delica Lancets 30 gauge      EVELIA PEN NEEDLE 32 gauge x 5/32\" ndle inject once daily  0    carvedilol (COREG) 12.5 mg tablet take 1 tablet by mouth twice a day  0    DULoxetine (CYMBALTA) 60 mg capsule Take 120 mg by mouth daily. 0    hydrOXYzine (ATARAX) 50 mg tablet   0    multivitamin (ONE A DAY) tablet Take 1 Tab by mouth daily. spironolactone (ALDACTONE) 25 mg tablet Take 12.5 mg by mouth daily. coenzyme q10-vitamin e 100-100 mg-unit cap Take  by mouth two (2) times a day. clonazepam (KLONOPIN) 0.5 mg tablet Take  by mouth two (2) times a day. rosuvastatin (CRESTOR) 40 mg tablet Take 40 mg by mouth daily.  NON FORMULARY       fenofibrate micronized (LOFIBRA) 134 mg capsule Take  by mouth every morning. aspirin 81 mg chewable tablet Take 81 mg by mouth daily. CALCIUM CARBONATE/VITAMIN D3 (CALCIUM 600 + D,3, PO) Take 1 Cap by mouth daily. Allergies: Allergies   Allergen Reactions    Digoxin Nausea Only    Niaspan [Niacin] Rash    Wellbutrin [Bupropion Hcl] Itching        Review of Systems:   Gen:    Denied fevers, chills, malaise, fatigue, weight changes   Resp: Denied shortness of breath, cough, wheezing   CVS: Denied chest pain, palpitations   : Denied urinary urgency, frequency, incontinence   GI: Denied nausea, vomiting, constipation, diarrhea   Skin: Denied rashes, wounds   Psych: Denied anxiety, depression   Vasc: Denied claudication, ulcers   Hem: Denied easy bruising/bleeding   MSK: See HPI   Neuro: See HPI         Physical Exam     Vital Signs: There were no vitals taken for this visit. General: ??????? Well nourished and well developed female without any acute distress   Psychiatric: ?  Alert and oriented x 3 with normal mood    HEENT: ???????? Atraumatic   Respiratory:   Breathing non-labored and non dyspneic   CV: ???????????????? Peripheral pulses intact, no peripheral edema   Skin: ????????????? No rashes       Neurologic: ?? Sensation: normal and grossly intact thebilateral, lower extremity(s)   Strength: 5/5 in the bilateral, lower extremity(s)  Reflexes: reveals 2+ symmetric DTRs throughout LE  Gait: normal     Musculoskeletal: Lumbar Exam     Inspection:   Alignment: Normal  Atrophy: None       Tenderness to Palpation:   Lumbar paraspinals Positive  Lumbar spinous processes Negative  SI Joint:  Negative  Gluteal:Positive   Greater trochanter: Negative      ROM:   Lumbar ROM: Abnormal pain with flexion and extension  Lumbar facet loading: Negative  Hip ROM: No reproduction of pain with movement     Special Tests      Slump test: Negative  SLR: Negative  ALEIDA: Negative  FADIR: Negative  Log Roll: Negative      Medical Decision Making:    Images:  The imaging results as well as the actual images of the studies below were reviewed, visualized and interpreted by me. Labs: The results below were reviewed. X-ray lumbar spine 12/5/2022  Vertebral body heights preserved. L5-S1 mild disc space loss. Multilevel minimal  endplate spurring. Grade 1 L4-L5 anterolisthesis measuring roughly 4 mm during  flexion and roughly 4 mm during extension. Notable lower lumbar facet  arthropathy. No definite pars defects identified within limits of exam. No  definite acute fracture identified. Assessment:   - low back pain radiating down bilateral legs    Plan:      -Physical therapy -  referral to PT   -Medications - continue methocarbamol as needed. Counseled regarding side effects and appropriate administration of medications.    -Diagnostics/Imaging - x-ray reviewed   -Injections - NA   -Lifestyle - Discussed activities to avoid   -Education - The patient's diagnosis, prognosis and treatment options were discussed today. All questions were answered. F/U - in 8 week(s) or sooner if needed.   Consider MRI lumbar spine          Natalie Mccracken [facility]

## 2023-01-17 NOTE — Clinical Note
Referral to [facility] [address] [facility], [address] [phone]  thanks [[email or web address]] : The pt has h/o Prostate cancer. He received radiation. He got up at night on 13 to urinate and had syncope. In hospital was found +DVT. CT scan not adequate to determine if + PE. He was found to have a second degree AV block at times Mobitz II. A [employer] DDD was placed.\par Xarelto was stopped by pulmonary after 1 yr.  His brother  Dec 2015. Last echo 10/21 EF 55%. .  In hosp [facility] . Had chest pain .Neg w/u. Sold house [place]. He got 3 covid vaccine. Told walk Anxious 2 sons not talk to him. Pacemaker changed 2022. Pacemaker checked 22

## 2023-03-21 LAB — HBA1C MFR BLD HPLC: 6.2 %

## 2023-04-18 ENCOUNTER — OFFICE VISIT (OUTPATIENT)
Age: 63
End: 2023-04-18

## 2023-04-18 DIAGNOSIS — E66.01 OBESITY, CLASS III, BMI 40-49.9 (MORBID OBESITY) (HCC): ICD-10-CM

## 2023-04-18 DIAGNOSIS — G89.29 CHRONIC BILATERAL LOW BACK PAIN WITHOUT SCIATICA: Primary | ICD-10-CM

## 2023-04-18 DIAGNOSIS — M54.50 CHRONIC BILATERAL LOW BACK PAIN WITHOUT SCIATICA: Primary | ICD-10-CM

## 2023-04-18 PROCEDURE — 99213 OFFICE O/P EST LOW 20 MIN: CPT | Performed by: PHYSICAL MEDICINE & REHABILITATION

## 2023-04-18 NOTE — PROGRESS NOTES
[address]  [address], [address]   [address]   Phone: [phone]   Fax: [phone]         Patient: Lucille Isaac                                                                               MRN: [id number]         YOB: 1960           AGE: 65 y.o. PCP: Thalia Maza MD       Reason for Consultation: low back pain         HPI:   Lucille Isaac is a 65 y.o. female with relevant PMH of DM, HTN,  who presented with low back pain. The pain began around 2022. Denies any precipitating incident or trauma. Since her last visit she has completed  Pt and her back pain has improved. Neurologic symptoms: No numbness, tingling, weakness, bowel or bladder changes. No recent falls        Location: The pain is located in the low back     Radiation: The pain does radiate posterior thigh bilaterally . Pain Score: Currently: 0/10   Quality: Pain is of a Achy, Stiff, and Dull quality. Aggravating: Pain is exacerbated by walking and exercise   Alleviating: The pain is alleviated by lying down      Prior Treatments:   Physical therapy: completed PT  3/29/2023   Injections:NO      Previous Medications: tizanidine, baclofen   Current Medications:methocarbamol 750mg, cymbalta 60mg    Previous work-up has included:    X-ray lumbar spine 12/5/2022   Vertebral body heights preserved. L5-S1 mild disc space loss. Multilevel minimal   endplate spurring. Grade 1 L4-L5 anterolisthesis measuring roughly 4 mm during   flexion and roughly 4 mm during extension. Notable lower lumbar facet   arthropathy. No definite pars defects identified within limits of exam. No   definite acute fracture identified.    Past Medical History:      Past Medical History:   Diagnosis Date    Abnormal bone density screening 03/10/2011    Anemia 1996    Arthritis     Baker's cyst     left    Cardiomyopathy (HCC)     Chicken pox

## 2023-06-05 ENCOUNTER — OFFICE VISIT (OUTPATIENT)
Facility: CLINIC | Age: 63
End: 2023-06-05
Payer: MEDICARE

## 2023-06-05 ENCOUNTER — HOSPITAL ENCOUNTER (OUTPATIENT)
Facility: HOSPITAL | Age: 63
Setting detail: SPECIMEN
Discharge: HOME | End: 2023-06-08
Payer: MEDICARE

## 2023-06-05 VITALS
OXYGEN SATURATION: 96 % | HEART RATE: 87 BPM | TEMPERATURE: 97.7 F | RESPIRATION RATE: 15 BRPM | DIASTOLIC BLOOD PRESSURE: 60 MMHG | WEIGHT: 247 LBS | SYSTOLIC BLOOD PRESSURE: 96 MMHG | BODY MASS INDEX: 38.77 KG/M2 | HEIGHT: 67 IN

## 2023-06-05 DIAGNOSIS — N18.31 CHRONIC KIDNEY DISEASE, STAGE 3A (HCC): ICD-10-CM

## 2023-06-05 DIAGNOSIS — E78.5 DYSLIPIDEMIA: ICD-10-CM

## 2023-06-05 DIAGNOSIS — Z11.4 SCREENING FOR HIV (HUMAN IMMUNODEFICIENCY VIRUS): ICD-10-CM

## 2023-06-05 DIAGNOSIS — E11.21 TYPE 2 DIABETES MELLITUS WITH DIABETIC NEPHROPATHY, WITHOUT LONG-TERM CURRENT USE OF INSULIN (HCC): ICD-10-CM

## 2023-06-05 DIAGNOSIS — N18.31 STAGE 3A CHRONIC KIDNEY DISEASE (HCC): ICD-10-CM

## 2023-06-05 DIAGNOSIS — R68.89 OTHER GENERAL SYMPTOMS AND SIGNS: ICD-10-CM

## 2023-06-05 DIAGNOSIS — I73.9 PERIPHERAL VASCULAR DISEASE, UNSPECIFIED (HCC): ICD-10-CM

## 2023-06-05 DIAGNOSIS — Z00.00 MEDICARE ANNUAL WELLNESS VISIT, SUBSEQUENT: Primary | ICD-10-CM

## 2023-06-05 DIAGNOSIS — E55.9 VITAMIN D DEFICIENCY: ICD-10-CM

## 2023-06-05 DIAGNOSIS — R53.83 OTHER FATIGUE: ICD-10-CM

## 2023-06-05 DIAGNOSIS — N39.41 URGE INCONTINENCE: ICD-10-CM

## 2023-06-05 LAB
25(OH)D3 SERPL-MCNC: 22.6 NG/ML (ref 30–100)
ALBUMIN SERPL-MCNC: 3.9 G/DL (ref 3.4–5)
ALBUMIN/GLOB SERPL: 1.1 (ref 0.8–1.7)
ALP SERPL-CCNC: 42 U/L (ref 45–117)
ALT SERPL-CCNC: 21 U/L (ref 13–56)
AMORPH CRY URNS QL MICRO: ABNORMAL
ANION GAP SERPL CALC-SCNC: 4 MMOL/L (ref 3–18)
APPEARANCE UR: ABNORMAL
AST SERPL-CCNC: 16 U/L (ref 10–38)
BACTERIA URNS QL MICRO: ABNORMAL /HPF
BILIRUB SERPL-MCNC: 0.3 MG/DL (ref 0.2–1)
BILIRUB UR QL: NEGATIVE
BUN SERPL-MCNC: 23 MG/DL (ref 7–18)
BUN/CREAT SERPL: 19 (ref 12–20)
CALCIUM SERPL-MCNC: 10.1 MG/DL (ref 8.5–10.1)
CHLORIDE SERPL-SCNC: 108 MMOL/L (ref 100–111)
CHOLEST SERPL-MCNC: 101 MG/DL
CO2 SERPL-SCNC: 29 MMOL/L (ref 21–32)
COLOR UR: YELLOW
CREAT SERPL-MCNC: 1.18 MG/DL (ref 0.6–1.3)
CREAT UR-MCNC: 143 MG/DL (ref 30–125)
EPITH CASTS URNS QL MICRO: ABNORMAL /LPF (ref 0–5)
EST. AVERAGE GLUCOSE BLD GHB EST-MCNC: 117 MG/DL
GLOBULIN SER CALC-MCNC: 3.5 G/DL (ref 2–4)
GLUCOSE SERPL-MCNC: 119 MG/DL (ref 74–99)
GLUCOSE UR STRIP.AUTO-MCNC: NEGATIVE MG/DL
HBA1C MFR BLD: 5.7 % (ref 4.2–5.6)
HDLC SERPL-MCNC: 63 MG/DL (ref 40–60)
HDLC SERPL: 1.6 (ref 0–5)
HGB UR QL STRIP: NEGATIVE
KETONES UR QL STRIP.AUTO: NEGATIVE MG/DL
LDLC SERPL CALC-MCNC: 19.8 MG/DL (ref 0–100)
LEUKOCYTE ESTERASE UR QL STRIP.AUTO: NEGATIVE
LIPID PANEL: ABNORMAL
MAGNESIUM SERPL-MCNC: 2.1 MG/DL (ref 1.6–2.6)
MICROALBUMIN UR-MCNC: 0.72 MG/DL (ref 0–3)
MICROALBUMIN/CREAT UR-RTO: 5 MG/G (ref 0–30)
NITRITE UR QL STRIP.AUTO: POSITIVE
PH UR STRIP: >8.5 [PH] (ref 5–8)
POTASSIUM SERPL-SCNC: 4.4 MMOL/L (ref 3.5–5.5)
PROT SERPL-MCNC: 7.4 G/DL (ref 6.4–8.2)
PROT UR STRIP-MCNC: NEGATIVE MG/DL
RBC #/AREA URNS HPF: ABNORMAL /HPF (ref 0–5)
SODIUM SERPL-SCNC: 141 MMOL/L (ref 136–145)
SP GR UR REFRACTOMETRY: 1.02 (ref 1–1.03)
TRI-PHOS CRY URNS QL MICRO: ABNORMAL
TRIGL SERPL-MCNC: 91 MG/DL
UROBILINOGEN UR QL STRIP.AUTO: 1 EU/DL (ref 0.2–1)
VLDLC SERPL CALC-MCNC: 18.2 MG/DL
WBC URNS QL MICRO: ABNORMAL /HPF (ref 0–4)

## 2023-06-05 PROCEDURE — 83735 ASSAY OF MAGNESIUM: CPT

## 2023-06-05 PROCEDURE — 82570 ASSAY OF URINE CREATININE: CPT

## 2023-06-05 PROCEDURE — 80061 LIPID PANEL: CPT

## 2023-06-05 PROCEDURE — 81001 URINALYSIS AUTO W/SCOPE: CPT

## 2023-06-05 PROCEDURE — 82306 VITAMIN D 25 HYDROXY: CPT

## 2023-06-05 PROCEDURE — 99214 OFFICE O/P EST MOD 30 MIN: CPT | Performed by: INTERNAL MEDICINE

## 2023-06-05 PROCEDURE — 3046F HEMOGLOBIN A1C LEVEL >9.0%: CPT | Performed by: INTERNAL MEDICINE

## 2023-06-05 PROCEDURE — 82607 VITAMIN B-12: CPT

## 2023-06-05 PROCEDURE — G0439 PPPS, SUBSEQ VISIT: HCPCS | Performed by: INTERNAL MEDICINE

## 2023-06-05 PROCEDURE — 3017F COLORECTAL CA SCREEN DOC REV: CPT | Performed by: INTERNAL MEDICINE

## 2023-06-05 PROCEDURE — 80053 COMPREHEN METABOLIC PANEL: CPT

## 2023-06-05 PROCEDURE — 3074F SYST BP LT 130 MM HG: CPT | Performed by: INTERNAL MEDICINE

## 2023-06-05 PROCEDURE — G8427 DOCREV CUR MEDS BY ELIG CLIN: HCPCS | Performed by: INTERNAL MEDICINE

## 2023-06-05 PROCEDURE — 82043 UR ALBUMIN QUANTITATIVE: CPT

## 2023-06-05 PROCEDURE — 2022F DILAT RTA XM EVC RTNOPTHY: CPT | Performed by: INTERNAL MEDICINE

## 2023-06-05 PROCEDURE — 3078F DIAST BP <80 MM HG: CPT | Performed by: INTERNAL MEDICINE

## 2023-06-05 PROCEDURE — 36415 COLL VENOUS BLD VENIPUNCTURE: CPT

## 2023-06-05 PROCEDURE — G8417 CALC BMI ABV UP PARAM F/U: HCPCS | Performed by: INTERNAL MEDICINE

## 2023-06-05 PROCEDURE — 1036F TOBACCO NON-USER: CPT | Performed by: INTERNAL MEDICINE

## 2023-06-05 PROCEDURE — 83036 HEMOGLOBIN GLYCOSYLATED A1C: CPT

## 2023-06-05 SDOH — ECONOMIC STABILITY: FOOD INSECURITY: WITHIN THE PAST 12 MONTHS, YOU WORRIED THAT YOUR FOOD WOULD RUN OUT BEFORE YOU GOT MONEY TO BUY MORE.: NEVER TRUE

## 2023-06-05 SDOH — ECONOMIC STABILITY: INCOME INSECURITY: HOW HARD IS IT FOR YOU TO PAY FOR THE VERY BASICS LIKE FOOD, HOUSING, MEDICAL CARE, AND HEATING?: NOT HARD AT ALL

## 2023-06-05 SDOH — ECONOMIC STABILITY: HOUSING INSECURITY
IN THE LAST 12 MONTHS, WAS THERE A TIME WHEN YOU DID NOT HAVE A STEADY PLACE TO SLEEP OR SLEPT IN A SHELTER (INCLUDING NOW)?: NO

## 2023-06-05 SDOH — ECONOMIC STABILITY: FOOD INSECURITY: WITHIN THE PAST 12 MONTHS, THE FOOD YOU BOUGHT JUST DIDN'T LAST AND YOU DIDN'T HAVE MONEY TO GET MORE.: NEVER TRUE

## 2023-06-05 ASSESSMENT — LIFESTYLE VARIABLES
HOW MANY STANDARD DRINKS CONTAINING ALCOHOL DO YOU HAVE ON A TYPICAL DAY: PATIENT DOES NOT DRINK
HOW OFTEN DO YOU HAVE A DRINK CONTAINING ALCOHOL: NEVER

## 2023-06-05 ASSESSMENT — PATIENT HEALTH QUESTIONNAIRE - PHQ9
SUM OF ALL RESPONSES TO PHQ QUESTIONS 1-9: 3
SUM OF ALL RESPONSES TO PHQ QUESTIONS 1-9: 3
6. FEELING BAD ABOUT YOURSELF - OR THAT YOU ARE A FAILURE OR HAVE LET YOURSELF OR YOUR FAMILY DOWN: 0
3. TROUBLE FALLING OR STAYING ASLEEP: 0
2. FEELING DOWN, DEPRESSED OR HOPELESS: 1
1. LITTLE INTEREST OR PLEASURE IN DOING THINGS: 0
SUM OF ALL RESPONSES TO PHQ QUESTIONS 1-9: 3
SUM OF ALL RESPONSES TO PHQ QUESTIONS 1-9: 3
SUM OF ALL RESPONSES TO PHQ9 QUESTIONS 1 & 2: 1
8. MOVING OR SPEAKING SO SLOWLY THAT OTHER PEOPLE COULD HAVE NOTICED. OR THE OPPOSITE, BEING SO FIGETY OR RESTLESS THAT YOU HAVE BEEN MOVING AROUND A LOT MORE THAN USUAL: 0
5. POOR APPETITE OR OVEREATING: 1
10. IF YOU CHECKED OFF ANY PROBLEMS, HOW DIFFICULT HAVE THESE PROBLEMS MADE IT FOR YOU TO DO YOUR WORK, TAKE CARE OF THINGS AT HOME, OR GET ALONG WITH OTHER PEOPLE: 1
4. FEELING TIRED OR HAVING LITTLE ENERGY: 0
9. THOUGHTS THAT YOU WOULD BE BETTER OFF DEAD, OR OF HURTING YOURSELF: 0
7. TROUBLE CONCENTRATING ON THINGS, SUCH AS READING THE NEWSPAPER OR WATCHING TELEVISION: 1

## 2023-06-05 ASSESSMENT — ENCOUNTER SYMPTOMS
NAUSEA: 0
ABDOMINAL PAIN: 0

## 2023-06-05 NOTE — PROGRESS NOTES
HISTORY OF PRESENT ILLNESS      Helio Velez is a 65 y.o. female. BP 96/60 (Site: Left Upper Arm, Position: Sitting, Cuff Size: Large Adult) Comment: w/ meds  Pulse 87   Temp 97.7 °F (36.5 °C) (Temporal)   Resp 15   Ht 5' 7\" (1.702 m)   Wt 247 lb (112 kg)   SpO2 96%   BMI 38.69 kg/m²         States she has no \"get up and go\"  \"I have no drive. \"    Does not \"want\" to get out of bed. Just has no energy to do anything. Current Outpatient Medications:   ·  L-methylfolate Calcium 15 MG TABS, Take 1 tablet by mouth every morning, Disp: , Rfl:   ·  solifenacin (VESICARE) 10 MG tablet, Take 1 tablet by mouth daily, Disp: 30 tablet, Rfl: 3  ·  acetaminophen (TYLENOL) 500 MG tablet, Take 1 tablet by mouth every 6 hours as needed, Disp: , Rfl:   ·  ARIPiprazole (ABILIFY) 2 MG tablet, Take 1 tablet by mouth daily, Disp: , Rfl:   ·  aspirin 81 MG chewable tablet, Take 1 tablet by mouth daily, Disp: , Rfl:   ·  carvedilol (COREG) 12.5 MG tablet, take 1 tablet by mouth twice a day, Disp: , Rfl:   ·  clonazePAM (KLONOPIN) 0.5 MG tablet, Take by mouth 2 times daily. , Disp: , Rfl:   ·  cyanocobalamin 1000 MCG tablet, Take 1 Tab by Mouth Once a Day. , Disp: , Rfl:   ·  Dulaglutide (TRULICITY) 3 IP/5.7DE SOPN, Inject 3 mg into the skin every 7 days, Disp: , Rfl:   ·  DULoxetine (CYMBALTA) 60 MG extended release capsule, Take 2 capsules by mouth daily, Disp: , Rfl:   ·  enalapril (VASOTEC) 10 MG tablet, Take 1 tablet by mouth daily, Disp: , Rfl:   ·  estradiol (ESTRACE) 0.1 MG/GM vaginal cream, INSERT 2 GRAM INTO VAGINA DAILY.  APPLY FINGERTIP AMOUNT TO OUTSIDE OF VAGINAL OPENING, Disp: , Rfl:   ·  fenofibrate micronized (LOFIBRA) 134 MG capsule, Take by mouth, Disp: , Rfl:   ·  fluticasone (FLONASE) 50 MCG/ACT nasal spray, fluticasone propionate 50 mcg/actuation nasal spray,suspension  instill 1 spray into each nostril once daily, Disp: , Rfl:   ·  hydrOXYzine HCl (ATARAX) 50 MG tablet, ceived the following

## 2023-06-06 LAB
INTERPRETATION: NORMAL
VIT B12 SERPL-MCNC: 878 PG/ML (ref 232–1245)

## 2023-06-11 DIAGNOSIS — R82.71 BACTERIURIA: Primary | ICD-10-CM

## 2023-06-26 ENCOUNTER — NURSE ONLY (OUTPATIENT)
Facility: CLINIC | Age: 63
End: 2023-06-26

## 2023-06-26 DIAGNOSIS — R82.71 BACTERIURIA: ICD-10-CM

## 2023-06-26 DIAGNOSIS — N30.00 ACUTE CYSTITIS WITHOUT HEMATURIA: Primary | ICD-10-CM

## 2023-06-26 DIAGNOSIS — Z11.4 SCREENING FOR HIV (HUMAN IMMUNODEFICIENCY VIRUS): Primary | ICD-10-CM

## 2023-06-27 LAB
HIV 1+2 AB+HIV1 P24 AG SERPL QL IA: NON REACTIVE
SPECIMEN STATUS REPORT: NORMAL

## 2023-06-28 LAB
APPEARANCE UR: ABNORMAL
BACTERIA #/AREA URNS HPF: ABNORMAL /[HPF]
BILIRUB UR QL STRIP: NEGATIVE
CASTS URNS QL MICRO: ABNORMAL /LPF
COLOR UR: YELLOW
EPI CELLS #/AREA URNS HPF: ABNORMAL /HPF (ref 0–10)
GLUCOSE UR QL STRIP: ABNORMAL
HGB UR QL STRIP: NEGATIVE
KETONES UR QL STRIP: NEGATIVE
LEUKOCYTE ESTERASE UR QL STRIP: NEGATIVE
MICRO URNS: ABNORMAL
NITRITE UR QL STRIP: POSITIVE
PH UR STRIP: >=9 [PH] (ref 5–7.5)
PROT UR QL STRIP: ABNORMAL
RBC #/AREA URNS HPF: ABNORMAL /HPF (ref 0–2)
SP GR UR STRIP: 1.02 (ref 1–1.03)
UROBILINOGEN UR STRIP-MCNC: 0.2 MG/DL (ref 0.2–1)
WBC #/AREA URNS HPF: ABNORMAL /HPF (ref 0–5)

## 2023-06-28 RX ORDER — NITROFURANTOIN 25; 75 MG/1; MG/1
100 CAPSULE ORAL 2 TIMES DAILY
Qty: 14 CAPSULE | Refills: 0 | Status: SHIPPED | OUTPATIENT
Start: 2023-06-28 | End: 2023-07-05

## 2023-06-29 LAB — BACTERIA UR CULT: ABNORMAL

## 2023-06-30 RX ORDER — AMOXICILLIN 500 MG/1
500 CAPSULE ORAL 2 TIMES DAILY
Qty: 14 CAPSULE | Refills: 0 | Status: SHIPPED | OUTPATIENT
Start: 2023-06-30 | End: 2023-07-07

## 2023-07-11 ENCOUNTER — OFFICE VISIT (OUTPATIENT)
Facility: CLINIC | Age: 63
End: 2023-07-11
Payer: MEDICARE

## 2023-07-11 VITALS
DIASTOLIC BLOOD PRESSURE: 73 MMHG | WEIGHT: 243 LBS | OXYGEN SATURATION: 99 % | HEART RATE: 98 BPM | BODY MASS INDEX: 38.14 KG/M2 | HEIGHT: 67 IN | SYSTOLIC BLOOD PRESSURE: 107 MMHG | RESPIRATION RATE: 15 BRPM

## 2023-07-11 DIAGNOSIS — I10 HYPERTENSION, UNSPECIFIED TYPE: ICD-10-CM

## 2023-07-11 DIAGNOSIS — Z76.0 MEDICATION REFILL: ICD-10-CM

## 2023-07-11 DIAGNOSIS — R53.83 OTHER FATIGUE: Primary | ICD-10-CM

## 2023-07-11 DIAGNOSIS — E55.9 VITAMIN D DEFICIENCY: ICD-10-CM

## 2023-07-11 PROCEDURE — 1036F TOBACCO NON-USER: CPT | Performed by: INTERNAL MEDICINE

## 2023-07-11 PROCEDURE — 3074F SYST BP LT 130 MM HG: CPT | Performed by: INTERNAL MEDICINE

## 2023-07-11 PROCEDURE — 99213 OFFICE O/P EST LOW 20 MIN: CPT | Performed by: INTERNAL MEDICINE

## 2023-07-11 PROCEDURE — 3078F DIAST BP <80 MM HG: CPT | Performed by: INTERNAL MEDICINE

## 2023-07-11 PROCEDURE — 3017F COLORECTAL CA SCREEN DOC REV: CPT | Performed by: INTERNAL MEDICINE

## 2023-07-11 PROCEDURE — G8417 CALC BMI ABV UP PARAM F/U: HCPCS | Performed by: INTERNAL MEDICINE

## 2023-07-11 PROCEDURE — G8428 CUR MEDS NOT DOCUMENT: HCPCS | Performed by: INTERNAL MEDICINE

## 2023-07-11 RX ORDER — ACETAMINOPHEN 160 MG
1 TABLET,DISINTEGRATING ORAL DAILY
Qty: 90 CAPSULE | Refills: 1 | Status: SHIPPED | OUTPATIENT
Start: 2023-07-11

## 2023-07-11 RX ORDER — NYSTATIN 10B UNIT
1 POWDER (EA) MISCELLANEOUS 2 TIMES DAILY
Qty: 3 EACH | Refills: 5 | Status: SHIPPED | OUTPATIENT
Start: 2023-07-11

## 2023-07-11 RX ORDER — LINACLOTIDE 290 UG/1
CAPSULE, GELATIN COATED ORAL
Qty: 90 CAPSULE | Refills: 3 | Status: SHIPPED | OUTPATIENT
Start: 2023-07-11

## 2023-07-11 RX ORDER — NYSTATIN 10B UNIT
POWDER (EA) MISCELLANEOUS 2 TIMES DAILY
COMMUNITY
End: 2023-07-11 | Stop reason: SDUPTHER

## 2023-07-11 ASSESSMENT — PATIENT HEALTH QUESTIONNAIRE - PHQ9
8. MOVING OR SPEAKING SO SLOWLY THAT OTHER PEOPLE COULD HAVE NOTICED. OR THE OPPOSITE, BEING SO FIGETY OR RESTLESS THAT YOU HAVE BEEN MOVING AROUND A LOT MORE THAN USUAL: 0
1. LITTLE INTEREST OR PLEASURE IN DOING THINGS: 0
2. FEELING DOWN, DEPRESSED OR HOPELESS: 1
10. IF YOU CHECKED OFF ANY PROBLEMS, HOW DIFFICULT HAVE THESE PROBLEMS MADE IT FOR YOU TO DO YOUR WORK, TAKE CARE OF THINGS AT HOME, OR GET ALONG WITH OTHER PEOPLE: 1
SUM OF ALL RESPONSES TO PHQ QUESTIONS 1-9: 2
6. FEELING BAD ABOUT YOURSELF - OR THAT YOU ARE A FAILURE OR HAVE LET YOURSELF OR YOUR FAMILY DOWN: 0
9. THOUGHTS THAT YOU WOULD BE BETTER OFF DEAD, OR OF HURTING YOURSELF: 0
SUM OF ALL RESPONSES TO PHQ QUESTIONS 1-9: 2
5. POOR APPETITE OR OVEREATING: 0
7. TROUBLE CONCENTRATING ON THINGS, SUCH AS READING THE NEWSPAPER OR WATCHING TELEVISION: 1
SUM OF ALL RESPONSES TO PHQ9 QUESTIONS 1 & 2: 1
SUM OF ALL RESPONSES TO PHQ QUESTIONS 1-9: 2
3. TROUBLE FALLING OR STAYING ASLEEP: 0
SUM OF ALL RESPONSES TO PHQ QUESTIONS 1-9: 2
4. FEELING TIRED OR HAVING LITTLE ENERGY: 0

## 2023-07-11 NOTE — PROGRESS NOTES
1. \"Have you been to the ER, urgent care clinic since your last visit? Hospitalized since your last visit? \" No    2. \"Have you seen or consulted any other health care providers outside of the [address] [place] [address] since your last visit? \" Yes Dr. Mady Victoria      3. For patients aged 43-73: Has the patient had a colonoscopy / FIT/ Cologuard? Yes - no Care Gap present      If the patient is female:    4. For patients aged 43-66: Has the patient had a mammogram within the past 2 years? Yes - no Care Gap present      5. For patients aged 21-65: Has the patient had a pap smear?  No

## 2023-07-14 ENCOUNTER — CLINICAL DOCUMENTATION (OUTPATIENT)
Age: 63
End: 2023-07-14

## 2023-07-14 NOTE — PROGRESS NOTES
Patient inquired about bill for $217 from date of service 4/18/23. Medicare primary and [employer] secondary. Per billing notes, this claim has been re-submitted to Medicare, advised patient I will follow up next week to check progress with claim and advise her back with any updates.

## 2023-07-19 RX ORDER — ENALAPRIL MALEATE 20 MG/1
TABLET ORAL
Qty: 90 TABLET | Refills: 3 | Status: SHIPPED | OUTPATIENT
Start: 2023-07-19

## 2023-08-01 ENCOUNTER — CLINICAL DOCUMENTATION (OUTPATIENT)
Age: 63
End: 2023-08-01

## 2023-08-01 NOTE — PROGRESS NOTES
Patient called today after receiving a second bill for her appointment 4/18/23, submitted formal request to [employer] team to review for correction, patient is aware. Requested they contact patient to advise once resolved.

## 2023-08-31 ENCOUNTER — OFFICE VISIT (OUTPATIENT)
Facility: CLINIC | Age: 63
End: 2023-08-31
Payer: MEDICARE

## 2023-08-31 VITALS
WEIGHT: 242 LBS | BODY MASS INDEX: 37.98 KG/M2 | HEART RATE: 101 BPM | TEMPERATURE: 96.8 F | RESPIRATION RATE: 16 BRPM | DIASTOLIC BLOOD PRESSURE: 69 MMHG | HEIGHT: 67 IN | OXYGEN SATURATION: 99 % | SYSTOLIC BLOOD PRESSURE: 100 MMHG

## 2023-08-31 DIAGNOSIS — E11.21 TYPE 2 DIABETES MELLITUS WITH DIABETIC NEPHROPATHY, WITHOUT LONG-TERM CURRENT USE OF INSULIN (HCC): ICD-10-CM

## 2023-08-31 DIAGNOSIS — R53.83 OTHER FATIGUE: Primary | ICD-10-CM

## 2023-08-31 DIAGNOSIS — L29.9 PRURITUS: ICD-10-CM

## 2023-08-31 PROCEDURE — 3078F DIAST BP <80 MM HG: CPT | Performed by: INTERNAL MEDICINE

## 2023-08-31 PROCEDURE — G8427 DOCREV CUR MEDS BY ELIG CLIN: HCPCS | Performed by: INTERNAL MEDICINE

## 2023-08-31 PROCEDURE — G8417 CALC BMI ABV UP PARAM F/U: HCPCS | Performed by: INTERNAL MEDICINE

## 2023-08-31 PROCEDURE — 3017F COLORECTAL CA SCREEN DOC REV: CPT | Performed by: INTERNAL MEDICINE

## 2023-08-31 PROCEDURE — 3074F SYST BP LT 130 MM HG: CPT | Performed by: INTERNAL MEDICINE

## 2023-08-31 PROCEDURE — 3044F HG A1C LEVEL LT 7.0%: CPT | Performed by: INTERNAL MEDICINE

## 2023-08-31 PROCEDURE — 99213 OFFICE O/P EST LOW 20 MIN: CPT | Performed by: INTERNAL MEDICINE

## 2023-08-31 PROCEDURE — 2022F DILAT RTA XM EVC RTNOPTHY: CPT | Performed by: INTERNAL MEDICINE

## 2023-08-31 PROCEDURE — 1036F TOBACCO NON-USER: CPT | Performed by: INTERNAL MEDICINE

## 2023-08-31 NOTE — PROGRESS NOTES
[facility]  Outside Information     CTA HEART WITH ANGIOGRAPHY    Anatomical Region Laterality Modality   Chest -- Computed Tomography     Impression    1. This impression segment pertains to noncardiac portion of interpretation. Reference narrative body of report for cardiopulmonary and aortic vascular components of assessment. 2. Noncardiovascular findings as above. Saud Holden MD [facility]     CARDIAC CTA:     COMPARISON: Nuclear stress test 6/2/2017. CARDIOVASCULAR FINDINGS:     Technical Quality: Acceptable (moderate artifacts, diagnostic quality)     Coronary Arteries:   Normal origins of the coronary arteries. Coronary artery dominance is right. Left main (LM): No plaque, no stenosis   Ramus intermedius: Not present     Left anterior descending (LAD)   Proximal LAD: Calcified plaque causes mild stenosis. Mid LAD: Calcified plaque causes mild stenosis. Distal LAD: Not well assessed due to excessive image noise. Diagonal branches: Not well assessed due to excessive image noise. Left circumflex (LCX)   Proximal LCX: No plaque, no stenosis. Distal LCX: Not well assessed due to excessive image noise. Obtuse marginal branches: Not well assessed due to excessive image noise. Right coronary artery (RCA)   Proximal RCA: Calcified plaques causes minimal stenosis. Mid RCA: No plaque, no stenosis. Distal RCA: No plaque, no stenosis. PDA and PLV: No plaque, no stenosis. Calcium score: The total calcium score is 119 indicating moderate amount of total calcified plaque burden. LEFT MAIN: 0.     LAD: 118. LCX: 0.     RCA: 1     Cardiac Structure: Left atrium is borderline enlarged. No pericardial effusion. No abnormality of the thoracic aorta or vena cava. Normal trileaflet aortic valve. The pulmonary venous anatomy is within normal limits.      Extra-coronary calcification: No calcification within the aortic valve, mitral

## 2023-08-31 NOTE — PROGRESS NOTES
1. \"Have you been to the ER, urgent care clinic since your last visit? Hospitalized since your last visit? \" No    2. \"Have you seen or consulted any other health care providers outside of the [address] [place] [address] since your last visit? \" Yes televisit w/ Psych/Therapist      3. For patients aged 43-73: Has the patient had a colonoscopy / FIT/ Cologuard? Yes - no Care Gap present      If the patient is female:    4. For patients aged 43-66: Has the patient had a mammogram within the past 2 years? Yes - no Care Gap present      5. For patients aged 21-65: Has the patient had a pap smear?  No

## 2023-10-24 ENCOUNTER — OFFICE VISIT (OUTPATIENT)
Facility: CLINIC | Age: 63
End: 2023-10-24
Payer: MEDICARE

## 2023-10-24 VITALS
DIASTOLIC BLOOD PRESSURE: 80 MMHG | HEIGHT: 67 IN | WEIGHT: 234.13 LBS | TEMPERATURE: 96.6 F | BODY MASS INDEX: 36.75 KG/M2 | SYSTOLIC BLOOD PRESSURE: 115 MMHG | HEART RATE: 108 BPM | RESPIRATION RATE: 15 BRPM | OXYGEN SATURATION: 99 %

## 2023-10-24 DIAGNOSIS — R53.83 OTHER FATIGUE: Primary | ICD-10-CM

## 2023-10-24 DIAGNOSIS — E55.9 VITAMIN D DEFICIENCY: ICD-10-CM

## 2023-10-24 DIAGNOSIS — Z76.0 MEDICATION REFILL: ICD-10-CM

## 2023-10-24 DIAGNOSIS — Z23 NEEDS FLU SHOT: ICD-10-CM

## 2023-10-24 PROCEDURE — 1036F TOBACCO NON-USER: CPT | Performed by: INTERNAL MEDICINE

## 2023-10-24 PROCEDURE — G8482 FLU IMMUNIZE ORDER/ADMIN: HCPCS | Performed by: INTERNAL MEDICINE

## 2023-10-24 PROCEDURE — 3079F DIAST BP 80-89 MM HG: CPT | Performed by: INTERNAL MEDICINE

## 2023-10-24 PROCEDURE — 3017F COLORECTAL CA SCREEN DOC REV: CPT | Performed by: INTERNAL MEDICINE

## 2023-10-24 PROCEDURE — 90674 CCIIV4 VAC NO PRSV 0.5 ML IM: CPT | Performed by: INTERNAL MEDICINE

## 2023-10-24 PROCEDURE — G8417 CALC BMI ABV UP PARAM F/U: HCPCS | Performed by: INTERNAL MEDICINE

## 2023-10-24 PROCEDURE — G8428 CUR MEDS NOT DOCUMENT: HCPCS | Performed by: INTERNAL MEDICINE

## 2023-10-24 PROCEDURE — 3074F SYST BP LT 130 MM HG: CPT | Performed by: INTERNAL MEDICINE

## 2023-10-24 PROCEDURE — G0008 ADMIN INFLUENZA VIRUS VAC: HCPCS | Performed by: INTERNAL MEDICINE

## 2023-10-24 PROCEDURE — 99213 OFFICE O/P EST LOW 20 MIN: CPT | Performed by: INTERNAL MEDICINE

## 2023-10-24 RX ORDER — SUMATRIPTAN SUCCINATE 6 MG/.5ML
INJECTION, SOLUTION SUBCUTANEOUS
Qty: 15 ML | Refills: 5 | Status: SHIPPED | OUTPATIENT
Start: 2023-10-24

## 2023-10-24 RX ORDER — ACETAMINOPHEN 160 MG
1 TABLET,DISINTEGRATING ORAL DAILY
Qty: 90 CAPSULE | Refills: 1 | Status: SHIPPED | OUTPATIENT
Start: 2023-10-24

## 2023-10-24 ASSESSMENT — PATIENT HEALTH QUESTIONNAIRE - PHQ9
SUM OF ALL RESPONSES TO PHQ QUESTIONS 1-9: 5
4. FEELING TIRED OR HAVING LITTLE ENERGY: 0
SUM OF ALL RESPONSES TO PHQ9 QUESTIONS 1 & 2: 1
1. LITTLE INTEREST OR PLEASURE IN DOING THINGS: 0
SUM OF ALL RESPONSES TO PHQ QUESTIONS 1-9: 5
5. POOR APPETITE OR OVEREATING: 3
3. TROUBLE FALLING OR STAYING ASLEEP: 0
2. FEELING DOWN, DEPRESSED OR HOPELESS: 1
7. TROUBLE CONCENTRATING ON THINGS, SUCH AS READING THE NEWSPAPER OR WATCHING TELEVISION: 1
SUM OF ALL RESPONSES TO PHQ QUESTIONS 1-9: 5
10. IF YOU CHECKED OFF ANY PROBLEMS, HOW DIFFICULT HAVE THESE PROBLEMS MADE IT FOR YOU TO DO YOUR WORK, TAKE CARE OF THINGS AT HOME, OR GET ALONG WITH OTHER PEOPLE: 1
6. FEELING BAD ABOUT YOURSELF - OR THAT YOU ARE A FAILURE OR HAVE LET YOURSELF OR YOUR FAMILY DOWN: 0
9. THOUGHTS THAT YOU WOULD BE BETTER OFF DEAD, OR OF HURTING YOURSELF: 0
8. MOVING OR SPEAKING SO SLOWLY THAT OTHER PEOPLE COULD HAVE NOTICED. OR THE OPPOSITE, BEING SO FIGETY OR RESTLESS THAT YOU HAVE BEEN MOVING AROUND A LOT MORE THAN USUAL: 0
SUM OF ALL RESPONSES TO PHQ QUESTIONS 1-9: 5

## 2023-10-24 ASSESSMENT — ENCOUNTER SYMPTOMS: SHORTNESS OF BREATH: 0

## 2023-10-24 NOTE — PROGRESS NOTES
1. \"Have you been to the ER, urgent care clinic since your last visit? Hospitalized since your last visit? \" No    2. \"Have you seen or consulted any other health care providers outside of the [address] [place] [address] since your last visit? \" Yes Cardiology and Dr. Tanner Weston      3. For patients aged 43-73: Has the patient had a colonoscopy / FIT/ Cologuard? Yes - no Care Gap present      If the patient is female:    4. For patients aged 43-66: Has the patient had a mammogram within the past 2 years? Yes - no Care Gap present      5. For patients aged 21-65: Has the patient had a pap smear? Yes - Care Gap present.  Rooming MA/LPN to request most recent results - Dr. Herber Cobos
After obtaining consent, and per orders of Dr. Artie Cedillo, injection of flu given in Left arm by Nettie Prasad MA. Patient instructed to remain in clinic for 20 minutes afterwards, and to report any adverse reaction to me immediately.
not sleeping during the day  She volunteered that she is afraid to go out especially in the dark. Driving makes her nervous as well. She continues with her CPAP    She is slowly losing weight. Sometimes she does not have an appetite    Her DM is controlled    Her heart CTA did not show anything significant  Her echocardiogram did not find a reason. I suspect some of this is medication. Some depression, some weight related, some lack of exercise. I advised that exercise might help, but I really have nothing else to offer her.     F/u 3 months for malaise, fatigue, review diabetes and HTN

## 2023-11-14 ENCOUNTER — OFFICE VISIT (OUTPATIENT)
Facility: CLINIC | Age: 63
End: 2023-11-14
Payer: MEDICARE

## 2023-11-14 ENCOUNTER — HOSPITAL ENCOUNTER (OUTPATIENT)
Facility: HOSPITAL | Age: 63
Setting detail: SPECIMEN
Discharge: HOME | End: 2023-11-17
Payer: MEDICARE

## 2023-11-14 VITALS
TEMPERATURE: 96.7 F | DIASTOLIC BLOOD PRESSURE: 63 MMHG | HEART RATE: 104 BPM | HEIGHT: 67 IN | SYSTOLIC BLOOD PRESSURE: 101 MMHG | RESPIRATION RATE: 16 BRPM | WEIGHT: 237 LBS | BODY MASS INDEX: 37.2 KG/M2 | OXYGEN SATURATION: 100 %

## 2023-11-14 DIAGNOSIS — R82.90 URINE ABNORMALITY: ICD-10-CM

## 2023-11-14 DIAGNOSIS — R82.90 URINE ABNORMALITY: Primary | ICD-10-CM

## 2023-11-14 DIAGNOSIS — N30.00 ACUTE CYSTITIS WITHOUT HEMATURIA: ICD-10-CM

## 2023-11-14 LAB
AMORPH CRY URNS QL MICRO: ABNORMAL
APPEARANCE UR: ABNORMAL
BACTERIA URNS QL MICRO: ABNORMAL /HPF
BILIRUB UR QL: NEGATIVE
BILIRUBIN, URINE, POC: NEGATIVE
BLOOD URINE, POC: NEGATIVE
COLOR UR: YELLOW
EPITH CASTS URNS QL MICRO: ABNORMAL /LPF (ref 0–5)
GLUCOSE UR STRIP.AUTO-MCNC: NEGATIVE MG/DL
GLUCOSE URINE, POC: NEGATIVE
HGB UR QL STRIP: NEGATIVE
KETONES UR QL STRIP.AUTO: NEGATIVE MG/DL
KETONES, URINE, POC: ABNORMAL
LEUKOCYTE ESTERASE UR QL STRIP.AUTO: NEGATIVE
LEUKOCYTE ESTERASE, URINE, POC: NEGATIVE
NITRITE UR QL STRIP.AUTO: POSITIVE
NITRITE, URINE, POC: POSITIVE
PH UR STRIP: >8.5 [PH] (ref 5–8)
PH, URINE, POC: 7.5 (ref 4.6–8)
PROT UR STRIP-MCNC: ABNORMAL MG/DL
PROTEIN,URINE, POC: ABNORMAL
RBC #/AREA URNS HPF: ABNORMAL /HPF (ref 0–5)
SP GR UR REFRACTOMETRY: 1.03 (ref 1–1.03)
SPECIFIC GRAVITY, URINE, POC: 1.01 (ref 1–1.03)
URINALYSIS CLARITY, POC: ABNORMAL
URINALYSIS COLOR, POC: ABNORMAL
UROBILINOGEN UR QL STRIP.AUTO: 1 EU/DL (ref 0.2–1)
UROBILINOGEN, POC: NORMAL
WBC URNS QL MICRO: ABNORMAL /HPF (ref 0–4)

## 2023-11-14 PROCEDURE — 1036F TOBACCO NON-USER: CPT | Performed by: INTERNAL MEDICINE

## 2023-11-14 PROCEDURE — G8417 CALC BMI ABV UP PARAM F/U: HCPCS | Performed by: INTERNAL MEDICINE

## 2023-11-14 PROCEDURE — 3074F SYST BP LT 130 MM HG: CPT | Performed by: INTERNAL MEDICINE

## 2023-11-14 PROCEDURE — 99213 OFFICE O/P EST LOW 20 MIN: CPT | Performed by: INTERNAL MEDICINE

## 2023-11-14 PROCEDURE — 87186 SC STD MICRODIL/AGAR DIL: CPT

## 2023-11-14 PROCEDURE — 87077 CULTURE AEROBIC IDENTIFY: CPT

## 2023-11-14 PROCEDURE — G8482 FLU IMMUNIZE ORDER/ADMIN: HCPCS | Performed by: INTERNAL MEDICINE

## 2023-11-14 PROCEDURE — 81001 URINALYSIS AUTO W/SCOPE: CPT

## 2023-11-14 PROCEDURE — 3078F DIAST BP <80 MM HG: CPT | Performed by: INTERNAL MEDICINE

## 2023-11-14 PROCEDURE — 81001 URINALYSIS AUTO W/SCOPE: CPT | Performed by: INTERNAL MEDICINE

## 2023-11-14 PROCEDURE — 3017F COLORECTAL CA SCREEN DOC REV: CPT | Performed by: INTERNAL MEDICINE

## 2023-11-14 PROCEDURE — 87086 URINE CULTURE/COLONY COUNT: CPT

## 2023-11-14 PROCEDURE — G8427 DOCREV CUR MEDS BY ELIG CLIN: HCPCS | Performed by: INTERNAL MEDICINE

## 2023-11-14 RX ORDER — AMOXICILLIN 500 MG/1
500 CAPSULE ORAL 3 TIMES DAILY
Qty: 21 CAPSULE | Refills: 0 | Status: SHIPPED | OUTPATIENT
Start: 2023-11-14 | End: 2023-11-21

## 2023-11-14 ASSESSMENT — PATIENT HEALTH QUESTIONNAIRE - PHQ9: DEPRESSION UNABLE TO ASSESS: URGENT/EMERGENT SITUATION

## 2023-11-14 NOTE — PATIENT INSTRUCTIONS
Drink plenty of water to flush the kidneys and bladder.     May take vitamin C 500 mg daily while on treatment

## 2023-11-17 LAB
BACTERIA SPEC CULT: ABNORMAL
CC UR VC: ABNORMAL
SERVICE CMNT-IMP: ABNORMAL

## 2024-01-24 ENCOUNTER — OFFICE VISIT (OUTPATIENT)
Facility: CLINIC | Age: 64
End: 2024-01-24
Payer: MEDICARE

## 2024-01-24 VITALS
OXYGEN SATURATION: 99 % | HEIGHT: 67 IN | HEART RATE: 91 BPM | SYSTOLIC BLOOD PRESSURE: 100 MMHG | DIASTOLIC BLOOD PRESSURE: 69 MMHG | TEMPERATURE: 97.1 F | BODY MASS INDEX: 36.57 KG/M2 | WEIGHT: 233 LBS | RESPIRATION RATE: 14 BRPM

## 2024-01-24 DIAGNOSIS — N18.31 STAGE 3A CHRONIC KIDNEY DISEASE (HCC): ICD-10-CM

## 2024-01-24 DIAGNOSIS — E11.21 TYPE 2 DIABETES MELLITUS WITH DIABETIC NEPHROPATHY, WITHOUT LONG-TERM CURRENT USE OF INSULIN (HCC): ICD-10-CM

## 2024-01-24 DIAGNOSIS — I10 HYPERTENSION, UNSPECIFIED TYPE: ICD-10-CM

## 2024-01-24 DIAGNOSIS — M43.6 NECK STIFFNESS: ICD-10-CM

## 2024-01-24 DIAGNOSIS — R53.83 OTHER FATIGUE: Primary | ICD-10-CM

## 2024-01-24 DIAGNOSIS — D22.9 SKIN MOLE: ICD-10-CM

## 2024-01-24 DIAGNOSIS — I73.9 PERIPHERAL VASCULAR DISEASE, UNSPECIFIED (HCC): ICD-10-CM

## 2024-01-24 DIAGNOSIS — E66.01 SEVERE OBESITY (BMI 35.0-39.9) WITH COMORBIDITY (HCC): ICD-10-CM

## 2024-01-24 PROCEDURE — 3078F DIAST BP <80 MM HG: CPT | Performed by: INTERNAL MEDICINE

## 2024-01-24 PROCEDURE — 2022F DILAT RTA XM EVC RTNOPTHY: CPT | Performed by: INTERNAL MEDICINE

## 2024-01-24 PROCEDURE — 99214 OFFICE O/P EST MOD 30 MIN: CPT | Performed by: INTERNAL MEDICINE

## 2024-01-24 PROCEDURE — 1036F TOBACCO NON-USER: CPT | Performed by: INTERNAL MEDICINE

## 2024-01-24 PROCEDURE — G8428 CUR MEDS NOT DOCUMENT: HCPCS | Performed by: INTERNAL MEDICINE

## 2024-01-24 PROCEDURE — 3074F SYST BP LT 130 MM HG: CPT | Performed by: INTERNAL MEDICINE

## 2024-01-24 PROCEDURE — 3046F HEMOGLOBIN A1C LEVEL >9.0%: CPT | Performed by: INTERNAL MEDICINE

## 2024-01-24 PROCEDURE — 3017F COLORECTAL CA SCREEN DOC REV: CPT | Performed by: INTERNAL MEDICINE

## 2024-01-24 PROCEDURE — G8482 FLU IMMUNIZE ORDER/ADMIN: HCPCS | Performed by: INTERNAL MEDICINE

## 2024-01-24 PROCEDURE — G8417 CALC BMI ABV UP PARAM F/U: HCPCS | Performed by: INTERNAL MEDICINE

## 2024-01-24 RX ORDER — LIDOCAINE 50 MG/G
1 PATCH TOPICAL DAILY
Qty: 30 PATCH | Refills: 5 | Status: SHIPPED | OUTPATIENT
Start: 2024-01-24

## 2024-01-24 RX ORDER — TIZANIDINE 4 MG/1
4 TABLET ORAL EVERY 6 HOURS PRN
Qty: 30 TABLET | Refills: 5 | Status: SHIPPED | OUTPATIENT
Start: 2024-01-24

## 2024-01-24 NOTE — PROGRESS NOTES
HISTORY OF PRESENT ILLNESS      Ann Frey is a 63 y.o. female.    /69 (Site: Right Upper Arm, Position: Sitting, Cuff Size: Large Adult)   Pulse 91   Temp 97.1 °F (36.2 °C) (Temporal)   Resp 14   Ht 1.702 m (5' 7\")   Wt 105.7 kg (233 lb)   SpO2 99%   BMI 36.49 kg/m²       States her HBA1c is down to 5.9    Wt is down 9# since last visit.    She is now off Trulicity for a while. She will restart as soon as the pharmacy gets it--but at a lower dose.        Past Medical History:  03/10/2011: Abnormal bone density screening  1996: Anemia  No date: Arthritis  No date: Baker's cyst      Comment:  left  No date: Cardiomyopathy (HCC)  No date: Chicken pox  No date: Constipation  No date: Constipation due to pain medication  08/02/2022: COVID-19  1999: CTS (carpal tunnel syndrome)      Comment:  bilat.  2000: Depression  No date: Diabetes (HCC)  No date: Diabetes mellitus ([facility])      Comment:  type 2 diabetes mellitus without complication, without                long-term current use of insulin. type 2 diabetes                mellitus with nephropathy   No date: Disorder of urinary tract  No date: Dysfunctional voiding of urine  No date: Dyslipidemia  No date: Essential hypertension  No date: Fatigue      Comment:  multifactorial  2000: Fibromyalgia  No date: GERD (gastroesophageal reflux disease)  04/03/2017: H/O bone density study      Comment:  osteopenia, SEE MEDIA  No date: Hematuria  No date: History of meniscal tear  No date: HTN (hypertension)  No date: Incontinence  2003: Metacarpal bone fracture      Comment:  left 5th  No date: Migraine      Comment:  aura without headache  No date: Nephropathy      Comment:  due to diabetes?  No date: OAB (overactive bladder)      Comment:  documented on UDS 2009  No date: Obesity  03/2006: Osteopenia  1996: Plantar fasciitis  07/19/2016: Pre-syncope  No date: Primary osteoarthritis of left knee  No date: Renal insufficiency      Comment:  PER DR. CODY

## 2024-01-24 NOTE — PROGRESS NOTES
\"Have you been to the ER, urgent care clinic since your last visit?  Hospitalized since your last visit?\"    YES - When: approximately 3  weeks ago.  Where and Why: [facility] for fluid behind left ear.    “Have you seen or consulted any other health care providers outside of [facility] since your last visit?”    YES - When: approximately 6 days ago.  Where and Why: Endocrinology for Trulicity adjustment.        “Have you had a pap smear?”    NO

## 2024-04-16 LAB
CREATININE, EXTERNAL: 134
HBA1C MFR BLD HPLC: 6.2 %
MICROALBUMIN URINE, EXTERNAL: NORMAL
MICROALBUMIN/CREATININE RATIO, EXTERNAL: <12

## 2024-05-20 ENCOUNTER — TELEPHONE (OUTPATIENT)
Facility: CLINIC | Age: 64
End: 2024-05-20

## 2024-05-20 DIAGNOSIS — G89.29 CHRONIC LOW BACK PAIN, UNSPECIFIED BACK PAIN LATERALITY, UNSPECIFIED WHETHER SCIATICA PRESENT: ICD-10-CM

## 2024-05-20 DIAGNOSIS — R53.82 CHRONIC FATIGUE: ICD-10-CM

## 2024-05-20 DIAGNOSIS — M54.50 CHRONIC LOW BACK PAIN, UNSPECIFIED BACK PAIN LATERALITY, UNSPECIFIED WHETHER SCIATICA PRESENT: ICD-10-CM

## 2024-05-20 DIAGNOSIS — M79.7 FIBROMYALGIA: ICD-10-CM

## 2024-05-20 DIAGNOSIS — E11.21 TYPE 2 DIABETES MELLITUS WITH DIABETIC NEPHROPATHY, WITHOUT LONG-TERM CURRENT USE OF INSULIN (HCC): ICD-10-CM

## 2024-05-20 DIAGNOSIS — I73.9 PERIPHERAL VASCULAR DISEASE (HCC): Primary | ICD-10-CM

## 2024-05-20 DIAGNOSIS — M19.90 ARTHRITIS: ICD-10-CM

## 2024-05-20 DIAGNOSIS — E66.01 SEVERE OBESITY (BMI 35.0-39.9) WITH COMORBIDITY (HCC): ICD-10-CM

## 2024-05-20 NOTE — TELEPHONE ENCOUNTER
----- Message from Keiko Florentino sent at 5/20/2024  2:37 PM EDT -----  Subject: Message to Provider    QUESTIONS  Information for Provider? patient would like provider Charlie sy to   give the a call  ---------------------------------------------------------------------------  --------------  CALL BACK INFO  [phone]; Do not leave any message, patient will call back for answer  ---------------------------------------------------------------------------  --------------  SCRIPT ANSWERS  Relationship to Patient? Self

## 2024-05-21 NOTE — TELEPHONE ENCOUNTER
Spoke with pt in regards to her concerns. Two patient identifier's verified.  Pt states she has found the [employer] that is wiling to provide her a shower chair. Pt states she is requesting a shower chair because she is becomes fatigued and more weak when standing in the shower for periods of time. PCP notified.

## 2024-05-21 NOTE — TELEPHONE ENCOUNTER
Order signed      Orders Placed This Encounter    DME Order for Bath/Shower Seat at OP     Bath/Shower Bench with back    Current patient weight:  246lbs   Current patient height:  5'7\"  Diagnosis: chronic back pain, fibromyalgia, peripheral vascular disease, arthritis  Duration: Purchase

## 2024-06-12 ENCOUNTER — OFFICE VISIT (OUTPATIENT)
Facility: CLINIC | Age: 64
End: 2024-06-12
Payer: MEDICARE

## 2024-06-12 VITALS
DIASTOLIC BLOOD PRESSURE: 71 MMHG | HEIGHT: 64 IN | BODY MASS INDEX: 40.8 KG/M2 | WEIGHT: 239 LBS | TEMPERATURE: 97.5 F | RESPIRATION RATE: 15 BRPM | OXYGEN SATURATION: 98 % | SYSTOLIC BLOOD PRESSURE: 102 MMHG | HEART RATE: 96 BPM

## 2024-06-12 DIAGNOSIS — K59.09 CHRONIC CONSTIPATION: ICD-10-CM

## 2024-06-12 DIAGNOSIS — R53.83 OTHER FATIGUE: ICD-10-CM

## 2024-06-12 DIAGNOSIS — E11.21 TYPE 2 DIABETES MELLITUS WITH DIABETIC NEPHROPATHY, WITHOUT LONG-TERM CURRENT USE OF INSULIN (HCC): ICD-10-CM

## 2024-06-12 DIAGNOSIS — I10 HYPERTENSION, UNSPECIFIED TYPE: ICD-10-CM

## 2024-06-12 DIAGNOSIS — Z00.00 MEDICARE ANNUAL WELLNESS VISIT, SUBSEQUENT: Primary | ICD-10-CM

## 2024-06-12 PROCEDURE — 2022F DILAT RTA XM EVC RTNOPTHY: CPT | Performed by: INTERNAL MEDICINE

## 2024-06-12 PROCEDURE — 3046F HEMOGLOBIN A1C LEVEL >9.0%: CPT | Performed by: INTERNAL MEDICINE

## 2024-06-12 PROCEDURE — G8417 CALC BMI ABV UP PARAM F/U: HCPCS | Performed by: INTERNAL MEDICINE

## 2024-06-12 PROCEDURE — 1036F TOBACCO NON-USER: CPT | Performed by: INTERNAL MEDICINE

## 2024-06-12 PROCEDURE — G0439 PPPS, SUBSEQ VISIT: HCPCS | Performed by: INTERNAL MEDICINE

## 2024-06-12 PROCEDURE — G8427 DOCREV CUR MEDS BY ELIG CLIN: HCPCS | Performed by: INTERNAL MEDICINE

## 2024-06-12 PROCEDURE — 3078F DIAST BP <80 MM HG: CPT | Performed by: INTERNAL MEDICINE

## 2024-06-12 PROCEDURE — 3074F SYST BP LT 130 MM HG: CPT | Performed by: INTERNAL MEDICINE

## 2024-06-12 PROCEDURE — 99214 OFFICE O/P EST MOD 30 MIN: CPT | Performed by: INTERNAL MEDICINE

## 2024-06-12 PROCEDURE — 3017F COLORECTAL CA SCREEN DOC REV: CPT | Performed by: INTERNAL MEDICINE

## 2024-06-12 RX ORDER — SEMAGLUTIDE 0.68 MG/ML
INJECTION, SOLUTION SUBCUTANEOUS
COMMUNITY
Start: 2024-05-31

## 2024-06-12 SDOH — ECONOMIC STABILITY: INCOME INSECURITY: HOW HARD IS IT FOR YOU TO PAY FOR THE VERY BASICS LIKE FOOD, HOUSING, MEDICAL CARE, AND HEATING?: NOT HARD AT ALL

## 2024-06-12 SDOH — ECONOMIC STABILITY: FOOD INSECURITY: WITHIN THE PAST 12 MONTHS, THE FOOD YOU BOUGHT JUST DIDN'T LAST AND YOU DIDN'T HAVE MONEY TO GET MORE.: NEVER TRUE

## 2024-06-12 SDOH — ECONOMIC STABILITY: FOOD INSECURITY: WITHIN THE PAST 12 MONTHS, YOU WORRIED THAT YOUR FOOD WOULD RUN OUT BEFORE YOU GOT MONEY TO BUY MORE.: NEVER TRUE

## 2024-06-12 ASSESSMENT — PATIENT HEALTH QUESTIONNAIRE - PHQ9
4. FEELING TIRED OR HAVING LITTLE ENERGY: NEARLY EVERY DAY
SUM OF ALL RESPONSES TO PHQ QUESTIONS 1-9: 9
3. TROUBLE FALLING OR STAYING ASLEEP: SEVERAL DAYS
10. IF YOU CHECKED OFF ANY PROBLEMS, HOW DIFFICULT HAVE THESE PROBLEMS MADE IT FOR YOU TO DO YOUR WORK, TAKE CARE OF THINGS AT HOME, OR GET ALONG WITH OTHER PEOPLE: SOMEWHAT DIFFICULT
5. POOR APPETITE OR OVEREATING: SEVERAL DAYS
SUM OF ALL RESPONSES TO PHQ QUESTIONS 1-9: 9
SUM OF ALL RESPONSES TO PHQ QUESTIONS 1-9: 9
SUM OF ALL RESPONSES TO PHQ9 QUESTIONS 1 & 2: 0
SUM OF ALL RESPONSES TO PHQ QUESTIONS 1-9: 9
9. THOUGHTS THAT YOU WOULD BE BETTER OFF DEAD, OR OF HURTING YOURSELF: NOT AT ALL
7. TROUBLE CONCENTRATING ON THINGS, SUCH AS READING THE NEWSPAPER OR WATCHING TELEVISION: NEARLY EVERY DAY
1. LITTLE INTEREST OR PLEASURE IN DOING THINGS: NOT AT ALL
2. FEELING DOWN, DEPRESSED OR HOPELESS: NOT AT ALL
8. MOVING OR SPEAKING SO SLOWLY THAT OTHER PEOPLE COULD HAVE NOTICED. OR THE OPPOSITE, BEING SO FIGETY OR RESTLESS THAT YOU HAVE BEEN MOVING AROUND A LOT MORE THAN USUAL: SEVERAL DAYS
6. FEELING BAD ABOUT YOURSELF - OR THAT YOU ARE A FAILURE OR HAVE LET YOURSELF OR YOUR FAMILY DOWN: NOT AT ALL

## 2024-06-12 ASSESSMENT — ENCOUNTER SYMPTOMS: CONSTIPATION: 1

## 2024-06-12 NOTE — PATIENT INSTRUCTIONS
prescribed. Call your doctor if you think you are having a problem with your medicine.     If your doctor recommends aspirin, take the amount directed each day. Make sure you take aspirin and not another kind of pain reliever, such as acetaminophen (Tylenol).   When should you call for help?   Call 911 if you have symptoms of a heart attack. These may include:    Chest pain or pressure, or a strange feeling in the chest.     Sweating.     Shortness of breath.     Pain, pressure, or a strange feeling in the back, neck, jaw, or upper belly or in one or both shoulders or arms.     Lightheadedness or sudden weakness.     A fast or irregular heartbeat.   After you call 911, the  may tell you to chew 1 adult-strength or 2 to 4 low-dose aspirin. Wait for an ambulance. Do not try to drive yourself.  Watch closely for changes in your health, and be sure to contact your doctor if you have any problems.  Where can you learn more?  Go to https://www.[employer].net/patientEd and enter F075 to learn more about \"A Healthy Heart: Care Instructions.\"  Current as of: June 24, 2023  Content Version: 14.1  © 6440-0151 [employer].   Care instructions adapted under license by [employer]. If you have questions about a medical condition or this instruction, always ask your healthcare professional. [employer] disclaims any warranty or liability for your use of this information.      Personalized Preventive Plan for Ann Frey - 6/12/2024  Medicare offers a range of preventive health benefits. Some of the tests and screenings are paid in full while other may be subject to a deductible, co-insurance, and/or copay.    Some of these benefits include a comprehensive review of your medical history including lifestyle, illnesses that may run in your family, and various assessments and screenings as appropriate.    After reviewing your medical record and screening and assessments performed today your

## 2024-06-12 NOTE — PROGRESS NOTES
Medicare Annual Wellness Visit    Ann Frey is here for Medicare AWV, Diabetes, Cholesterol Problem, and Weight Management    Assessment & Plan   Medicare annual wellness visit, subsequent    Recommendations for Preventive Services Due: see orders and patient instructions/AVS.  Recommended screening schedule for the next 5-10 years is provided to the patient in written form: see Patient Instructions/AVS.     No follow-ups on file.     Subjective       Patient's complete Health Risk Assessment and screening values have been reviewed and are found in Flowsheets. The following problems were reviewed today and where indicated follow up appointments were made and/or referrals ordered.    Positive Risk Factor Screenings with Interventions:        Depression:  PHQ-2 Score: 0  PHQ-9 Total Score: 9    Interpretation:  5-9 mild   10-14 moderate   15-19 moderately severe   20-27 severe   Interventions:  Monitored by specialist. No acute findings meriting change in the plan          General HRA Questions:  Select all that apply: (!) New or Increased Fatigue    Fatigue Interventions:  See AVS for additional education material      Activity, Diet, and Weight:  On average, how many days per week do you engage in moderate to strenuous exercise (like a brisk walk)?: 0 days  On average, how many minutes do you engage in exercise at this level?: 0 min    Do you eat balanced/healthy meals regularly?: Yes    Body mass index is 41.02 kg/m². (!) Abnormal      Inactivity Interventions:  See AVS for additional education material  Obesity Interventions:  Patient declines any further evaluation or treatment                 Advanced Directives:  Do you have a Living Will?: (!) No    Intervention:  discussed                     Objective   Vitals:    06/12/24 1121   BP: 102/71   Site: Right Upper Arm   Position: Sitting   Cuff Size: Large Adult   Pulse: 96   Resp: 15   Temp: 97.5 °F (36.4 °C)   TempSrc: Temporal   SpO2: 98%   Weight:

## 2024-06-12 NOTE — PROGRESS NOTES
She also advised us that she will be having knee replacement later in the summer by Dr. Caleb Donaldson.  Discussed with her the probable need to have a preoperative assessment here prior to her surgery.  At present she is not sure when the surgery will be scheduled        HISTORY OF PRESENT ILLNESS      Ann Frey is a 63 y.o. female.    /71 (Site: Right Upper Arm, Position: Sitting, Cuff Size: Large Adult)   Pulse 96   Temp 97.5 °F (36.4 °C) (Temporal)   Resp 15   Ht 1.626 m (5' 4\")   Wt 108.4 kg (239 lb)   SpO2 98%   BMI 41.02 kg/m²             Current Outpatient Medications:   ·  OZEMPIC, 0.25 OR 0.5 MG/DOSE, 2 MG/3ML SOPN, INJECT 0.5MG SUBCUTANEOUSLY ONCE WEEKLY, Disp: , Rfl:   ·  linaclotide (LINZESS) 145 MCG capsule, Take 1 capsule by mouth every morning (before breakfast), Disp: 30 capsule, Rfl: 2  ·  famotidine (PEPCID) 40 MG tablet, TAKE 1 TABLET EVERY MORNING AND 1 TABLET AT BEDTIME AND IF DOING WELL JUST TAKE 1 TABLET AT BEDTIME, Disp: , Rfl:   ·  diclofenac sodium (VOLTAREN) 1 % GEL, apply 1 gram four times a day if needed, Disp: , Rfl:   ·  lidocaine (LIDODERM) 5 %, Place 1 patch onto the skin daily, Disp: 30 patch, Rfl: 5  ·  tiZANidine (ZANAFLEX) 4 MG tablet, Take 1 tablet by mouth every 6 hours as needed (muscle spasm), Disp: 30 tablet, Rfl: 5  ·  fenofibrate micronized (LOFIBRA) 134 MG capsule, Take 1 capsule by mouth every morning (before breakfast), Disp: , Rfl:   ·  Cholecalciferol (VITAMIN D3) 50 MCG (2000 UT) CAPS, Take 1 capsule by mouth daily, Disp: 90 capsule, Rfl: 1  ·  SUMAtriptan Succinate Refill 6 MG/0.5ML SOCT, inject 1 dose (6 MG) subcutaneously for migraines, Disp: 15 mL, Rfl: 5  ·  enalapril (VASOTEC) 20 MG tablet, take 1 tablet by mouth once daily (Patient taking differently: 0.5 tablets), Disp: 90 tablet, Rfl: 3  ·  Venlafaxine HCl (EFFEXOR PO), Take 1 tablet by mouth daily, Disp: , Rfl:   ·  linaclotide (LINZESS) 290 MCG CAPS capsule, take 1 capsule

## 2024-07-03 DIAGNOSIS — E55.9 VITAMIN D DEFICIENCY: ICD-10-CM

## 2024-07-03 DIAGNOSIS — Z76.0 MEDICATION REFILL: ICD-10-CM

## 2024-07-03 RX ORDER — ACETAMINOPHEN 160 MG
1 TABLET,DISINTEGRATING ORAL DAILY
Qty: 90 CAPSULE | Refills: 3 | Status: SHIPPED | OUTPATIENT
Start: 2024-07-03

## 2024-07-03 NOTE — TELEPHONE ENCOUNTER
Last Appointment:  6/12/2024  Future Appointments   Date Time Provider Department Center   7/31/2024 11:00 AM [facility] RECON NURSE [facility] Sched   8/9/2024 11:20 AM Laura Giordano APRN - NP [facility] Sched   8/21/2024 11:00 AM [facility] RECON NURSE [facility] Sched   12/18/2024 11:00 AM Shahla Guerra MD [facility]

## 2024-07-10 RX ORDER — ENALAPRIL MALEATE 20 MG/1
20 TABLET ORAL DAILY
Qty: 90 TABLET | Refills: 3 | Status: SHIPPED | OUTPATIENT
Start: 2024-07-10

## 2024-07-10 NOTE — TELEPHONE ENCOUNTER
Last Appointment:  6/12/2024  Future Appointments   Date Time Provider Department Center   7/31/2024 11:00 AM [facility] RECON NURSE [facility] Sched   8/9/2024 11:20 AM Laura Giordano APRN - NP [facility] Sched   8/21/2024 11:00 AM [facility] RECON NURSE [facility] Sched   12/18/2024 11:00 AM Shahla Guerra MD [facility]

## 2024-07-31 ENCOUNTER — CARE COORDINATION (OUTPATIENT)
Facility: CLINIC | Age: 64
End: 2024-07-31

## 2024-07-31 NOTE — CARE COORDINATION
Care Transitions Note    Initial Call - Call within 2 business days of discharge: Yes    Attempted to reach patient for transitions of care follow up. Unable to reach patient.    Outreach Attempts:   HIPAA compliant voicemail left for patient.     Patient: Ann Frey    Patient : 1960   MRN: [id number]        Reason for Admission  Per Chart Review  - Syncope and Collapse       Discharge Date: 2024  RURS: No data recorded    Last Discharge Facility       Date Complaint Diagnosis Description Type Department Provider    24  Family history of colonic polyps ... Admission (Discharged) Luis Blankenship MD            Was this an external facility discharge? Yes. Discharge Date: 2024 . Facility Name: [facility]     Follow Up Appointment:   Patient has hospital follow up appointment scheduled within 14 days of discharge.   CTN submitted a request on 24, upon notification of patient discharge, requesting a PCP Transitions of Care appointment within 7 days of hospital discharge if possible.  Request submitted via secure e-mail.    Future Appointments         Provider Specialty Dept Phone    2024 11:00 AM [facility] RECON NURSE Urology [phone]    2024 12:00 PM Shahla Guerra MD Internal Medicine [phone]    2024 11:20 AM Laura Giordano APRN - NP Urology [phone]    2024 11:00 AM [facility] RECON NURSE Urology [phone]    2024 11:00 AM Shahla Guerra MD Internal Medicine [phone]            CTN will attempt follow up with patient regarding the Initial Care Transitions Outreach.       Ann Allen RN

## 2024-08-01 ENCOUNTER — CARE COORDINATION (OUTPATIENT)
Facility: CLINIC | Age: 64
End: 2024-08-01

## 2024-08-01 NOTE — CARE COORDINATION
Care Transitions Note    Initial Call - Call within 2 business days of discharge: Yes    Attempted to reach patient for transitions of care follow up. Unable to reach patient.    Outreach Attempts:   HIPAA compliant voicemail left for patient.     Patient: Ann Frey    Patient : 1960   MRN: [id number]    Reason for Admission: Syncope and Collapse   Discharge Date: 24  RURS: No data recorded  Last Discharge Facility       Date Complaint Diagnosis Description Type Department Provider    24  Family history of colonic polyps ... Admission (Discharged) Luis Blankenship MD            Was this an external facility discharge? Yes. Discharge Date: 2024. Facility Name: [facility]     Follow Up Appointment:   Patient has hospital follow up appointment scheduled within 14 days of discharge.    Request for PCP Transitions of Care appointment has been previously submitted.   Future Appointments         Provider Specialty Dept Phone    2024 12:00 PM Shahla Guerra MD Internal Medicine [phone]    2024 11:20 AM Laura Giordano APRN - NP Urology [phone]    2024 11:00 AM [facility] NURSE Urology [phone]    2024 11:00 AM Shahla Guerra MD Internal Medicine [phone]          My Chart message forwarded to patient.      No further follow-up call indicated     Care Transitions program closed.     Ann Allen RN

## 2024-08-08 ENCOUNTER — OFFICE VISIT (OUTPATIENT)
Facility: CLINIC | Age: 64
End: 2024-08-08

## 2024-08-08 VITALS
WEIGHT: 254.2 LBS | SYSTOLIC BLOOD PRESSURE: 106 MMHG | DIASTOLIC BLOOD PRESSURE: 72 MMHG | BODY MASS INDEX: 39.9 KG/M2 | OXYGEN SATURATION: 100 % | HEIGHT: 67 IN | TEMPERATURE: 97 F | HEART RATE: 92 BPM | RESPIRATION RATE: 16 BRPM

## 2024-08-08 DIAGNOSIS — I95.9 HYPOTENSION, UNSPECIFIED HYPOTENSION TYPE: ICD-10-CM

## 2024-08-08 DIAGNOSIS — S20.229A CONTUSION OF BACK, UNSPECIFIED LATERALITY, INITIAL ENCOUNTER: ICD-10-CM

## 2024-08-08 DIAGNOSIS — R55 SYNCOPE, UNSPECIFIED SYNCOPE TYPE: ICD-10-CM

## 2024-08-08 DIAGNOSIS — Z09 HOSPITAL DISCHARGE FOLLOW-UP: Primary | ICD-10-CM

## 2024-08-08 RX ORDER — ENALAPRIL MALEATE 2.5 MG/1
2.5 TABLET ORAL DAILY
COMMUNITY
Start: 2024-07-28

## 2024-08-08 SDOH — ECONOMIC STABILITY: FOOD INSECURITY: WITHIN THE PAST 12 MONTHS, YOU WORRIED THAT YOUR FOOD WOULD RUN OUT BEFORE YOU GOT MONEY TO BUY MORE.: SOMETIMES TRUE

## 2024-08-08 SDOH — ECONOMIC STABILITY: FOOD INSECURITY: WITHIN THE PAST 12 MONTHS, THE FOOD YOU BOUGHT JUST DIDN'T LAST AND YOU DIDN'T HAVE MONEY TO GET MORE.: NEVER TRUE

## 2024-08-08 SDOH — ECONOMIC STABILITY: INCOME INSECURITY: HOW HARD IS IT FOR YOU TO PAY FOR THE VERY BASICS LIKE FOOD, HOUSING, MEDICAL CARE, AND HEATING?: SOMEWHAT HARD

## 2024-08-08 ASSESSMENT — PATIENT HEALTH QUESTIONNAIRE - PHQ9
SUM OF ALL RESPONSES TO PHQ QUESTIONS 1-9: 7
5. POOR APPETITE OR OVEREATING: SEVERAL DAYS
2. FEELING DOWN, DEPRESSED OR HOPELESS: SEVERAL DAYS
SUM OF ALL RESPONSES TO PHQ9 QUESTIONS 1 & 2: 2
SUM OF ALL RESPONSES TO PHQ QUESTIONS 1-9: 7
8. MOVING OR SPEAKING SO SLOWLY THAT OTHER PEOPLE COULD HAVE NOTICED. OR THE OPPOSITE, BEING SO FIGETY OR RESTLESS THAT YOU HAVE BEEN MOVING AROUND A LOT MORE THAN USUAL: NOT AT ALL
3. TROUBLE FALLING OR STAYING ASLEEP: SEVERAL DAYS
10. IF YOU CHECKED OFF ANY PROBLEMS, HOW DIFFICULT HAVE THESE PROBLEMS MADE IT FOR YOU TO DO YOUR WORK, TAKE CARE OF THINGS AT HOME, OR GET ALONG WITH OTHER PEOPLE: VERY DIFFICULT
SUM OF ALL RESPONSES TO PHQ QUESTIONS 1-9: 7
4. FEELING TIRED OR HAVING LITTLE ENERGY: SEVERAL DAYS
1. LITTLE INTEREST OR PLEASURE IN DOING THINGS: SEVERAL DAYS
9. THOUGHTS THAT YOU WOULD BE BETTER OFF DEAD, OR OF HURTING YOURSELF: NOT AT ALL
SUM OF ALL RESPONSES TO PHQ QUESTIONS 1-9: 7
7. TROUBLE CONCENTRATING ON THINGS, SUCH AS READING THE NEWSPAPER OR WATCHING TELEVISION: MORE THAN HALF THE DAYS
6. FEELING BAD ABOUT YOURSELF - OR THAT YOU ARE A FAILURE OR HAVE LET YOURSELF OR YOUR FAMILY DOWN: NOT AT ALL

## 2024-08-08 NOTE — PROGRESS NOTES
[facility]  Outside Information     Echo Cardiogram Complete    Anatomical Region Laterality Modality   -- -- Color Flow Doppler     Narrative    CONCLUSIONS    * For the indication of Syncope/Presyncope/Lightheadedness, findings are no cause identified.    * Left ventricular wall thickness is normal.    * Left ventricular chamber size is normal.    * Left ventricular systolic function is normal with an ejection fraction of 60 % by visual estimation.    * Left ventricular diastolic function: normal left atrial pressure with grade I diastolic dysfunction.    * Right ventricular chamber dimension is normal.    * Right ventricular systolic function is normal.    * No hemodynamically significant valvular disease.    * Unable to estimate pulmonary arterial pressure due to inadequate tricuspid regurgitant Doppler waveforms.    Comparison    * No prior study is available for comparison.      Patient Info  Name:     Ann Frey  Age:     64 years  :     1960  Gender:     Female  MRN:     [id number]  Ht:     67 in  Wt:     245 lb  BSA:     2.34 m2  BP:     124  /     72 mmHg  Exam Date:     2024 11:36 AM  Patient Status:     OBS    Exam Type:     ECHO CARDIOGRAM COMPLETE    Indications      Syncope/Presyncope/Lightheadedness -      Left Ventricle    Left ventricular systolic function is normal with an ejection fraction of 60 % by visual estimation. Left ventricular wall thickness is normal. Left ventricular segmental wall motion is normal. Left ventricular diastolic function: normal left atrial pressure with grade I diastolic dysfunction. Left ventricular chamber size is normal.    Right Ventricle    Tricuspid annular plane systolic excursion (TAPSE) is normal, 2.4 cm. Right ventricular systolic function is normal. Right ventricular chamber dimension is normal.      Left Atrium    Left atrial chamber is normal with a left atrial volume index biplane method of disk (BP MOD) of 14 
       [facility]  Outside Information     PVL CAROTID ARTERY BILATERAL    Anatomical Region Laterality Modality   Head -- Duplex Doppler     Narrative    Bilateral: A bilateral internal carotid artery Doppler study was performed.  Right: B-mode imaging of the right internal carotid artery does not identify any intraluminal atherosclerotic plaque. The right internal carotid artery Doppler spectral analysis demonstrates peak systolic velocities less than 125 cm/sec and an internal to common carotid artery ratio less than 2.0. The internal carotid artery end diastolic velocity is less than 40 cm/sec. Antegrade flow with a normal hemodynamic profile was present in the right vertebral artery. The spectral Doppler waveform in the subclavian artery is multiphasic.  Left: B-mode imaging of the left internal carotid artery identifies mild atherosclerotic plaque. The left internal carotid artery Doppler spectral analysis demonstrates peak systolic velocities less than 125 cm/sec and an internal to common carotid artery ratio less than 2.0. The end diastolic velocity is less than 40 cm/sec. This data is consistent with the presence of a mild (< 50%) stenosis of the left internal carotid artery. Antegrade flow with a normal hemodynamic profile was present in the left vertebral artery. The spectral Doppler waveform in the subclavian artery is multiphasic.  Conclusions: Normal right extracranial carotid and vertebral duplex examination.   Mild plaque (<50%) is present within the left internal carotid artery that is not associated with a hemodynamically significant stenosis.    Antegrade flow with a normal hemodynamic profile was present in the left vertebral artery.  Procedure Note    Rony Moore MD - 07/29/2024  Formatting of this note might be different from the original.  Bilateral: A bilateral internal carotid artery Doppler study was performed.  Right: B-mode imaging of the right internal carotid artery does 
\"Have you been to the ER, urgent care clinic since your last visit?  Hospitalized since your last visit?\"    YES - When: approximately 1 days ago.  Where and Why: [facility].    “Have you seen or consulted any other health care providers outside of [facility] since your last visit?”    NO     “Have you had a pap smear?”    YES - Where: Dr. Agrawal Nurse/CMA to request most recent records if not in the chart    Date of last Cervical Cancer screen (HPV or PAP): 9/25/2014             Click Here for Release of Records Request  
person, place, and time.   Psychiatric:         Mood and Affect: Mood normal.         Behavior: Behavior normal.         ASSESSMENT and PLAN      ICD-10-CM    1. Hospital discharge follow-up  Z09       2. Hypotension, unspecified hypotension type  I95.9       3. Syncope, unspecified syncope type  R55       4. Contusion of back, unspecified laterality, initial encounter  S20.229A            Reviewed [facility] note    Reviewed hospital note including echo and carotid PVL    BP low today off the spironolactone and coreg. Continue same doses for now    ? Syncopal spell was analogous to cough syncope since she was clearing her throat when it happened.    Recommend back stretches, topical NSAIDS. Offered and declined PT at this time    F/u as appointed in December.  Will route notes to Dr. Castellon.

## 2024-08-15 ENCOUNTER — TELEPHONE (OUTPATIENT)
Facility: CLINIC | Age: 64
End: 2024-08-15

## 2024-08-15 DIAGNOSIS — K59.09 CHRONIC CONSTIPATION: ICD-10-CM

## 2024-08-15 NOTE — TELEPHONE ENCOUNTER
Please return call to patient regarding her Rx of   linaclotide (LINZESS) 145 MCG capsule [[id number]]    Order Details  Dose: 145 mcg Route: Oral Frequency: DAILY BEFORE BREAKFAST   Dispense Quantity: 30 capsule Refills: 2    Note to Pharmacy: Dose adjustment         Sig: Take 1 capsule by mouth every morning (before breakfast)   She is wanting to get an 90 day supply instead of a 30 day supply she states that the co pay is the same so she would rather have the 90 day supply please return call to patient when available

## 2024-08-15 NOTE — TELEPHONE ENCOUNTER
No problem.  A refill will be sent for 90 days.    Orders Placed This Encounter    linaclotide (LINZESS) 145 MCG capsule     Sig: Take 1 capsule by mouth every morning (before breakfast)     Dispense:  90 capsule     Refill:  3     Dose adjustment     Encounter Diagnosis   Name Primary?    Chronic constipation

## 2024-08-16 NOTE — TELEPHONE ENCOUNTER
Spoke with pt in regards to medication request for Linzess. Two patient identifier's verified.  Relayed the PCP's note. Pt acknowledges understanding and pt inquires if the PCP would be willing to refill enalapril when she becomes out of her current prescription. Pt states she has two refills left at this time. PCP notified.

## 2024-08-21 NOTE — TELEPHONE ENCOUNTER
Spoke with pt in regards to enalapril. Two patient identifier's verified.  Relayed the PCP's note, Yes, I will refill that (enalapril). Pt acknowledges understanding and voices no concerns at this time.

## 2024-08-27 ENCOUNTER — TELEPHONE (OUTPATIENT)
Facility: CLINIC | Age: 64
End: 2024-08-27

## 2024-08-27 NOTE — TELEPHONE ENCOUNTER
----- Message from Bill DEE sent at 8/27/2024  1:26 PM EDT -----  Regarding: ECC Message to Provider  ECC Message to Provider    Relationship to Patient: Self     Additional Information : Patient went to the hospital on  07- and she wants to ask if her pcp, Dr. Shahla Guerra, has sent over the recent hospital visit  information/record to the patient's heart doctor, Mario Castellon.    --------------------------------------------------------------------------------------------------------------------------    Call Back Information: OK to leave message on voicemail  Preferred Call Back Number: Phone :  [phone]

## 2024-08-29 NOTE — TELEPHONE ENCOUNTER
Spoke with pt in regards to hospital records. Two patient identifier's verified.  Relayed the PCP's note, We do not do that.  We would only send our record.  Dr. Castellon can view or request the hospital record.  Pt acknowledges understanding and voices no concerns at this time.

## 2024-09-30 ENCOUNTER — TELEPHONE (OUTPATIENT)
Facility: CLINIC | Age: 64
End: 2024-09-30

## 2024-09-30 NOTE — TELEPHONE ENCOUNTER
Pt called stating that the pharmacy is waiting for paperwork for enalapril (VASOTEC) 2.5 MG tablet. Pt stated that she is out of medication. Please advise

## 2024-10-01 RX ORDER — ENALAPRIL MALEATE 2.5 MG/1
2.5 TABLET ORAL DAILY
Qty: 90 TABLET | Refills: 0 | Status: SHIPPED | OUTPATIENT
Start: 2024-10-01

## 2024-10-01 NOTE — TELEPHONE ENCOUNTER
Patient states this was prescribed to her when she was in the hospital, states this was discussed at her last OV after she was discharged.  Does not see Cardiology until the end of Oct and she is out of medication.  Also asking if the Rx can be a 60 or 90 day fill.

## 2024-12-18 ENCOUNTER — HOSPITAL ENCOUNTER (OUTPATIENT)
Facility: HOSPITAL | Age: 64
Setting detail: SPECIMEN
Discharge: HOME | End: 2024-12-21
Payer: MEDICARE

## 2024-12-18 ENCOUNTER — OFFICE VISIT (OUTPATIENT)
Facility: CLINIC | Age: 64
End: 2024-12-18

## 2024-12-18 VITALS
BODY MASS INDEX: 39.42 KG/M2 | SYSTOLIC BLOOD PRESSURE: 115 MMHG | TEMPERATURE: 96.9 F | OXYGEN SATURATION: 98 % | WEIGHT: 251.13 LBS | DIASTOLIC BLOOD PRESSURE: 75 MMHG | HEART RATE: 102 BPM | HEIGHT: 67 IN | RESPIRATION RATE: 17 BRPM

## 2024-12-18 DIAGNOSIS — E11.21 TYPE 2 DIABETES MELLITUS WITH DIABETIC NEPHROPATHY, WITHOUT LONG-TERM CURRENT USE OF INSULIN (HCC): ICD-10-CM

## 2024-12-18 DIAGNOSIS — I10 HYPERTENSION, UNSPECIFIED TYPE: ICD-10-CM

## 2024-12-18 DIAGNOSIS — I10 HYPERTENSION, UNSPECIFIED TYPE: Primary | ICD-10-CM

## 2024-12-18 DIAGNOSIS — Z76.0 MEDICATION REFILL: ICD-10-CM

## 2024-12-18 LAB
ALBUMIN SERPL-MCNC: 4.2 G/DL (ref 3.4–5)
ALBUMIN/GLOB SERPL: 1.2 (ref 0.8–1.7)
ALP SERPL-CCNC: 68 U/L (ref 45–117)
ALT SERPL-CCNC: 36 U/L (ref 13–56)
ANION GAP SERPL CALC-SCNC: 7 MMOL/L (ref 3–18)
AST SERPL-CCNC: 27 U/L (ref 10–38)
BILIRUB SERPL-MCNC: 0.4 MG/DL (ref 0.2–1)
BUN SERPL-MCNC: 16 MG/DL (ref 7–18)
BUN/CREAT SERPL: 12 (ref 12–20)
CALCIUM SERPL-MCNC: 10 MG/DL (ref 8.5–10.1)
CHLORIDE SERPL-SCNC: 110 MMOL/L (ref 100–111)
CHOLEST SERPL-MCNC: 108 MG/DL
CO2 SERPL-SCNC: 24 MMOL/L (ref 21–32)
CREAT SERPL-MCNC: 1.39 MG/DL (ref 0.6–1.3)
CREAT UR-MCNC: 228 MG/DL (ref 30–125)
EST. AVERAGE GLUCOSE BLD GHB EST-MCNC: 128 MG/DL
GLOBULIN SER CALC-MCNC: 3.6 G/DL (ref 2–4)
GLUCOSE SERPL-MCNC: 115 MG/DL (ref 74–99)
HBA1C MFR BLD: 6.1 % (ref 4.2–5.6)
HDLC SERPL-MCNC: 60 MG/DL (ref 40–60)
HDLC SERPL: 1.8 (ref 0–5)
LDLC SERPL CALC-MCNC: 30 MG/DL (ref 0–100)
LIPID PANEL: NORMAL
MICROALBUMIN UR-MCNC: 2.42 MG/DL (ref 0–3)
MICROALBUMIN/CREAT UR-RTO: 11 MG/G (ref 0–30)
POTASSIUM SERPL-SCNC: 4.3 MMOL/L (ref 3.5–5.5)
PROT SERPL-MCNC: 7.8 G/DL (ref 6.4–8.2)
SODIUM SERPL-SCNC: 141 MMOL/L (ref 136–145)
TRIGL SERPL-MCNC: 90 MG/DL
VLDLC SERPL CALC-MCNC: 18 MG/DL

## 2024-12-18 PROCEDURE — 83036 HEMOGLOBIN GLYCOSYLATED A1C: CPT

## 2024-12-18 PROCEDURE — 82043 UR ALBUMIN QUANTITATIVE: CPT

## 2024-12-18 PROCEDURE — 36415 COLL VENOUS BLD VENIPUNCTURE: CPT

## 2024-12-18 PROCEDURE — 80061 LIPID PANEL: CPT

## 2024-12-18 PROCEDURE — 80053 COMPREHEN METABOLIC PANEL: CPT

## 2024-12-18 PROCEDURE — 82570 ASSAY OF URINE CREATININE: CPT

## 2024-12-18 RX ORDER — SUMATRIPTAN SUCCINATE 6 MG/.5ML
INJECTION, SOLUTION SUBCUTANEOUS
Qty: 15 ML | Refills: 5 | Status: SHIPPED | OUTPATIENT
Start: 2024-12-18

## 2024-12-18 RX ORDER — ENALAPRIL MALEATE 2.5 MG/1
2.5 TABLET ORAL DAILY
Qty: 90 TABLET | Refills: 3 | Status: SHIPPED | OUTPATIENT
Start: 2024-12-18

## 2024-12-18 SDOH — ECONOMIC STABILITY: FOOD INSECURITY: WITHIN THE PAST 12 MONTHS, THE FOOD YOU BOUGHT JUST DIDN'T LAST AND YOU DIDN'T HAVE MONEY TO GET MORE.: NEVER TRUE

## 2024-12-18 SDOH — ECONOMIC STABILITY: FOOD INSECURITY: WITHIN THE PAST 12 MONTHS, YOU WORRIED THAT YOUR FOOD WOULD RUN OUT BEFORE YOU GOT MONEY TO BUY MORE.: NEVER TRUE

## 2024-12-18 SDOH — ECONOMIC STABILITY: INCOME INSECURITY: HOW HARD IS IT FOR YOU TO PAY FOR THE VERY BASICS LIKE FOOD, HOUSING, MEDICAL CARE, AND HEATING?: NOT VERY HARD

## 2024-12-18 ASSESSMENT — PATIENT HEALTH QUESTIONNAIRE - PHQ9
4. FEELING TIRED OR HAVING LITTLE ENERGY: SEVERAL DAYS
SUM OF ALL RESPONSES TO PHQ QUESTIONS 1-9: 7
1. LITTLE INTEREST OR PLEASURE IN DOING THINGS: SEVERAL DAYS
9. THOUGHTS THAT YOU WOULD BE BETTER OFF DEAD, OR OF HURTING YOURSELF: NOT AT ALL
2. FEELING DOWN, DEPRESSED OR HOPELESS: SEVERAL DAYS
8. MOVING OR SPEAKING SO SLOWLY THAT OTHER PEOPLE COULD HAVE NOTICED. OR THE OPPOSITE, BEING SO FIGETY OR RESTLESS THAT YOU HAVE BEEN MOVING AROUND A LOT MORE THAN USUAL: NOT AT ALL
5. POOR APPETITE OR OVEREATING: SEVERAL DAYS
7. TROUBLE CONCENTRATING ON THINGS, SUCH AS READING THE NEWSPAPER OR WATCHING TELEVISION: MORE THAN HALF THE DAYS
10. IF YOU CHECKED OFF ANY PROBLEMS, HOW DIFFICULT HAVE THESE PROBLEMS MADE IT FOR YOU TO DO YOUR WORK, TAKE CARE OF THINGS AT HOME, OR GET ALONG WITH OTHER PEOPLE: VERY DIFFICULT
SUM OF ALL RESPONSES TO PHQ QUESTIONS 1-9: 7
SUM OF ALL RESPONSES TO PHQ QUESTIONS 1-9: 7
SUM OF ALL RESPONSES TO PHQ9 QUESTIONS 1 & 2: 2
3. TROUBLE FALLING OR STAYING ASLEEP: SEVERAL DAYS
6. FEELING BAD ABOUT YOURSELF - OR THAT YOU ARE A FAILURE OR HAVE LET YOURSELF OR YOUR FAMILY DOWN: NOT AT ALL
SUM OF ALL RESPONSES TO PHQ QUESTIONS 1-9: 7

## 2024-12-18 ASSESSMENT — ENCOUNTER SYMPTOMS
SHORTNESS OF BREATH: 0
CHEST TIGHTNESS: 0

## 2024-12-18 NOTE — PROGRESS NOTES
\"Have you been to the ER, urgent care clinic since your last visit?  Hospitalized since your last visit?\"    YES - When: approximately 4 months ago.  Where and Why: [facility].    “Have you seen or consulted any other health care providers outside our system since your last visit?”    YES - When: approximately 1  weeks ago.  Where and Why: [facility]     “Have you had a pap smear?”    YES - Where: [facility] Nurse/CMA to request most recent records if not in the chart    Date of last Cervical Cancer screen (HPV or PAP): 9/25/2014       “Have you had a diabetic eye exam?”    YES - Where: Dr. Meek Nurse/CMA to request most recent records if not in the chart     Date of last diabetic eye exam: 1/22/2021          
normal   Pulse DP: 2+ (normal)   Filament test: normal sensation        Psychiatric:         Mood and Affect: Mood normal.         Behavior: Behavior normal.         ASSESSMENT and PLAN      ICD-10-CM    1. Hypertension, unspecified type  I10 Comprehensive Metabolic Panel     Lipid Panel      2. Type 2 diabetes mellitus with diabetic nephropathy, without long-term current use of insulin (HCC)  E11.21 Comprehensive Metabolic Panel     Hemoglobin A1C     Lipid Panel     Albumin/Creatinine Ratio, Urine     HM DIABETES FOOT EXAM      3. Medication refill  Z76.0 SUMAtriptan Succinate Refill 6 MG/0.5ML SOCT     enalapril (VASOTEC) 2.5 MG tablet         BP controlled.    Continue same medications and doses    DM due for recheck since we have not gotten anything from [facility] in a while  Will request notes from [facility] but update lab for our system.    Reviewed urology note. She gets botox which has helped her voiding    Reviewed last hospital note from her syncope.  Reports no subsequent symptoms    Reviewed note from bariatric clinic at [facility]    Hopefully Dr. Castellon will send us his update.  Especially with the results of her cardiac monitor    Discussed Shingrix and RSV. She will discuss with her pharmacist    F/u 6 months for HTN, DM, chol

## 2024-12-18 NOTE — PATIENT INSTRUCTIONS
[facility] Housing Resources*      For emergency housing call Housing Hotlines:   [facility] Housing Crisis Hotline 481-455-1240 (ForKids)      [facility]  [address], [place] [address] [phone]  Helpful Info:  [facility] coordinates with other organizations to utilize supportive services to move homeless individuals from dependency to self-sufficiency.   No same day service. Must call to complete pre-intake screening with staff member. There is a current waitlist for program. Shower and laundry available Monday-Friday.     [facility] [phone]  [address] [address]  Helpful info: Shelters provide food, shelters, resources, prayers for men in the [facility]    [facility]  Supportive housing program for homeless women and children.   Must contact Regional Crisis Hotline at [phone] for information on program.   WEBSITE: https://[email or web address]/[email or web address]  What they offer: Transitional housing for single mothers and pregnant women 18-35.   Website: https://www.[facility].org/need-help/  To apply: click on link above to answer a few questions or call [phone]  [address] [address] [phone]   Helpful Info: Open 24/7, 365 days a year. Emergency shelter for Men, Single Women, and Women with children.  Residents receive 3 meals a day, clothing, toiletries, shower and laundry services, case management, and counseling. Call for bed availability.     [employer] [facility]  [address] [place] [address] [phone]  Website: [email or web address]://[employer].[email or web address]/  Helpful Info: Referrals are ONLY accepted through the Housing Crisis Line ([phone]). Limited space/beds available.  Their program provides short-term subsidized shelter,

## 2025-06-18 ENCOUNTER — OFFICE VISIT (OUTPATIENT)
Facility: CLINIC | Age: 65
End: 2025-06-18
Payer: MEDICARE

## 2025-06-18 VITALS
RESPIRATION RATE: 16 BRPM | DIASTOLIC BLOOD PRESSURE: 71 MMHG | OXYGEN SATURATION: 98 % | BODY MASS INDEX: 39.58 KG/M2 | HEART RATE: 90 BPM | SYSTOLIC BLOOD PRESSURE: 108 MMHG | TEMPERATURE: 97.3 F | WEIGHT: 252.2 LBS | HEIGHT: 67 IN

## 2025-06-18 DIAGNOSIS — E11.21 TYPE 2 DIABETES MELLITUS WITH DIABETIC NEPHROPATHY, WITHOUT LONG-TERM CURRENT USE OF INSULIN (HCC): ICD-10-CM

## 2025-06-18 DIAGNOSIS — Z00.00 MEDICARE ANNUAL WELLNESS VISIT, SUBSEQUENT: ICD-10-CM

## 2025-06-18 DIAGNOSIS — E66.01 SEVERE OBESITY (BMI 35.0-39.9) WITH COMORBIDITY (HCC): ICD-10-CM

## 2025-06-18 DIAGNOSIS — I10 HYPERTENSION, UNSPECIFIED TYPE: Primary | ICD-10-CM

## 2025-06-18 DIAGNOSIS — N18.31 STAGE 3A CHRONIC KIDNEY DISEASE (HCC): ICD-10-CM

## 2025-06-18 DIAGNOSIS — R53.83 OTHER FATIGUE: ICD-10-CM

## 2025-06-18 DIAGNOSIS — Z76.0 MEDICATION REFILL: ICD-10-CM

## 2025-06-18 PROCEDURE — 1036F TOBACCO NON-USER: CPT | Performed by: INTERNAL MEDICINE

## 2025-06-18 PROCEDURE — G2211 COMPLEX E/M VISIT ADD ON: HCPCS | Performed by: INTERNAL MEDICINE

## 2025-06-18 PROCEDURE — 3017F COLORECTAL CA SCREEN DOC REV: CPT | Performed by: INTERNAL MEDICINE

## 2025-06-18 PROCEDURE — 3074F SYST BP LT 130 MM HG: CPT | Performed by: INTERNAL MEDICINE

## 2025-06-18 PROCEDURE — G8427 DOCREV CUR MEDS BY ELIG CLIN: HCPCS | Performed by: INTERNAL MEDICINE

## 2025-06-18 PROCEDURE — 2022F DILAT RTA XM EVC RTNOPTHY: CPT | Performed by: INTERNAL MEDICINE

## 2025-06-18 PROCEDURE — 3046F HEMOGLOBIN A1C LEVEL >9.0%: CPT | Performed by: INTERNAL MEDICINE

## 2025-06-18 PROCEDURE — G0439 PPPS, SUBSEQ VISIT: HCPCS | Performed by: INTERNAL MEDICINE

## 2025-06-18 PROCEDURE — 99214 OFFICE O/P EST MOD 30 MIN: CPT | Performed by: INTERNAL MEDICINE

## 2025-06-18 PROCEDURE — 3078F DIAST BP <80 MM HG: CPT | Performed by: INTERNAL MEDICINE

## 2025-06-18 PROCEDURE — G8417 CALC BMI ABV UP PARAM F/U: HCPCS | Performed by: INTERNAL MEDICINE

## 2025-06-18 RX ORDER — NYSTATIN 100000 [USP'U]/G
POWDER TOPICAL
Qty: 120 G | Refills: 5 | Status: SHIPPED | OUTPATIENT
Start: 2025-06-18

## 2025-06-18 RX ORDER — TIRZEPATIDE 5 MG/.5ML
INJECTION, SOLUTION SUBCUTANEOUS
COMMUNITY
Start: 2025-06-12

## 2025-06-18 RX ORDER — SUMATRIPTAN SUCCINATE 6 MG/.5ML
INJECTION, SOLUTION SUBCUTANEOUS
Qty: 15 ML | Refills: 5 | Status: SHIPPED | OUTPATIENT
Start: 2025-06-18

## 2025-06-18 SDOH — ECONOMIC STABILITY: FOOD INSECURITY: WITHIN THE PAST 12 MONTHS, THE FOOD YOU BOUGHT JUST DIDN'T LAST AND YOU DIDN'T HAVE MONEY TO GET MORE.: NEVER TRUE

## 2025-06-18 SDOH — ECONOMIC STABILITY: FOOD INSECURITY: WITHIN THE PAST 12 MONTHS, YOU WORRIED THAT YOUR FOOD WOULD RUN OUT BEFORE YOU GOT MONEY TO BUY MORE.: NEVER TRUE

## 2025-06-18 ASSESSMENT — PATIENT HEALTH QUESTIONNAIRE - PHQ9
1. LITTLE INTEREST OR PLEASURE IN DOING THINGS: SEVERAL DAYS
6. FEELING BAD ABOUT YOURSELF - OR THAT YOU ARE A FAILURE OR HAVE LET YOURSELF OR YOUR FAMILY DOWN: NOT AT ALL
SUM OF ALL RESPONSES TO PHQ QUESTIONS 1-9: 6
7. TROUBLE CONCENTRATING ON THINGS, SUCH AS READING THE NEWSPAPER OR WATCHING TELEVISION: MORE THAN HALF THE DAYS
3. TROUBLE FALLING OR STAYING ASLEEP: NOT AT ALL
5. POOR APPETITE OR OVEREATING: SEVERAL DAYS
8. MOVING OR SPEAKING SO SLOWLY THAT OTHER PEOPLE COULD HAVE NOTICED. OR THE OPPOSITE, BEING SO FIGETY OR RESTLESS THAT YOU HAVE BEEN MOVING AROUND A LOT MORE THAN USUAL: NOT AT ALL
SUM OF ALL RESPONSES TO PHQ QUESTIONS 1-9: 6
2. FEELING DOWN, DEPRESSED OR HOPELESS: SEVERAL DAYS
9. THOUGHTS THAT YOU WOULD BE BETTER OFF DEAD, OR OF HURTING YOURSELF: NOT AT ALL
10. IF YOU CHECKED OFF ANY PROBLEMS, HOW DIFFICULT HAVE THESE PROBLEMS MADE IT FOR YOU TO DO YOUR WORK, TAKE CARE OF THINGS AT HOME, OR GET ALONG WITH OTHER PEOPLE: NOT DIFFICULT AT ALL
4. FEELING TIRED OR HAVING LITTLE ENERGY: SEVERAL DAYS

## 2025-06-18 ASSESSMENT — LIFESTYLE VARIABLES
HOW OFTEN DO YOU HAVE A DRINK CONTAINING ALCOHOL: NEVER
HOW MANY STANDARD DRINKS CONTAINING ALCOHOL DO YOU HAVE ON A TYPICAL DAY: PATIENT DOES NOT DRINK

## 2025-06-18 ASSESSMENT — ENCOUNTER SYMPTOMS: SHORTNESS OF BREATH: 0

## 2025-06-18 NOTE — PROGRESS NOTES
Have you been to the ER, urgent care clinic since your last visit?  Hospitalized since your last visit?   NO    Have you seen or consulted any other health care providers outside our system since your last visit?   NO    Have you had a mammogram?”   YES - Where: [facility] we have the results Nurse/CMA to request most recent records if not in the chart    Date of last Mammogram: 5/3/2023       “Have you had a diabetic eye exam?”    YES - Where: [facility] Letter sent Nurse/CMA to request most recent records if not in the chart     Date of last diabetic eye exam: 1/22/2021

## 2025-06-18 NOTE — PROGRESS NOTES
The following is a separate encounter visit:    HISTORY OF PRESENT ILLNESS      Ann Frey is a 64 y.o. female.    /71 (BP Site: Right Upper Arm, Patient Position: Sitting, BP Cuff Size: Large Adult)   Pulse 90   Temp 97.3 °F (36.3 °C) (Temporal)   Resp 16   Ht 1.702 m (5' 7\")   Wt 114.4 kg (252 lb 3.2 oz)   SpO2 98%   BMI 39.50 kg/m²     Hyperlipidemia  This is a chronic problem. The current episode started more than 1 year ago. Pertinent negatives include no chest pain or shortness of breath.   Hypertension  This is a chronic problem. The current episode started more than 1 year ago. Pertinent negatives include no chest pain, palpitations or shortness of breath.   Diabetes  She presents for her follow-up diabetic visit. She has type 2 diabetes mellitus. Associated symptoms include fatigue. Pertinent negatives for diabetes include no chest pain.       Review of Systems   Constitutional:  Positive for fatigue.   Respiratory:  Negative for shortness of breath.    Cardiovascular:  Negative for chest pain and palpitations.   Psychiatric/Behavioral:  Positive for dysphoric mood.            Physical Exam  Vitals and nursing note reviewed.   Constitutional:       Appearance: Normal appearance.   HENT:      Head: Normocephalic and atraumatic.   Cardiovascular:      Rate and Rhythm: Normal rate and regular rhythm.   Pulmonary:      Effort: Pulmonary effort is normal.      Breath sounds: Normal breath sounds.   Musculoskeletal:      Right lower leg: No edema.      Left lower leg: No edema.   Skin:     General: Skin is warm and dry.   Neurological:      General: No focal deficit present.      Mental Status: She is alert and oriented to person, place, and time.   Psychiatric:         Mood and Affect: Mood normal.         Behavior: Behavior normal.         ASSESSMENT and PLAN      ICD-10-CM    1. Hypertension, unspecified type  I10       2. Type 2 diabetes mellitus with diabetic nephropathy, without

## 2025-07-08 DIAGNOSIS — E55.9 VITAMIN D DEFICIENCY: ICD-10-CM

## 2025-07-08 DIAGNOSIS — Z76.0 MEDICATION REFILL: ICD-10-CM

## 2025-07-08 RX ORDER — ACETAMINOPHEN 160 MG
2000 TABLET,DISINTEGRATING ORAL DAILY
Qty: 90 CAPSULE | Refills: 3 | Status: SHIPPED | OUTPATIENT
Start: 2025-07-08

## 2025-07-08 NOTE — TELEPHONE ENCOUNTER
Ms. Frey is requesting refills of:    Requested Prescriptions     Pending Prescriptions Disp Refills    vitamin D (VITAMIN D3) 50 MCG (2000 UT) CAPS capsule 90 capsule 3     Sig: Take 1 capsule by mouth daily         to be sent to .   [id number] - [place], [place] - [address] [place] [address] -  [phone] - F [phone]   LAST OFFICE VISIT:  6/18/2025     UPCOMING APPOINTMENT(S):  Future Appointments   Date Time Provider Department Center   8/12/2025 10:20 AM [facility] MICHAEL [facility] NURSE [facility] [place] Sched   8/29/2025 11:00 AM Laura Giordano APRN - NP [facility] [place] Sched   10/23/2025 11:00 AM Shahla Guerra MD A [facility] DEP         Provided notified

## 2025-08-29 DIAGNOSIS — K59.09 CHRONIC CONSTIPATION: ICD-10-CM
